# Patient Record
Sex: FEMALE | Race: WHITE | NOT HISPANIC OR LATINO | Employment: OTHER | URBAN - METROPOLITAN AREA
[De-identification: names, ages, dates, MRNs, and addresses within clinical notes are randomized per-mention and may not be internally consistent; named-entity substitution may affect disease eponyms.]

---

## 2017-01-11 ENCOUNTER — OFFICE VISIT (OUTPATIENT)
Dept: SURGERY | Facility: CLINIC | Age: 32
End: 2017-01-11
Payer: COMMERCIAL

## 2017-01-11 ENCOUNTER — HOSPITAL ENCOUNTER (OUTPATIENT)
Dept: LAB | Facility: CLINIC | Age: 32
Discharge: HOME | End: 2017-01-11
Attending: PHYSICIAN ASSISTANT | Admitting: PHYSICIAN ASSISTANT
Payer: COMMERCIAL

## 2017-01-11 VITALS
BODY MASS INDEX: 43.4 KG/M2 | TEMPERATURE: 98.3 F | DIASTOLIC BLOOD PRESSURE: 79 MMHG | WEIGHT: 293 LBS | RESPIRATION RATE: 16 BRPM | HEIGHT: 69 IN | OXYGEN SATURATION: 99 % | SYSTOLIC BLOOD PRESSURE: 125 MMHG | HEART RATE: 79 BPM

## 2017-01-11 DIAGNOSIS — Z13.0 SCREENING FOR ENDOCRINE, NUTRITIONAL, METABOLIC AND IMMUNITY DISORDER: ICD-10-CM

## 2017-01-11 DIAGNOSIS — Z13.29 SCREENING FOR ENDOCRINE, NUTRITIONAL, METABOLIC AND IMMUNITY DISORDER: ICD-10-CM

## 2017-01-11 DIAGNOSIS — Z13.0 SCREENING FOR IRON DEFICIENCY ANEMIA: ICD-10-CM

## 2017-01-11 DIAGNOSIS — Z13.228 SCREENING FOR ENDOCRINE, NUTRITIONAL, METABOLIC AND IMMUNITY DISORDER: ICD-10-CM

## 2017-01-11 DIAGNOSIS — E66.01 OBESITY, CLASS III, BMI 40-49.9 (MORBID OBESITY) (H): Primary | ICD-10-CM

## 2017-01-11 DIAGNOSIS — Z13.21 SCREENING FOR ENDOCRINE, NUTRITIONAL, METABOLIC AND IMMUNITY DISORDER: ICD-10-CM

## 2017-01-11 DIAGNOSIS — L30.4 INTERTRIGO: ICD-10-CM

## 2017-01-11 DIAGNOSIS — M26.609 TMJ DYSFUNCTION: ICD-10-CM

## 2017-01-11 PROBLEM — E66.813 OBESITY, CLASS III, BMI 40-49.9 (MORBID OBESITY) (H): Status: ACTIVE | Noted: 2017-01-11

## 2017-01-11 LAB
ALBUMIN SERPL-MCNC: 3.7 G/DL (ref 3.4–5)
ALP SERPL-CCNC: 55 U/L (ref 40–150)
ALT SERPL W P-5'-P-CCNC: 28 U/L (ref 0–50)
ANION GAP SERPL CALCULATED.3IONS-SCNC: 10 MMOL/L (ref 3–14)
AST SERPL W P-5'-P-CCNC: 27 U/L (ref 0–45)
BILIRUB SERPL-MCNC: 0.3 MG/DL (ref 0.2–1.3)
BUN SERPL-MCNC: 14 MG/DL (ref 7–30)
CALCIUM SERPL-MCNC: 8.8 MG/DL (ref 8.5–10.1)
CHLORIDE SERPL-SCNC: 108 MMOL/L (ref 94–109)
CO2 SERPL-SCNC: 22 MMOL/L (ref 20–32)
CREAT SERPL-MCNC: 0.73 MG/DL (ref 0.52–1.04)
ERYTHROCYTE [DISTWIDTH] IN BLOOD BY AUTOMATED COUNT: 12.3 % (ref 10–15)
ERYTHROCYTE [SEDIMENTATION RATE] IN BLOOD BY WESTERGREN METHOD: 10 MM/H (ref 0–20)
GFR SERPL CREATININE-BSD FRML MDRD: NORMAL ML/MIN/1.7M2
GLUCOSE SERPL-MCNC: 83 MG/DL (ref 70–99)
HBA1C MFR BLD: 6 % (ref 4.3–6)
HCT VFR BLD AUTO: 39.8 % (ref 35–47)
HGB BLD-MCNC: 13.9 G/DL (ref 11.7–15.7)
MCH RBC QN AUTO: 29.2 PG (ref 26.5–33)
MCHC RBC AUTO-ENTMCNC: 34.9 G/DL (ref 31.5–36.5)
MCV RBC AUTO: 84 FL (ref 78–100)
PLATELET # BLD AUTO: 295 10E9/L (ref 150–450)
POTASSIUM SERPL-SCNC: 4 MMOL/L (ref 3.4–5.3)
PROT SERPL-MCNC: 8.1 G/DL (ref 6.8–8.8)
RBC # BLD AUTO: 4.76 10E12/L (ref 3.8–5.2)
SODIUM SERPL-SCNC: 140 MMOL/L (ref 133–144)
T3 SERPL-MCNC: 192 NG/DL (ref 60–181)
T4 FREE SERPL-MCNC: 1.1 NG/DL (ref 0.76–1.46)
TSH SERPL DL<=0.05 MIU/L-ACNC: 1.09 MU/L (ref 0.4–4)
WBC # BLD AUTO: 10.8 10E9/L (ref 4–11)

## 2017-01-11 PROCEDURE — 86141 C-REACTIVE PROTEIN HS: CPT | Performed by: PHYSICIAN ASSISTANT

## 2017-01-11 PROCEDURE — 84439 ASSAY OF FREE THYROXINE: CPT | Performed by: PHYSICIAN ASSISTANT

## 2017-01-11 PROCEDURE — 83036 HEMOGLOBIN GLYCOSYLATED A1C: CPT | Performed by: PHYSICIAN ASSISTANT

## 2017-01-11 PROCEDURE — 84480 ASSAY TRIIODOTHYRONINE (T3): CPT | Performed by: PHYSICIAN ASSISTANT

## 2017-01-11 PROCEDURE — 84443 ASSAY THYROID STIM HORMONE: CPT | Performed by: PHYSICIAN ASSISTANT

## 2017-01-11 PROCEDURE — 85652 RBC SED RATE AUTOMATED: CPT | Performed by: PHYSICIAN ASSISTANT

## 2017-01-11 PROCEDURE — 97802 MEDICAL NUTRITION INDIV IN: CPT | Performed by: DIETITIAN, REGISTERED

## 2017-01-11 PROCEDURE — 99205 OFFICE O/P NEW HI 60 MIN: CPT | Performed by: PHYSICIAN ASSISTANT

## 2017-01-11 PROCEDURE — 82306 VITAMIN D 25 HYDROXY: CPT | Performed by: PHYSICIAN ASSISTANT

## 2017-01-11 PROCEDURE — 85027 COMPLETE CBC AUTOMATED: CPT | Performed by: PHYSICIAN ASSISTANT

## 2017-01-11 PROCEDURE — 80053 COMPREHEN METABOLIC PANEL: CPT | Performed by: PHYSICIAN ASSISTANT

## 2017-01-11 PROCEDURE — 36415 COLL VENOUS BLD VENIPUNCTURE: CPT | Performed by: PHYSICIAN ASSISTANT

## 2017-01-11 NOTE — PROGRESS NOTES
[facility]  [address]  [place] [address]  Phone [phone] Fax [phone]  Date: 2017  RE: CANDI HERNANDEZ  MR#: [id number]  : 1985  Dear Guerrero Benavides MD OB/GYN  I had the pleasure of seeing your patient, CANDI HERNANDEZ, to evaluate her obesity and consider her for possible weight loss surgery. As you know, CANDI is 31 years old. She has been overweight since early childhood. She has been morbidly obese for the last 10 years and has been unable to achieve long-term success with weight loss attempts, such as: Lo Carbohydrate, Diet, or the 6 week body make over.   Comorbidities of obesity from her past medical history include: Asthma - (Not been on meds for years), prediabetes, Low back pain, Herniated disk - (takes occasional ibuprofen for), Skin fold rashes.  The symptoms related to her obesity include heartburn -(occasional tums), Diarrhea, Reflux, Stress Incontinence, Knee pain, Back pain, Ankle or foot pain, Hip pain, Excessively sleep during the day, Loud snoring, Awaken from sleep to catch breath, Morning headache, Shortness of Breath with activity, Leg swelling, intertrigo. The following ADLs are hindered due to her weight: Lower body dressing, Not enough energy to complete routine daily tasks, Shortness of breath with little activity, Toileting.  Non-Obesity Related Past Medical History:  Irritable bowel disorder - controlled by diet  TMJ disfunction - 2 years ago very bad case that she took ibuprofen for to relieve. Still has some bouts but intentionally avoids foods that need to be chewed very well  Anxiety - not treated for but deals with on a weekly basis. Does not interfere with daily living but can feel in her stomach. It was recommended at some point she should go on meds but never has. No history of depression  Acne  Binge Eating - Has 3 small children at home. Does not eat enough for breakfast or lunch and has a very large dinner  Past Surgical  History:  Breast Reduction, [place] teeth removal  Family History:  Father- Diabetes;High blood pressure;High cholesterol;Obese  Mother- Diabetes;High cholesterol;Obese  PGF- Cancer;High cholesterol: Cancer type: Colon Cancer  PGM- Diabetes;High cholesterol;Obese  MGF- Cancer;High cholesterol;Obese: Cancer type: Bladder Cancer  MGM- Diabetes;Obese  Sibling 1- Diabetes;Obese - History of gastric bypass 1.5 years ago  Social History:  She is . 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  Her youngest is 6 months.  Not currently breastfeeding.  Her and her  are pastors at a [employer]  Ethnicity: /white  ETOH: Never  Illicit Drug Use: None  Tobacco Use: No - never  Support system:  and mom will be available to help the patient in the early post op period. , mom, sister, grandmother, aunts is who the patient can count on for support throughout her weight loss journey.  Can you afford three meals per day? Yes  ?Can you afford the $50-60 per month for vitamins? Yes  A 14 point review of system shows:  Pulmonary: Shortness of Breath with activity,  Gastrointestinal: Reflux / heartburn, Diarrhea,  Genitourinary: Stress Incontinence,  HEENT: Headaches - wakes up with HA most days  Musculoskeletal: Knee pain, Back pain, Ankle or foot pain, Hip pain,  Psychological: Anxiety,  Pulmonary: Excessively sleep during the day, Snoring, Awaken from sleep to catch breath, Morning headaches,  Skin: Leg swelling, intertrigo    Vital Signs: Ht: 68.75 in, Wt: 308.8 lbs., BMI: 45.93, BP: 125 / 79 , Pulse: 79, RR: 16, Temp: 98.3     PHYSICAL EXAMINATION:  GENERAL: Alert and oriented x3. NAD  HEENT: Normocephalic, atraumatic, EOMI, Oral dentition adequate for chewing  CARDIOVASCULAR: Regular rate and rhythm, No murmurs, rubs or gallops  RESPIRATORY: lungs CTA bilat.   GASTROINTESTINAL: Soft, Obese, Nontender  LOWER EXTREMITIES: No edema  MUSCULOSKELETAL: Examination gait was normal  NEUROLOGIC: Alert and oriented x 3,  no gross defect  SKIN: dry, warm to touch  In summary, CANDI is morbidly obese with a body mass index of 45.93 kg/m2 and the comorbidities stated above. She attended an informational seminar and is a candidate for Laparoscopic Taz-en-Y Gastric Bypass. She will have to complete the following pre-requisites:  Received weight loss goal of 5 lb prior to surgery.  See dentist about TMJ  Establish with a primary care AND discuss anxiety -   Achieve clearance from dietitian to see surgeon.  Get Sleep study. - already ordered by OB  Have preoperative laboratory tests drawn.  Psychological Evaluation with MMPI and clearance for weight loss surgery.     Today in the office we discussed gastric bypass surgery. Preoperative, perioperative, and postoperative processes, management, and follow up were addressed. Risks and benefits were outlined including the risk of death, PE, DVT, ulcer, N/V, stricture, hernia, wound infection, and vitamin deficiencies. All questions were answered. A goal sheet and support group handout were given to the patient.  Once the patient has completed the requirements set forth in paragraph one, and there are no further recommendations, she will be allowed to see the surgeon of her choice for consultation on the Laparoscopic Taz-en-Y Gastric Bypass surgery. Patient verbalizes understanding of the process to surgery and expectations for the postoperative period including the need for lifelong lifestyle changes, vitamin supplementation, and laboratory monitoring.  Thank you for allowing us to participate in her care.  Sincerely,    Abigail Salinas MS, PARaynaC  At [facility] we are committed to providing you exceptional care. Our surgeons specialize in performing the following minimally invasive weight-loss surgeries:  Taz-en-Y gastric bypass  Laparoscopic adjustable gastric band  Sleeve gastrectomy    If you would like to review aspects of our weight loss surgery program, please visit our website at  [email or web address]. If you have any questions, please do not hesitate to contact us at [phone].

## 2017-01-11 NOTE — MR AVS SNAPSHOT
After Visit Summary   1/11/2017    Shobha Rader    MRN: [id number]           Patient Information     Date Of Birth          1985        Visit Information        Provider Department      1/11/2017 1:00 PM Abigail Salinas PA-C [facility]      Today's Diagnoses     Obesity, Class III, BMI 40-49.9 (morbid obesity) (H)    -  1     Screening for iron deficiency anemia         Screening for endocrine, nutritional, metabolic and immunity disorder         TMJ dysfunction         Intertrigo           Care Instructions    PLAN    1.  Start working on task list items  2.  Schedule with diet next month        Follow-ups after your visit        Additional Services     NUTRITION REFERRAL       Type of nutrition instruction needed: Weight Loss  Your provider has referred you to:     [facility]                  Follow-up notes from your care team     Return in 1 month (on 2/11/2017).      Your next 10 appointments already scheduled     Jan 11, 2017  2:00 PM   Evaluation with Flory Meek Diet 1, RD   [facility] ([facility])    [address] [place] [id number] [phone]              Future tests that were ordered for you today     Open Future Orders        Priority Expected Expires Ordered    CBC with platelets Routine  7/10/2017 1/11/2017    Comprehensive metabolic panel Routine  7/10/2017 1/11/2017    Hemoglobin A1c Routine  7/10/2017 1/11/2017    Vitamin D Deficiency Routine  7/10/2017 1/11/2017            Who to contact     If you have questions or need follow up information about today's clinic visit or your schedule please contact [facility] directly at [phone].  Normal or non-critical lab and imaging results will be communicated to you by MyChart, letter or phone within 4  "business days after the clinic has received the results. If you do not hear from us within 7 days, please contact the clinic through Slyce or phone. If you have a critical or abnormal lab result, we will notify you by phone as soon as possible.  Submit refill requests through Slyce or call your pharmacy and they will forward the refill request to us. Please allow 3 business days for your refill to be completed.          Additional Information About Your Visit        Slyce Information     Slyce lets you send messages to your doctor, view your test results, renew your prescriptions, schedule appointments and more. To sign up, go to [email or web address] . Click on \"Log in\" on the left side of the screen, which will take you to the Welcome page. Then click on \"Sign up Now\" on the right side of the page.     You will be asked to enter the access code listed below, as well as some personal information. Please follow the directions to create your username and password.     Your access code is: [email or web address]  Expires: 2017  1:48 PM     Your access code will  in 90 days. If you need help or a new code, please call your [facility] clinic or [phone].        Care EveryWhere ID     This is your Care EveryWhere ID. This could be used by other organizations to access your [facility] medical records  [id number]        Your Vitals Were     Pulse Temperature Respirations Height BMI (Body Mass Index) Pulse Oximetry    79 98.3  F (36.8  C) 16 5' 8.75\" (1.746 m) 45.95 kg/m2 99%       Blood Pressure from Last 3 Encounters:   17 125/79   16 125/63   14 134/79    Weight from Last 3 Encounters:   17 308 lb 12.8 oz (140.071 kg)   16 320 lb (145.151 kg)   14 312 lb (141.522 kg)              We Performed the Following     NUTRITION REFERRAL     OP ROOMING NOTE TO HUNTER     OP ROOMING NOTE TO HUNTER          Today's Medication Changes          These changes are accurate as of: 17  " 1:49 PM.  If you have any questions, ask your nurse or doctor.               Stop taking these medicines if you haven't already. Please contact your care team if you have questions.     docusate sodium 100 MG tablet   Commonly known as:  COLACE   Stopped by:  Abigail Salinas PA-C           polyethylene glycol Packet   Commonly known as:  MIRALAX/GLYCOLAX   Stopped by:  Abigail Salinas PA-C           prenatal multivitamin  plus iron 27-0.8 MG Tabs per tablet   Stopped by:  Abigail Salinas PA-C                    Primary Care Provider Office Phone # Fax #    Guerrero Jeffrey Benavides [phone]       OB GYN [address] [place] [address] [place] [id number]        Thank you!     Thank you for choosing [facility]  for your care. Our goal is always to provide you with excellent care. Hearing back from our patients is one way we can continue to improve our services. Please take a few minutes to complete the written survey that you may receive in the mail after your visit with us. Thank you!             Your Updated Medication List - Protect others around you: Learn how to safely use, store and throw away your medicines at www.disposemymeds.org.          This list is accurate as of: 1/11/17  1:49 PM.  Always use your most recent med list.                   Brand Name Dispense Instructions for use    ibuprofen 400 MG tablet    ADVIL/MOTRIN    120 tablet    Take 1-2 tablets (400-800 mg) by mouth every 6 hours as needed for other (cramping)

## 2017-01-11 NOTE — PROGRESS NOTES
Name: CANDI HERNANDEZ  : 1985  Gender: Female  MRN: [id number]  Age: 31  INITIAL BARIATRIC NUTRITION ASSESSMENT  REASON FOR VISIT:  CANDI HERNANDEZ is a 31 year old Female presents today for initial nutrition visit for bariatric surgery. She was accompanied by her spouse, David.  DIAGNOSIS:  Morbid Obesity.  ANTHROPOMETRICS:  Height: 68.75 inches  Weight: 308.0 lbs  BMI: 45.8 kg/m2  CURRENT SUPPLEMENTS:  none  WEIGHT LOSS ATTEMPTS:  Lo Carbohydrate, Diet, Exercise and diet  Prescription diet medications:  Other  NUTRITION HISTORY:  Meals per day: 3  Snacking: Yes  Breakfast: Skips 5 X per week or 3 hard boiled eggs, 1 yogurt, milk or leftovers from kids-within 2-2.5 hours of waking  Lunch: Skips 4 X per week or leftovers from kids (mac n cheese or chicken nuggets, green beans)-12:00-12:30  Supper: biggest meal of the day-home cooked meals most nights soups or meat loaf, green beans, potatoes or sloppy reza on bun, macaroni salad, French fries or egg bake-6:00  Snacks: crackers or fruit snacks or yogurt or cheese sticks or gold fish or leftovers from dinner-afternoon and dinner   Drinks: water, milk, occasionally pop coffee tea  Emotional eating: Yes  Binge eating: No  Night eating: No  Can you afford three meals per day? Yes  Can you afford the $50-60 per month for vitamins? 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 Really anything I'm in the mood for.  PHYSICAL ACTIVITY:  None  NUTRITION DIAGNOSIS:  Patient with excessive oral food/beverage intake related to  intake of calorically dense food/beverages at meals and/or snacks as evidenced by BMI of 45.8  INTERVENTION:  Nutrition Prescription: Recommend nutrient/ energy modification   Goals:  Start: 1 multivitamin mineral, 2000 International Units Vitamin D, 1200 mg calcium with vitamin D (2-3 separate doses)  Practice alternatives to emotional eating  Eat 3 meals per day (at the table)   Implementation:  Provided written guidelines for Laparoscopic Taz-en-Y Gastric Bypass surgery.  Provided weight loss suggestions.  Explained diet changes that would occur after surgery.  Emphasized importance of diet and lifestyle modifications.  Discussed necessity for chewable multivitamin/ mineral supplements, calcium with vitamin D, vitamin B-12, vitamin D, Iron and vitamin C supplements.  NUTRITION MONITORING AND EVALUATION:  Verbalizes understanding of surgery diet guidelines.  Anticipated compliance: good    FOLLOW UP:  Recommend 2 to 3 follow up visits to assist with lifestyle changes or per insurance requirements.  RD name and number provided.  TIME SPENT WITH PATIENT: 50 minutes  Eran Rodgers RD, LD  [facility]  [phone]

## 2017-01-12 LAB
CRP SERPL HS-MCNC: 11.4 MG/L
DEPRECATED CALCIDIOL+CALCIFEROL SERPL-MC: 23 UG/L (ref 20–75)

## 2017-02-10 ENCOUNTER — OFFICE VISIT (OUTPATIENT)
Dept: SURGERY | Facility: CLINIC | Age: 32
End: 2017-02-10
Payer: COMMERCIAL

## 2017-02-10 DIAGNOSIS — E66.01 OBESITY, CLASS III, BMI 40-49.9 (MORBID OBESITY) (H): Primary | ICD-10-CM

## 2017-02-10 PROCEDURE — 97803 MED NUTRITION INDIV SUBSEQ: CPT | Performed by: DIETITIAN, REGISTERED

## 2017-02-10 NOTE — PROGRESS NOTES
DATE OF VISIT: 2/10/2017  Name: CANDI HERNANDEZ  : 1985  Gender: Female  MRN: [id number]  Age: 31  ASSESSMENT  REASON FOR VISIT:  CANDI HERNANDEZ is a 31 year old Female presents today for a pre-surgical weight loss follow-up appointment.  DIAGNOSIS:  Class III  Morbid Obesity.    ANTHROPOMETRICS:  Height: 68.75 inches  Initial Weight: 308.0 lbs  Weight last visit: 308.0 lbs  Current Weight: 312.0 lbs  BMI: 46.4 kg/m2    NUTRITION HISTORY:  Breakfast: 2 eggs, yogurt (9:00)  Lunch: Left overs from dinner, or sandwich or salad (12:30)  Supper: Chili, lasagna, baked chicken with sides of green beans and mashed potatoes (6:00)  Snacks: Yogurt or cheese stick (3:00)   Drinks: milk and water  Consuming liquid calories: Milk  Eating 3 meals per day: Yes  Eating slower: No  Chewing foods thoroughly: No  Take 30 minutes to consume each meal: Sometimes  Fluids and meals separate by at least 30 minutes: Sometimes  Comments: 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 (baby is 6 months and pt is not breast feeding); patient shared that her sister had weight loss surgery ~ 1 year ago with [facility]; in general pt wants to role model healthy eating to her children; avoids aspartame as it makes her body ache; spouse is a pator   Desired Surgical Procedure: gastric bypass  PHYSICAL ACTIVITY:  None  DIAGNOSIS:  Previous Nutrition Diagnosis: Obesity related to excess energy intake as evidenced by BMI of 45.8 kg/m2-no change  Previous goals:  Start: 1 multivitamin mineral, 2000 IU Vitamin D, 1200 mg calcium with vitamin D (2-3 separate doses)-improving  Practice alternatives to emotional eating-improving  Eat 3 meals per day (at the table)-met   Current Nutrition Diagnosis: Obesity related to excess energy intake as evidenced by BMI of 46.4 kg/m2  IMPLEMENTATION:  Nutrition Prescription: Recommended energy/nutrient modification  Goals:  Change mindset on need to make behavior changes prior to  surgery  Take 15-20 minutes for meals  Avoid drinking liquids with meals  Implementation:  - Discussed progress towards previous goals  - Reinforced importance of making behavior changes in preparation for bariatric surgery.  NUTRITION MONITORING AND EVALUATION:  Anticipated compliance: Fair to good  Patient verbalized good understanding of bariatric diet guidelines.    Follow up: 1 month with RD  # of visits needed: 2  Cleared by RD: No  TIME SPENT WITH PATIENT: 25 minutes  Steffi Lugo, RD, LD  [facility]  [phone] (office phone)

## 2017-02-22 ENCOUNTER — TRANSFERRED RECORDS (OUTPATIENT)
Dept: HEALTH INFORMATION MANAGEMENT | Facility: CLINIC | Age: 32
End: 2017-02-22

## 2017-03-02 ENCOUNTER — TRANSFERRED RECORDS (OUTPATIENT)
Dept: HEALTH INFORMATION MANAGEMENT | Facility: CLINIC | Age: 32
End: 2017-03-02

## 2017-03-10 ENCOUNTER — OFFICE VISIT (OUTPATIENT)
Dept: SURGERY | Facility: CLINIC | Age: 32
End: 2017-03-10
Payer: MEDICAID

## 2017-03-10 DIAGNOSIS — E66.01 OBESITY, CLASS III, BMI 40-49.9 (MORBID OBESITY) (H): ICD-10-CM

## 2017-03-10 PROCEDURE — 97803 MED NUTRITION INDIV SUBSEQ: CPT | Performed by: DIETITIAN, REGISTERED

## 2017-03-10 NOTE — PROGRESS NOTES
DATE OF VISIT: 3/10/2017  Name: CANDI HERNANEDZ  : 1985  Gender: Female  MRN: [id number]  Age: 31  ASSESSMENT  REASON FOR VISIT:  CANDI HERNANDEZ is a 31 year old Female presents today for a pre-surgical weight loss follow-up appointment.  DIAGNOSIS:  Class III  Morbid Obesity.    ANTHROPOMETRICS:  Height: 68.75 inches  Initial Weight: 308.0 lbs  Weight last visit: 312.0 lbs  Current Weight: 306.7 lbs  BMI: 45.6 kg/m2    NUTRITION HISTORY:  Breakfast: 2 eggs, yogurt (9:00)  Lunch: Left overs from dinner, or sandwich or salad; turkey and cheese rollup; salad with chicken, cheese, dressing (Caesar) (12:30)  Supper: Chili, lasagna, baked chicken with sides of green beans and mashed potatoes (6:00)  Snacks: Yogurt or cheese stick (3:00)   Drinks: milk and water  Consuming liquid calories: Milk  Eating 3 meals per day: Yes  Eating slower: Yes  Chewing foods thoroughly: Yes  Take 30 minutes to consume each meal: Sometimes (15 minutes)  Fluids and meals separate by at least 30 minutes: Yes  Comments: 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  3/10/17 Patient brought kids to appointment and patient was distracted. However, patient making behavior changes. Patient was given one of her favorite package of cookies and chose not to eat it.   Desired Surgical Procedure: gastric bypass  PHYSICAL ACTIVITY:  None (trying to move more but no specific exercise regimen)  DIAGNOSIS:  Previous Nutrition Diagnosis: Obesity related to excess energy intake as evidenced by BMI of 46.4 kg/m2-no change  Previous goals:  Change mindset on need to make behavior changes prior to surgery-met  Take 15-20 minutes for meals-improving  Avoid drinking  liquids with meals-improving  Current Nutrition Diagnosis: Obesity related to excess energy intake as evidenced by BMI of 45.6 kg/m2  IMPLEMENTATION:  Nutrition Prescription: Recommended energy/nutrient modification  Goals:  Exercise 2-3 times per week (walk away the pounds)  Continue practicing avoiding liquids with meals  Chew foods to applesauce texture  Implementation:  - Discussed progress towards previous goals  - Reinforced importance of making behavior changes in preparation for bariatric surgery.  NUTRITION MONITORING AND EVALUATION:  Anticipated compliance: Fair to good  Patient verbalized good understanding of bariatric diet guidelines.  Follow up: 1 month with RD   # of visits needed: 1  Cleared by RD: No  TIME SPENT WITH PATIENT: 20 minutes  Steffi Lugo, RD, LD  [facility] Dietitian  [phone] (office phone)

## 2017-03-10 NOTE — MR AVS SNAPSHOT
"                  MRN:[id number]                      After Visit Summary   3/10/2017    Shobha Rader    MRN: [id number]           Visit Information        Provider Department      3/10/2017 10:30 AM 2, Flory Rubio, YUMIKO [facility]      Your next 10 appointments already scheduled     2017 11:00 AM CDT   Weight Loss Visit with Sh Wl Diet 1, RD   [facility] ([facility])    [address] [place] [id number] [phone]              MyChart Information     Integrated [employer] System[employer] lets you send messages to your doctor, view your test results, renew your prescriptions, schedule appointments and more. To sign up, go to [email or web address] . Click on \"Log in\" on the left side of the screen, which will take you to the Welcome page. Then click on \"Sign up Now\" on the right side of the page.     You will be asked to enter the access code listed below, as well as some personal information. Please follow the directions to create your username and password.     Your access code is: [email or web address]  Expires: 2017  1:48 PM     Your access code will  in 90 days. If you need help or a new code, please call your [facility] clinic or [phone].        Care EveryWhere ID     This is your Care EveryWhere ID. This could be used by other organizations to access your [facility] medical records  [id number]        "

## 2017-03-14 ENCOUNTER — TRANSFERRED RECORDS (OUTPATIENT)
Dept: HEALTH INFORMATION MANAGEMENT | Facility: CLINIC | Age: 32
End: 2017-03-14

## 2017-03-16 ENCOUNTER — TRANSFERRED RECORDS (OUTPATIENT)
Dept: HEALTH INFORMATION MANAGEMENT | Facility: CLINIC | Age: 32
End: 2017-03-16

## 2017-03-27 ENCOUNTER — OFFICE VISIT (OUTPATIENT)
Dept: SLEEP MEDICINE | Facility: CLINIC | Age: 32
End: 2017-03-27
Payer: MEDICAID

## 2017-03-27 VITALS
DIASTOLIC BLOOD PRESSURE: 71 MMHG | OXYGEN SATURATION: 97 % | SYSTOLIC BLOOD PRESSURE: 122 MMHG | TEMPERATURE: 97.4 F | WEIGHT: 293 LBS | HEIGHT: 69 IN | HEART RATE: 82 BPM | BODY MASS INDEX: 43.4 KG/M2

## 2017-03-27 DIAGNOSIS — G47.21 DELAYED SLEEP PHASE SYNDROME: ICD-10-CM

## 2017-03-27 DIAGNOSIS — G47.33 OSA (OBSTRUCTIVE SLEEP APNEA): Primary | ICD-10-CM

## 2017-03-27 PROCEDURE — 99204 OFFICE O/P NEW MOD 45 MIN: CPT | Performed by: PHYSICIAN ASSISTANT

## 2017-03-27 RX ORDER — ALBUTEROL SULFATE 90 UG/1
2 AEROSOL, METERED RESPIRATORY (INHALATION) EVERY 6 HOURS PRN
COMMUNITY
Start: 2014-12-03

## 2017-03-27 RX ORDER — DESOGESTREL AND ETHINYL ESTRADIOL 0.15-0.03
1 KIT ORAL DAILY
Status: ON HOLD | COMMUNITY
End: 2017-07-19

## 2017-03-27 RX ORDER — CALCIUM CARBONATE 500(1250)
500 TABLET ORAL 2 TIMES DAILY
Status: ON HOLD | COMMUNITY
End: 2017-07-19

## 2017-03-27 RX ORDER — MULTIPLE VITAMINS W/ MINERALS TAB 9MG-400MCG
1 TAB ORAL DAILY
Status: ON HOLD | COMMUNITY
End: 2017-07-19

## 2017-03-27 NOTE — MR AVS SNAPSHOT
"              After Visit Summary   3/27/2017    Shobha Rader    MRN: [id number]           Patient Information     Date Of Birth          1985        Visit Information        Provider Department      3/27/2017 2:30 PM Goltz, Bennett Ezra, PA-C [facility]        Today's Diagnoses     SAEID (obstructive sleep apnea)    -  1    Delayed sleep phase syndrome          Care Instructions    MY TREATMENT INFORMATION FOR SLEEP APNEA-  Shobha Rader    DOCTOR : Bennett Ezra Goltz, PA-C  SLEEP CENTER : [place]     MY CONTACT NUMBER: [phone]    If I haven't had a sleep study yet, what can I expect?  A personal story from [employer]  https://www.Quora.com/watch?v=AxPLmlRpnCs    Am I having a home sleep study?  Here is a video in case you get home and want to make sure you have done it correctly  https://www.Quora.com/watch?v=SFE3W2iDqm6&feature=youtu.be    Frequently asked questions:  1. What is Obstructive Sleep Apnea (SAEID)? SAEID is the most common type of sleep apnea. Apnea literally means, \"without breath.\" It is characterized by repetitive pauses in breathing, despite continued effort to breathe, and is usually associated with a reduction in blood oxygen saturation. Apneas can last 10 to over 60 seconds. It is caused by narrowing or collapse of the upper airway as muscles relax during sleep. Severity of sleep apnea is determined by frequency of breathing events and their effect on your sleep and oxygen levels determined during sleep testing.   2. What are the consequences of SAEID? Symptoms include: daytime sleepiness- possibly increasing the risk of falling asleep while driving, unrefreshing/restless sleep, snoring, insomnia, waking frequently to urinate, waking with heartburn or reflux, reduced concentration and memory, and morning headaches. Other health consequences may include development of high blood pressure and other cardiovascular disease in persons who are susceptible. Untreated SAEID  can " contribute to heart disease, stroke and diabetes.   3. What are the treatment options? In most situations, sleep apnea is a lifelong disease that must be managed with daily therapy. Medications are not effective for sleep apnea and surgery is generally not performed until other therapies have been tried. Therapy is usually tailored to the individual patient based on many factors including your wishes as well as severity of sleep apnea and severity of obesity. Continuous Positive Airway (CPAP) is the most reliable treatment. An oral device to hold your jaw forward is usually the next most reliable option. Other options include postioning devices (to keep you off your back), weight loss, and surgery including a tongue pacing device. There is more detail about some of these options below.    1. CPAP-  WHAT DOES IT DO AND HOW CAN I LEARN TO WEAR IT?                               BEFORE I START, CAN I WATCH A MOVIE TO GET A PLAN ON HOW TO USE CPAP?  https://www.Takes.com/watch?a=b6H46kp867A    Continuous positive airway pressure, or CPAP, is the most effective treatment for obstructive sleep apnea. It works by blowing room air, through a mask, to hold your throat open. A decision to use CPAP is a major step forward in the pursuit of a healthier life. The successful use of CPAP will help you breathe easier, sleep better and live healthier. You can choose CPAP equipment from any durable medical equipment provider that meets your needs.  Using CPAP can be a positive experience if you keep these cao points in mind:  1. Commitment  CPAP is not a quick fix for your problem. It involves a long-term commitment to improve your sleep and your health.    2. Communication  Stay in close communication with both your sleep doctor and your CPAP supplier. Ask lots of questions and seek help when you need it.    3. Consistency  Use CPAP all night, every night and for every nap. You will receive the maximum health benefits from CPAP  "when you use it every time that you sleep. This will also make it easier for your body to adjust to the treatment.    4. Correction  The first machine and mask that you try may not be the best ones for you. Work with your sleep doctor and your CPAP supplier to make corrections to your equipment selection. Ask about trying a different type of machine or mask if you have ongoing problems. Make sure that your mask is a good fit and learn to use your equipment properly.    5. Challenge  Tell a family member or close friend to ask you each morning if you used your CPAP the previous night. Have someone to challenge you to give it your best effort.    6. Connection   Your adjustment to CPAP will be easier if you are able to connect with others who use the same treatment. Ask your sleep doctor if there is a support group in your area for people who have sleep apnea, or look for one on the Internet.  7. Comfort   Increase your level of comfort by using a saline spray, decongestant or heated humidifier if CPAP irritates your nose, mouth or throat. Use your unit's \"ramp\" setting to slowly get used to the air pressure level. There may be soft pads you can buy that will fit over your mask straps. Look on www.CPAP.com for accessories that can help make CPAP use more comfortable.  8. Cleaning   Clean your mask, tubing and headgear on a regular basis. Put this time in your schedule so that you don't forget to do it. Check and replace the filters for your CPAP unit and humidifier.    9. Completion   Although you are never finished with CPAP therapy, you should reward yourself by celebrating the completion of your first month of treatment. Expect this first month to be your hardest period of adjustment. It will involve some trial and error as you find the machine, mask and pressure settings that are right for you.    10. Continuation  After your first month of treatment, continue to make a daily commitment to use your CPAP all night, " every night and for every nap.    CPAP-Tips to starting with success:  Begin using your CPAP for short periods of time during the day while you watch TV or read.    Use CPAP every night and for every nap. Using it less often reduces the health benefits and makes it harder for your body to get used to it.    Make small adjustments to your mask, tubing, straps and headgear until you get the right fit. Tightening the mask may actually worsen the leak.  If it leaves significant marks on your face or irritates the bridge of your nose, it may not be the best mask for you.  Speak with the person who supplied the mask and consider trying other masks. Insurances will allow you to try different masks during the first month of starting CPAP.  Insurance also covers a new mask, hose and filter about every 6 months.    Use a saline nasal spray to ease mild nasal congestion. Neti-Pot or saline nasal rinses may also help. Nasal gel sprays can help reduce nasal dryness.  Biotene mouthwash can be helpful to protect your teeth if you experience frequent dry mouth.  Dry mouth may be a sign of air escaping out of your mouth or out of the mask in the case of a full face mask.  Speak with your provider if you expect that is the case.     Take a nasal decongestant to relieve more severe nasal or sinus congestion.  Do not use Afrin (oxymetazoline) nasal spray more than 3 days in a row.  Speak with your sleep doctor if your nasal congestion is chronic.    Use a heated humidifier that fits your CPAP model to enhance your breathing comfort. Adjust the heat setting up if you get a dry nose or throat, down if you get condensation in the hose or mask.  Position the CPAP lower than you so that any condensation in the hose drains back into the machine rather than towards the mask.    Try a system that uses nasal pillows if traditional masks give you problems.    Clean your mask, tubing and headgear once a week. Make sure the equipment dries  fully.    Regularly check and replace the filters for your CPAP unit and humidifier.    Work closely with your sleep provider and your CPAP supplier to make sure that you have the machine, mask and air pressure setting that works best for you. It is better to stop using it and call your provider to solve problems than to lay awake all night frustrated with the device.    BESIDES CPAP, WHAT OTHER THERAPIES ARE THERE?  Positioning Device  Positioning devices are generally used when sleep apnea is mild and only occurs on your back.This example shows a pillow that straps around the waist. It may be appropriate for those whose sleep study shows milder sleep apnea that occurs primarily when lying flat on one's back. Preliminary studies have shown benefit but effectiveness at home may need to be verified by a home sleep test. These devices are generally not covered by medical insurance.                      Oral Appliance  What is oral appliance therapy?  An oral appliance is a small acrylic device that fits over the upper and lower teeth or tongue (similar to an orthodontic retainer or a mouth guard). This device slightly advances the lower jaw or tongue, which moves the base of the tongue forward, opens the airway, improves breathing and can effectively treat snoring and obstructive sleep apnea sleep apnea. The appliance is fabricated and customized by a qualified dentist with experience in treating snoring and sleep apnea. Oral appliances are usually well tolerated and have relatively high compliance by patients1, 2, 3.  When is an oral appliance indicated?  Oral appliance therapy is recommended as a first-line treatment for patients with primary snoring, mild sleep apnea, and for patients with moderate sleep apnea who prefer appliance therapy to use of CPAP4, 5. Severity of sleep apnea is determined by sleep testing and is based on the number of respiratory events per hour of sleep.   How successful is oral appliance  therapy?  The success rate of oral appliance therapy in patients with mild sleep apnea is 75-80% while in patients with moderate sleep apnea it is 50-70%. The chance of success in patients with severe sleep apnea is 40-50%. The research also shows that oral appliances have a beneficial effect on the cardiovascular health of SAEID patients at the same magnitude as CPAP therapy7.  Oral appliances should be a second-line treatment in cases of severe sleep apnea, but if not completely successful then a combination therapy utilizing CPAP plus oral appliance therapy may be effective. Oral appliances tend to be effective in a broad range of patients although studies show that the patients who have the highest success are females, younger patients, those with milder disease, and less severe obesity. 3, 6.   The chances of success are lower in patients who have more severe SAEID, are older, and those who are morbidly obese.     Example of an oral appliance   Finding a dentist that practices dental sleep medicine  Specific training is available through the American Academy of Dental Sleep Medicine for dentists interested in working in the field of sleep. To find a dentist who is educated in the field of sleep and the use of oral appliances, near you, visit the Web site of the American Academy of Dental Sleep Medicine; also see   http://www.accpstorage.org/newOrganization/patients/oralAppliances.pdf  To search for a dentist certified in these practices:  Http://aadsm.org/FindADentist.aspx?1  1. Senait, et al. Objectively measured vs self-reported compliance during oral appliance therapy for sleep-disordered breathing. Chest 2013; 144(5): 5772-9497.  2. Pablo et al. Objective measurement of compliance during oral appliance therapy for sleep-disordered breathing. Thorax 2013; 68(1): 91-96.  3. Seema et al. Mandibular advancement devices in 620 men and women with SAEID and snoring: tolerability and predictors of  treatment success. Chest 2004; 125: 7332-7248.  4. Eben et al. Oral appliances for snoring and SAEID: a review. Sleep 2006; 29: 244-262.  5. Andrea et al. Oral appliance treatment for SAEID: an update. J Clin Sleep Med 2014; 10(2): 215-227.  6. Alaina et al. Predictors of OSAH treatment outcome. J Dent Res 2007; 86: 1006-2035.  Weight Loss:    Weight management is a personal decision.  If you are interested in exploring weight loss strategies, the following discussion covers the impact on weight loss on sleep apnea and the approaches that may be successful.    Weight loss decreases severity of sleep apnea in most people with obesity. For those with mild obesity who have developed snoring with weight gain, even 15-30 pound weight loss can improve and occasionally eliminate sleep apnea.  Structured and life-long dietary and health habits are necessary to lose weight and keep healthier weight levels.     Though there may be significant health benefits from weight loss, long-term weight loss is very difficult to achieve- studies show success with dietary management in less than 10% of people. In addition, substantial weight loss may require years of dietary control and may be difficult if patients have severe obesity. In these cases, surgical management may be considered.  Finally, older individuals who have tolerated obesity without health complications may be less likely to benefit from weight loss strategies.    Your BMI is Body mass index is 45.48 kg/(m^2).  Body mass index (BMI) is one way to tell whether you are at a healthy weight, overweight, or obese. It measures your weight in relation to your height.  A BMI of 18.5 to 24.9 is in the healthy range. A person with a BMI of 25 to 29.9 is considered overweight, and someone with a BMI of 30 or greater is considered obese. More than two-thirds of American adults are considered overweight or obese.  Being overweight or obese increases the risk for further  weight gain. Excess weight may lead to heart disease and diabetes.  Creating and following plans for healthy eating and physical activity may help you improve your health.  Weight control is part of healthy lifestyle and includes exercise, emotional health, and healthy eating habits. Careful eating habits lifelong are the mainstay of weight control. Though there are significant health benefits from weight loss, long-term weight loss with diet alone may be very difficult to achieve- studies show long-term success with dietary management in less than 10% of people. Attaining a healthy weight may be especially difficult to achieve in those with severe obesity. In some cases, medications, devices and surgical management might be considered.  What can you do?  If you are overweight or obese and are interested in methods for weight loss, you should discuss this with your provider.     Consider reducing daily calorie intake by 500 calories.     Keep a food journal.     Avoiding skipping meals, consider cutting portions instead.    Diet combined with exercise helps maintain muscle while optimizing fat loss. Strength training is particularly important for building and maintaining muscle mass. Exercise helps reduce stress, increase energy, and improves fitness. Increasing exercise without diet control, however, may not burn enough calories to loose weight.       Start walking three days a week 10-20 minutes at a time    Work towards walking thirty minutes five days a week     Eventually, increase the speed of your walking for 1-2 minutes at time    In addition, we recommend that you review healthy lifestyles and methods for weight loss available through the National Institutes of Health patient information sites:  http://win.niddk.nih.gov/publications/index.htm    And look into health and wellness programs that may be available through your health insurance provider, employer, local community center, or tara club.    Weight  management plan: continue to follow with bariatric center  Surgery:  Upper Airway Surgery for SAEID  Surgery for SAEID is a second-line treatment option in the management of sleep apnea.  Surgery should be considered for patients who are having a difficult time tolerating CPAP.    Surgery for SAEID is directed at areas that are responsible for narrowing or complete obstruction of the airway during sleep.  There are a wide range of procedures available to enlarge and/or stabilize the airway to prevent blockage of breathing in the three major areas where it can occur: the palate, tongue, and nasal regions.  Successful surgical treatment depends on the accurate identification of the factors responsible for obstructive sleep apnea in each person.  A personalized approach is required because there is no single treatment that works well for everyone.  Because of anatomic variation, consultation with an examination by a sleep surgeon is a critical first step in determining what surgical options are best for each patient.  In some cases, examination during sedation may be recommended in order to guide the selection of procedures.  Patients will be counseled about risks and benefits as well as the typical recovery course after surgery. Surgery is typically not a cure for a person s SAEID.  However, surgery will often significantly improve one s SAEID severity (termed  success rate ).  Even in the absence of a cure, surgery will decrease the cardiovascular risk associated with OSA7; improve overall quality of life8 (sleepiness, functionality, sleep quality, etc).      Palate Procedures:  Patients with SAEID often have narrowing of their airway in the region of their tonsils and uvula.  The goals of palate procedures are to widen the airway in this region as well as to help the tissues resist collapse.  Modern palate procedure techniques focus on tissue conservation and soft tissue rearrangement, rather than tissue removal.  Often the  uvula is preserved in this procedure. Residual sleep apnea is common in patient after pharyngoplasty with an average reduction in sleep apnea events of 33%2.      Tongue Procedures:  While patients are awake, the muscles that surround the throat are active and keep this region open for breathing. These muscles relax during sleep, allowing the tongue and other structures to collapse and block breathing.  There are several different tongue procedures available.  Selection of a tongue base procedure depends on characteristics seen on physical exam.  Generally, procedures are aimed at removing bulky tissues in this area or preventing the back of the tongue from falling back during sleep.  Success rates for tongue surgery range from 50-62%3.    Hypoglossal Nerve Stimulation:  Hypoglossal nerve stimulation has recently received approval from the [place] Food and Drug Administration for the treatment of obstructive sleep apnea.  This is based on research showing that the system was safe and effective in treating sleep apnea6.  Results showed that the median AHI score decreased 68%, from 29.3 to 9.0. This therapy uses an implant system that senses breathing patterns and delivers mild stimulation to airway muscles, which keeps the airway open during sleep.  The system consists of three fully implanted components: a small generator (similar in size to a pacemaker), a breathing sensor, and a stimulation lead.  Using a small handheld remote, a patient turns the therapy on before bed and off upon awakening.    Candidates for this device must be greater than 22 years of age, have moderate to severe SAEID (AHI between 20-65), BMI less than 32, have tried CPAP/oral appliance without success, and have appropriate upper airway anatomy (determined by a sleep endoscopy performed by Dr. Rowland).    Hypoglossal Nerve Stimulation Pathway:    The sleep surgeon s office will work with the patient through the insurance prior-authorization  process (including communications and appeals).    Nasal Procedures:  Nasal obstruction can interfere with nasal breathing during the day and night.  Studies have shown that relief of nasal obstruction can improve the ability of some patients to tolerate positive airway pressure therapy for obstructive sleep apnea1.  Treatment options include medications such as nasal saline, topical corticosteroid and antihistamine sprays, and oral medications such as antihistamines or decongestants. Non-surgical treatments can include external nasal dilators for selected patients. If these are not successful by themselves, surgery can improve the nasal airway either alone or in combination with these other options.  Combination Procedures:  Combination of surgical procedures and other treatments may be recommended, particularly if patients have more than one area of narrowing or persistent positional disease.  The success rate of combination surgery ranges from 66-80%2,3.    1. Key VILLANUEVA. The Role of the Nose in Snoring and Obstructive Sleep Apnoea: An Update.  Eur Arch Otorhinolaryngol. 2011; 268: 1365-73.  2.  Shanna SM; Armen JA; Jose Luis JR; Pallanch JF; Misti MB; Pio SG; Aiyana ABEBE. Surgical modifications of the upper airway for obstructive sleep apnea in adults: a systematic review and meta-analysis. SLEEP 2010;33(10):7351-0680. Timothy ELIZONDO. Hypopharyngeal surgery in obstructive sleep apnea: an evidence-based medicine review.  Arch Otolaryngol Head Neck Surg. 2006 Feb;132(2):206-13.  3. Prabhjot YH1, Darline Y, Romulo TISHA. The efficacy of anatomically based multilevel surgery for obstructive sleep apnea. Otolaryngol Head Neck Surg. 2003 Oct;129(4):327-35.  4. Kezirian E, Goldberg A. Hypopharyngeal Surgery in Obstructive Sleep Apnea: An Evidence-Based Medicine Review. Arch Otolaryngol Head Neck Surg. 2006 Feb;132(2):206-13.  5. Hodan VERMA et al. Upper-Airway Stimulation for Obstructive Sleep Apnea.  N Engl J Med. 2014 Ramiro  9;370(2):139-49.  6. Diana Y et al. Increased Incidence of Cardiovascular Disease in Middle-aged Men with Obstructive Sleep Apnea. Am J Respir Crit Care Med; 2002 166: 159-165  7. Nivia TENORIO et al. Studying Life Effects and Effectiveness of Palatopharyngoplasty (SLEEP) study: Subjective Outcomes of Isolated Uvulopalatopharyngoplasty. Otolaryngol Head Neck Surg. 2011; 144: 623-631.      Instructions for treating Delayed Sleep Phase Syndrome:    Delayed Sleep Phase Syndrome (DSPS) means that your body's internal timing is set late compared to the 24 hour day. Therefore, it is often difficult to get up on time for work in the morning and sometimes difficult to fall asleep on time, in order to get enough sleep. People with DSPS often tend to like to stay up late on weekends and sleep in until between 10 AM and noon, sometimes even later.This is actually a bad habit that will perpetuate the problem. It reinforces your body's tendency to be on that later schedule.    You should go to bed when you are sleepy and ready to sleep. During this entire process, you should not engage in activities that may make it worse, such as watching TV in bed, leaving the TV on all night, drinking any caffeine 6 hours before bed or exercising 1-2 hours before bed.     Start taking Melatonin, 1 mg tablet 3-5 hours before the time that you fall asleep on average (not your desired bedtime or time that you get in bed, but the time you normally fall asleep on your own).     Upon awakening, get exposure to sun-light for about 30-45 minutes. You do not need to look at the sun, in fact, this is dangerous. Reading the paper with the sun shining on you is adequate.  Alternatively, you may use a Seasonal Affective Disorder Lamp (intensity 10,000 Lux) instead of the sun. The lamp should be positioned 1-2 arms lengths away from you. They lamps are sold at [employer] such as [employer] or [employer]. A prescription can be written  to get insurance coverage in some cases. They are also sold on [employer].com.    Using the light and melatonin should help march your internal clock (known as your circadian rhythms) gradually earlier. As your bedtime advances, remember to take your melatonin earlier, keeping it 5 hours before your fall asleep time.    Avoid naps and sleeping in because sleeping during the day will delay your body's clock and you will have to start from scratch.     More information about light therapy:  If the cost of any light box is too much, you can also purchase a compact fluorescent all spectrum light bulb at a local hardware store for about $8.  The most commonly available bulb is 1400 lumen.  You would need two of these positioned within 1 meter of yourself to be equivalent to 2,500 lux.  The bulbs can be placed in a standard light fixture.  Additionally, they can be placed in a mountable fixture that is used in greenhouses.  Mountable fixtures are also available at hardware stores for about $9.  Do not look directly at the light.  If you have any concerns regarding the safety of bright light therapy for you, it is recommended that you consult an ophthalmologist before using a light box.  If you have a condition that makes your eyes very sensitive to light, macular degeneration, a family history of such problems, or diabetic changes to your eyes, consult an ophthalmologist before using a light box. If you have anxiety disorder and have an increase in anxiety discontinue use.                    Follow-ups after your visit        Your next 10 appointments already scheduled     Apr 11, 2017 11:00 AM CDT   Weight Loss Visit with Flory Meek Diet 1, RD   [facility] ([facility])    [address] [place] [id number] [phone]              Who to contact     If you have questions or need follow up information about today's clinic visit or your schedule  "please contact [facility] directly at [phone].  Normal or non-critical lab and imaging results will be communicated to you by MyChart, letter or phone within 4 business days after the clinic has received the results. If you do not hear from us within 7 days, please contact the clinic through MyChart or phone. If you have a critical or abnormal lab result, we will notify you by phone as soon as possible.  Submit refill requests through [employer] or call your pharmacy and they will forward the refill request to us. Please allow 3 business days for your refill to be completed.          Additional Information About Your Visit        "[employer] Information     [employer] lets you send messages to your doctor, view your test results, renew your prescriptions, schedule appointments and more. To sign up, go to [email or web address] . Click on \"Log in\" on the left side of the screen, which will take you to the Welcome page. Then click on \"Sign up Now\" on the right side of the page.     You will be asked to enter the access code listed below, as well as some personal information. Please follow the directions to create your username and password.     Your access code is: [email or web address]  Expires: 2017  2:48 PM     Your access code will  in 90 days. If you need help or a new code, please call your [place] [facility] clinic or [phone].        Care EveryWhere ID     This is your Care EveryWhere ID. This could be used by other organizations to access your [facility] [facility] medical records  [id number]        Your Vitals Were     Pulse Temperature Height Pulse Oximetry BMI (Body Mass Index)       82 97.4  F (36.3  C) (Oral) 1.753 m (5' 9\") 97% 45.48 kg/m2        Blood Pressure from Last 3 Encounters:   17 122/71   17 125/79   16 125/63    Weight from Last 3 Encounters:   17 (!) 139.7 kg (308 lb)   17 (!) 140.1 kg (308 lb 12.8 oz)   16 (!) 145.2 kg (320 lb)              Today, you " had the following     No orders found for display         Today's Medication Changes          These changes are accurate as of: 3/27/17  3:42 PM.  If you have any questions, ask your nurse or doctor.               Stop taking these medicines if you haven't already. Please contact your care team if you have questions.     ibuprofen 400 MG tablet   Commonly known as:  ADVIL/MOTRIN   Stopped by:  Goltz, Bennett Ezra, PA-C                    Primary Care Provider Office Phone # Fax #    Guerrero Benavides [phone]       OB GYN [address]  [facility] [address]        Thank you!     Thank you for choosing [facility]  for your care. Our goal is always to provide you with excellent care. Hearing back from our patients is one way we can continue to improve our services. Please take a few minutes to complete the written survey that you may receive in the mail after your visit with us. Thank you!             Your Updated Medication List - Protect others around you: Learn how to safely use, store and throw away your medicines at www.disposemymeds.org.          This list is accurate as of: 3/27/17  3:42 PM.  Always use your most recent med list.                   Brand Name Dispense Instructions for use    albuterol 108 (90 BASE) MCG/ACT Inhaler    PROAIR HFA/PROVENTIL HFA/VENTOLIN HFA     Inhale 2 puffs into the lungs as needed       calcium carbonate 500 MG tablet    OS-ROSA 500 mg [place]. Ca     Take 500 mg by mouth 2 times daily       desogestrel-ethinyl estradiol 0.15-30 MG-MCG per tablet    APRI     Take 1 tablet by mouth daily       Multi-vitamin Tabs tablet      Take 1 tablet by mouth daily       TYLENOL PO          VITAMIN D (CHOLECALCIFEROL) PO      Take by mouth daily

## 2017-03-27 NOTE — PATIENT INSTRUCTIONS
"MY TREATMENT INFORMATION FOR SLEEP APNEA-  Shobha Rader    DOCTOR : Bennett Ezra Goltz, PA-C  SLEEP C[facility] : [facility]     MY CONTACT NUMBER: [phone]    If I haven't had a sleep study yet, what can I expect?  A personal story from [place]  https://www.Shopalyticube.com/watch?v=AxPLmlRpnCs    Am I having a home sleep study?  Here is a video in case you get home and want to make sure you have done it correctly  https://www.Shopalyticube.com/watch?v=VRY3H5mOhh0&feature=youtu.be    Frequently asked questions:  1. What is Obstructive Sleep Apnea (SAEID)? SAEID is the most common type of sleep apnea. Apnea literally means, \"without breath.\" It is characterized by repetitive pauses in breathing, despite continued effort to breathe, and is usually associated with a reduction in blood oxygen saturation. Apneas can last 10 to over 60 seconds. It is caused by narrowing or collapse of the upper airway as muscles relax during sleep. Severity of sleep apnea is determined by frequency of breathing events and their effect on your sleep and oxygen levels determined during sleep testing.   2. What are the consequences of SAEID? Symptoms include: daytime sleepiness- possibly increasing the risk of falling asleep while driving, unrefreshing/restless sleep, snoring, insomnia, waking frequently to urinate, waking with heartburn or reflux, reduced concentration and memory, and morning headaches. Other health consequences may include development of high blood pressure and other cardiovascular disease in persons who are susceptible. Untreated SAEID  can contribute to heart disease, stroke and diabetes.   3. What are the treatment options? In most situations, sleep apnea is a lifelong disease that must be managed with daily therapy. Medications are not effective for sleep apnea and surgery is generally not performed until other therapies have been tried. Therapy is usually tailored to the individual patient based on many factors including your wishes as well " as severity of sleep apnea and severity of obesity. Continuous Positive Airway (CPAP) is the most reliable treatment. An oral device to hold your jaw forward is usually the next most reliable option. Other options include postioning devices (to keep you off your back), weight loss, and surgery including a tongue pacing device. There is more detail about some of these options below.    1. CPAP-  WHAT DOES IT DO AND HOW CAN I LEARN TO WEAR IT?                               BEFORE I START, CAN I WATCH A MOVIE TO GET A PLAN ON HOW TO USE CPAP?  https://www.Wanshen.com/watch?c=k5I68tb082H    Continuous positive airway pressure, or CPAP, is the most effective treatment for obstructive sleep apnea. It works by blowing room air, through a mask, to hold your throat open. A decision to use CPAP is a major step forward in the pursuit of a healthier life. The successful use of CPAP will help you breathe easier, sleep better and live healthier. You can choose CPAP equipment from any durable medical equipment provider that meets your needs.  Using CPAP can be a positive experience if you keep these cao points in mind:  1. Commitment  CPAP is not a quick fix for your problem. It involves a long-term commitment to improve your sleep and your health.    2. Communication  Stay in close communication with both your sleep doctor and your CPAP supplier. Ask lots of questions and seek help when you need it.    3. Consistency  Use CPAP all night, every night and for every nap. You will receive the maximum health benefits from CPAP when you use it every time that you sleep. This will also make it easier for your body to adjust to the treatment.    4. Correction  The first machine and mask that you try may not be the best ones for you. Work with your sleep doctor and your CPAP supplier to make corrections to your equipment selection. Ask about trying a different type of machine or mask if you have ongoing problems. Make sure that your mask  "is a good fit and learn to use your equipment properly.    5. Challenge  Tell a family member or close friend to ask you each morning if you used your CPAP the previous night. Have someone to challenge you to give it your best effort.    6. Connection   Your adjustment to CPAP will be easier if you are able to connect with others who use the same treatment. Ask your sleep doctor if there is a support group in your area for people who have sleep apnea, or look for one on the Internet.  7. Comfort   Increase your level of comfort by using a saline spray, decongestant or heated humidifier if CPAP irritates your nose, mouth or throat. Use your unit's \"ramp\" setting to slowly get used to the air pressure level. There may be soft pads you can buy that will fit over your mask straps. Look on www.CPAP.com for accessories that can help make CPAP use more comfortable.  8. Cleaning   Clean your mask, tubing and headgear on a regular basis. Put this time in your schedule so that you don't forget to do it. Check and replace the filters for your CPAP unit and humidifier.    9. Completion   Although you are never finished with CPAP therapy, you should reward yourself by celebrating the completion of your first month of treatment. Expect this first month to be your hardest period of adjustment. It will involve some trial and error as you find the machine, mask and pressure settings that are right for you.    10. Continuation  After your first month of treatment, continue to make a daily commitment to use your CPAP all night, every night and for every nap.    CPAP-Tips to starting with success:  Begin using your CPAP for short periods of time during the day while you watch TV or read.    Use CPAP every night and for every nap. Using it less often reduces the health benefits and makes it harder for your body to get used to it.    Make small adjustments to your mask, tubing, straps and headgear until you get the right fit. Tightening " the mask may actually worsen the leak.  If it leaves significant marks on your face or irritates the bridge of your nose, it may not be the best mask for you.  Speak with the person who supplied the mask and consider trying other masks. Insurances will allow you to try different masks during the first month of starting CPAP.  Insurance also covers a new mask, hose and filter about every 6 months.    Use a saline nasal spray to ease mild nasal congestion. Neti-Pot or saline nasal rinses may also help. Nasal gel sprays can help reduce nasal dryness.  Biotene mouthwash can be helpful to protect your teeth if you experience frequent dry mouth.  Dry mouth may be a sign of air escaping out of your mouth or out of the mask in the case of a full face mask.  Speak with your provider if you expect that is the case.     Take a nasal decongestant to relieve more severe nasal or sinus congestion.  Do not use Afrin (oxymetazoline) nasal spray more than 3 days in a row.  Speak with your sleep doctor if your nasal congestion is chronic.    Use a heated humidifier that fits your CPAP model to enhance your breathing comfort. Adjust the heat setting up if you get a dry nose or throat, down if you get condensation in the hose or mask.  Position the CPAP lower than you so that any condensation in the hose drains back into the machine rather than towards the mask.    Try a system that uses nasal pillows if traditional masks give you problems.    Clean your mask, tubing and headgear once a week. Make sure the equipment dries fully.    Regularly check and replace the filters for your CPAP unit and humidifier.    Work closely with your sleep provider and your CPAP supplier to make sure that you have the machine, mask and air pressure setting that works best for you. It is better to stop using it and call your provider to solve problems than to lay awake all night frustrated with the device.    BESIDES CPAP, WHAT OTHER THERAPIES ARE  THERE?  Positioning Device  Positioning devices are generally used when sleep apnea is mild and only occurs on your back.This example shows a pillow that straps around the waist. It may be appropriate for those whose sleep study shows milder sleep apnea that occurs primarily when lying flat on one's back. Preliminary studies have shown benefit but effectiveness at home may need to be verified by a home sleep test. These devices are generally not covered by medical insurance.                      Oral Appliance  What is oral appliance therapy?  An oral appliance is a small acrylic device that fits over the upper and lower teeth or tongue (similar to an orthodontic retainer or a mouth guard). This device slightly advances the lower jaw or tongue, which moves the base of the tongue forward, opens the airway, improves breathing and can effectively treat snoring and obstructive sleep apnea sleep apnea. The appliance is fabricated and customized by a qualified dentist with experience in treating snoring and sleep apnea. Oral appliances are usually well tolerated and have relatively high compliance by patients1, 2, 3.  When is an oral appliance indicated?  Oral appliance therapy is recommended as a first-line treatment for patients with primary snoring, mild sleep apnea, and for patients with moderate sleep apnea who prefer appliance therapy to use of CPAP4, 5. Severity of sleep apnea is determined by sleep testing and is based on the number of respiratory events per hour of sleep.   How successful is oral appliance therapy?  The success rate of oral appliance therapy in patients with mild sleep apnea is 75-80% while in patients with moderate sleep apnea it is 50-70%. The chance of success in patients with severe sleep apnea is 40-50%. The research also shows that oral appliances have a beneficial effect on the cardiovascular health of SAEID patients at the same magnitude as CPAP therapy7.  Oral appliances should be a  second-line treatment in cases of severe sleep apnea, but if not completely successful then a combination therapy utilizing CPAP plus oral appliance therapy may be effective. Oral appliances tend to be effective in a broad range of patients although studies show that the patients who have the highest success are females, younger patients, those with milder disease, and less severe obesity. 3, 6.   The chances of success are lower in patients who have more severe SAEID, are older, and those who are morbidly obese.     Example of an oral appliance   Finding a dentist that practices dental sleep medicine  Specific training is available through the American Academy of Dental Sleep Medicine for dentists interested in working in the field of sleep. To find a dentist who is educated in the field of sleep and the use of oral appliances, near you, visit the Web site of the American Academy of Dental Sleep Medicine; also see   http://www.accpstorage.org/newOrganization/patients/oralAppliances.pdf  To search for a dentist certified in these practices:  Http://aadsm.org/FindADentist.aspx?1  1. Senait et al. Objectively measured vs self-reported compliance during oral appliance therapy for sleep-disordered breathing. Chest 2013; 144(5): 5372-1427.  2. Pablo, et al. Objective measurement of compliance during oral appliance therapy for sleep-disordered breathing. Thorax 2013; 68(1): 91-96.  3. Seema, et al. Mandibular advancement devices in 620 men and women with SAEID and snoring: tolerability and predictors of treatment success. Chest 2004; 125: 8744-1653.  4. Eben, et al. Oral appliances for snoring and SAEID: a review. Sleep 2006; 29: 244-262.  5. Andrea et al. Oral appliance treatment for SAEID: an update. J Clin Sleep Med 2014; 10(2): 215-227.  6. Alaina et al. Predictors of OSAH treatment outcome. J Dent Res 2007; 86: 7795-9554.  Weight Loss:    Weight management is a personal decision.  If you are  interested in exploring weight loss strategies, the following discussion covers the impact on weight loss on sleep apnea and the approaches that may be successful.    Weight loss decreases severity of sleep apnea in most people with obesity. For those with mild obesity who have developed snoring with weight gain, even 15-30 pound weight loss can improve and occasionally eliminate sleep apnea.  Structured and life-long dietary and health habits are necessary to lose weight and keep healthier weight levels.     Though there may be significant health benefits from weight loss, long-term weight loss is very difficult to achieve- studies show success with dietary management in less than 10% of people. In addition, substantial weight loss may require years of dietary control and may be difficult if patients have severe obesity. In these cases, surgical management may be considered.  Finally, older individuals who have tolerated obesity without health complications may be less likely to benefit from weight loss strategies.    Your BMI is Body mass index is 45.48 kg/(m^2).  Body mass index (BMI) is one way to tell whether you are at a healthy weight, overweight, or obese. It measures your weight in relation to your height.  A BMI of 18.5 to 24.9 is in the healthy range. A person with a BMI of 25 to 29.9 is considered overweight, and someone with a BMI of 30 or greater is considered obese. More than two-thirds of American adults are considered overweight or obese.  Being overweight or obese increases the risk for further weight gain. Excess weight may lead to heart disease and diabetes.  Creating and following plans for healthy eating and physical activity may help you improve your health.  Weight control is part of healthy lifestyle and includes exercise, emotional health, and healthy eating habits. Careful eating habits lifelong are the mainstay of weight control. Though there are significant health benefits from weight  loss, long-term weight loss with diet alone may be very difficult to achieve- studies show long-term success with dietary management in less than 10% of people. Attaining a healthy weight may be especially difficult to achieve in those with severe obesity. In some cases, medications, devices and surgical management might be considered.  What can you do?  If you are overweight or obese and are interested in methods for weight loss, you should discuss this with your provider.     Consider reducing daily calorie intake by 500 calories.     Keep a food journal.     Avoiding skipping meals, consider cutting portions instead.    Diet combined with exercise helps maintain muscle while optimizing fat loss. Strength training is particularly important for building and maintaining muscle mass. Exercise helps reduce stress, increase energy, and improves fitness. Increasing exercise without diet control, however, may not burn enough calories to loose weight.       Start walking three days a week 10-20 minutes at a time    Work towards walking thirty minutes five days a week     Eventually, increase the speed of your walking for 1-2 minutes at time    In addition, we recommend that you review healthy lifestyles and methods for weight loss available through the National Institutes of Health patient information sites:  http://win.niddk.nih.gov/publications/index.htm    And look into health and wellness programs that may be available through your health insurance provider, employer, local community center, or tara club.    Weight management plan: continue to follow with bariatric center  Surgery:  Upper Airway Surgery for SAEID  Surgery for SAEID is a second-line treatment option in the management of sleep apnea.  Surgery should be considered for patients who are having a difficult time tolerating CPAP.    Surgery for SAEID is directed at areas that are responsible for narrowing or complete obstruction of the airway during sleep.  There  are a wide range of procedures available to enlarge and/or stabilize the airway to prevent blockage of breathing in the three major areas where it can occur: the palate, tongue, and nasal regions.  Successful surgical treatment depends on the accurate identification of the factors responsible for obstructive sleep apnea in each person.  A personalized approach is required because there is no single treatment that works well for everyone.  Because of anatomic variation, consultation with an examination by a sleep surgeon is a critical first step in determining what surgical options are best for each patient.  In some cases, examination during sedation may be recommended in order to guide the selection of procedures.  Patients will be counseled about risks and benefits as well as the typical recovery course after surgery. Surgery is typically not a cure for a person s SAEID.  However, surgery will often significantly improve one s SAEID severity (termed  success rate ).  Even in the absence of a cure, surgery will decrease the cardiovascular risk associated with OSA7; improve overall quality of life8 (sleepiness, functionality, sleep quality, etc).      Palate Procedures:  Patients with SAEID often have narrowing of their airway in the region of their tonsils and uvula.  The goals of palate procedures are to widen the airway in this region as well as to help the tissues resist collapse.  Modern palate procedure techniques focus on tissue conservation and soft tissue rearrangement, rather than tissue removal.  Often the uvula is preserved in this procedure. Residual sleep apnea is common in patient after pharyngoplasty with an average reduction in sleep apnea events of 33%2.      Tongue Procedures:  While patients are awake, the muscles that surround the throat are active and keep this region open for breathing. These muscles relax during sleep, allowing the tongue and other structures to collapse and block breathing.  There  are several different tongue procedures available.  Selection of a tongue base procedure depends on characteristics seen on physical exam.  Generally, procedures are aimed at removing bulky tissues in this area or preventing the back of the tongue from falling back during sleep.  Success rates for tongue surgery range from 50-62%3.    Hypoglossal Nerve Stimulation:  Hypoglossal nerve stimulation has recently received approval from the [place] Food and Drug Administration for the treatment of obstructive sleep apnea.  This is based on research showing that the system was safe and effective in treating sleep apnea6.  Results showed that the median AHI score decreased 68%, from 29.3 to 9.0. This therapy uses an implant system that senses breathing patterns and delivers mild stimulation to airway muscles, which keeps the airway open during sleep.  The system consists of three fully implanted components: a small generator (similar in size to a pacemaker), a breathing sensor, and a stimulation lead.  Using a small handheld remote, a patient turns the therapy on before bed and off upon awakening.    Candidates for this device must be greater than 22 years of age, have moderate to severe SAEID (AHI between 20-65), BMI less than 32, have tried CPAP/oral appliance without success, and have appropriate upper airway anatomy (determined by a sleep endoscopy performed by Dr. Rowland).    Hypoglossal Nerve Stimulation Pathway:    The sleep surgeon s office will work with the patient through the insurance prior-authorization process (including communications and appeals).    Nasal Procedures:  Nasal obstruction can interfere with nasal breathing during the day and night.  Studies have shown that relief of nasal obstruction can improve the ability of some patients to tolerate positive airway pressure therapy for obstructive sleep apnea1.  Treatment options include medications such as nasal saline, topical corticosteroid and  antihistamine sprays, and oral medications such as antihistamines or decongestants. Non-surgical treatments can include external nasal dilators for selected patients. If these are not successful by themselves, surgery can improve the nasal airway either alone or in combination with these other options.  Combination Procedures:  Combination of surgical procedures and other treatments may be recommended, particularly if patients have more than one area of narrowing or persistent positional disease.  The success rate of combination surgery ranges from 66-80%2,3.    1. eKy VILLANUEVA. The Role of the Nose in Snoring and Obstructive Sleep Apnoea: An Update.  Eur Arch Otorhinolaryngol. 2011; 268: 1365-73.  2.  Shanna SM; Armen JA; Jose Luis JR; Pallanch JF; Misti MB; Pio SG; Aiyana ABEBE. Surgical modifications of the upper airway for obstructive sleep apnea in adults: a systematic review and meta-analysis. SLEEP 2010;33(10):5639-7599. Timothy ELIZONDO. Hypopharyngeal surgery in obstructive sleep apnea: an evidence-based medicine review.  Arch Otolaryngol Head Neck Surg. 2006 Feb;132(2):206-13.  3. Prabhjot YH1, Darline Y, Romulo TISHA. The efficacy of anatomically based multilevel surgery for obstructive sleep apnea. Otolaryngol Head Neck Surg. 2003 Oct;129(4):327-35.  4. Timothy ELIZONDO, Goldberg A. Hypopharyngeal Surgery in Obstructive Sleep Apnea: An Evidence-Based Medicine Review. Arch Otolaryngol Head Neck Surg. 2006 Feb;132(2):206-13.  5. Hodan VERMA et al. Upper-Airway Stimulation for Obstructive Sleep Apnea.  N Engl J Med. 2014 Jan 9;370(2):139-49.  6. Diaan Y et al. Increased Incidence of Cardiovascular Disease in Middle-aged Men with Obstructive Sleep Apnea. Am J Respir Crit Care Med; 2002 166: 159-165  7. Nivia TENORIO et al. Studying Life Effects and Effectiveness of Palatopharyngoplasty (SLEEP) study: Subjective Outcomes of Isolated Uvulopalatopharyngoplasty. Otolaryngol Head Neck Surg. 2011; 144: 623-631.      Instructions for treating  Delayed Sleep Phase Syndrome:    Delayed Sleep Phase Syndrome (DSPS) means that your body's internal timing is set late compared to the 24 hour day. Therefore, it is often difficult to get up on time for work in the morning and sometimes difficult to fall asleep on time, in order to get enough sleep. People with DSPS often tend to like to stay up late on weekends and sleep in until between 10 AM and noon, sometimes even later.This is actually a bad habit that will perpetuate the problem. It reinforces your body's tendency to be on that later schedule.    You should go to bed when you are sleepy and ready to sleep. During this entire process, you should not engage in activities that may make it worse, such as watching TV in bed, leaving the TV on all night, drinking any caffeine 6 hours before bed or exercising 1-2 hours before bed.     Start taking Melatonin, 1 mg tablet 3-5 hours before the time that you fall asleep on average (not your desired bedtime or time that you get in bed, but the time you normally fall asleep on your own).     Upon awakening, get exposure to sun-light for about 30-45 minutes. You do not need to look at the sun, in fact, this is dangerous. Reading the paper with the sun shining on you is adequate.  Alternatively, you may use a Seasonal Affective Disorder Lamp (intensity 10,000 Lux) instead of the sun. The lamp should be positioned 1-2 arms lengths away from you. They lamps are sold at Home Medical Companies such as [employer] or [employer]. A prescription can be written to get insurance coverage in some cases. They are also sold on [employer].com.    Using the light and melatonin should help march your internal clock (known as your circadian rhythms) gradually earlier. As your bedtime advances, remember to take your melatonin earlier, keeping it 5 hours before your fall asleep time.    Avoid naps and sleeping in because sleeping during the day will delay your body's clock and you  will have to start from scratch.     More information about light therapy:  If the cost of any light box is too much, you can also purchase a compact fluorescent all spectrum light bulb at a local hardware store for about $8.  The most commonly available bulb is 1400 lumen.  You would need two of these positioned within 1 meter of yourself to be equivalent to 2,500 lux.  The bulbs can be placed in a standard light fixture.  Additionally, they can be placed in a mountable fixture that is used in greenhouses.  Mountable fixtures are also available at hardware stores for about $9.  Do not look directly at the light.  If you have any concerns regarding the safety of bright light therapy for you, it is recommended that you consult an ophthalmologist before using a light box.  If you have a condition that makes your eyes very sensitive to light, macular degeneration, a family history of such problems, or diabetic changes to your eyes, consult an ophthalmologist before using a light box. If you have anxiety disorder and have an increase in anxiety discontinue use.

## 2017-03-27 NOTE — PROGRESS NOTES
Sleep Consultation:    Date on this visit: 3/27/2017    Shobha Rader is sent by No ref. provider found for a sleep consultation regarding sleep apnea.    Primary Physician: Guerrero Benavides     Shobha Rader presents for a second opinion on previously diagnosed mild SAEID as part of a pre-bariatric surgery work-up. Her medical history is significant for asthma.    She had a PSG at [facility] on 2/22/2017. Her AHI and RDI were 10.2/hr (almost all hypopneas). Her REM AHI was 23/hr. Her O2 syed was 90%. She did not sleep supine. She had trouble falling asleep, which contributed to a sleep efficiency of 69%.    She has been trying CPAP but wakes gasping and feeling she is suffocating. Her pressures are set to 5-20 cm. She has trouble sleeping with it. She feels she sleep worse with it than without it. She uses nasal pillows mask. She denies mask leak. She finds it hard to breathe out against the pressure. She denies snoring with it on. Her  said she snored when she was pregnant but rarely snores otherwise. She puts the mask on her forehead until she is just about to fall asleep, then she will quickly put it in place. She tried putting it on earlier, but would have to remove it several times to catch her breath. She denies dry mouth.    The compliance data shows that she has used the CPAP for 17/30 nights, 20% of nights for >4 hours.  The 95th% pressure is 7.9 cm.  The 95th% leak is 3.4 lpm.  The average nightly usage is 2:28.  The average AHI is 0.1/hr.      Shobha goes to sleep at 11:00 PM during the week. She wakes up at 7:00-8:00 AM to her kids. She falls asleep in 5 minutes.  Shobha denies difficulty falling asleep.  She wakes up 1 time a night for 5 minutes before falling back to sleep if she does not use CPAP.  Shobha wakes up to go to the bathroom. With CPAP, she wakes about 4 times per night on average. She wakes taking deep breaths and adjusting the mask. On weekends, Shobha goes to  sleep at 11:00 PM.  She wakes up at 10:00 AM without an alarm. She feels more groggy when she sleeps in. She falls asleep in 5 minutes.  Patient gets an average of 8+ hours of sleep per night.     Patient does use electronics in bed and watch TV in bed (watches 1 show on her phone or looks through Facebook) and does not worry in bed about anything and read in bed.     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  She lives with her  and 3 kids (5, 3 and 8 months). None of her kids sleep very well.     Shobha has only been observed to snore when pregnant. Patient does have a regular bed partner. He is a sound sleeper. There is no report of gasping, snorting or witnessed apneas. They never sleep separately due to her snoring or other reasons.  Patient sleeps on her sides. She feels nauseous if sleeping supine since having her first child. She has occasional morning headaches (when she uses CPAP, not without), denies snort arousals, morning dry mouth, morning confusion and restless legs. She has leg pains all of the time, which she attributes to being overweight. She notices it more at night because she is not preoccupied. She has occasional restless legs. She denies a history of low iron. Shobha denies any bruxism, sleep walking, sleep talking, dream enactment, sleep paralysis, cataplexy and hypnogogic/hypnopompic hallucinations.    Shobha has claustrophobia (with CPAP), denies difficulty breathing through her nose, reflux at night, heartburn and depression.  She is on Flonase (only for seasonal allergies). In her teens, she was on melatonin for trouble sleeping.     Shobha has gained 50 pounds with her first 2 pregnancies. She lost the weight. With her third pregnancy, she started a little heavier and gained 30 pounds. She has not been able to lose that weight.  Patient describes themself as a night person.  She would prefer to go to sleep at 12:30 AM and wake up at 10:30 AM.  Patient's [place] Sleepiness score 7/24  inconsistent with daytime sleepiness.      Shobha does not have time to take naps. She would probably nap if she had time, but she does not feel better when she wakes from them. She takes rare inadvertant naps, usually only if her kids woke her during the night.  She denies dozing while driving. Patient was counseled on the importance of driving while alert, to pull over if drowsy, or nap before getting into the vehicle if sleepy.  She uses no caffeine.     Allergies:    Allergies   Allergen Reactions     Metformin Other (See Comments)     Possible bleeding     Lanolin Rash     Vicodin [Hydrocodone-Acetaminophen] Itching and Rash       Medications:    Current Outpatient Prescriptions   Medication Sig Dispense Refill     desogestrel-ethinyl estradiol (APRI) 0.15-30 MG-MCG per tablet Take 1 tablet by mouth daily       calcium carbonate (OS-ROSA 500 MG Kaw. CA) 500 MG tablet Take 500 mg by mouth 2 times daily       VITAMIN D, CHOLECALCIFEROL, PO Take by mouth daily       multivitamin, therapeutic with minerals (MULTI-VITAMIN) TABS tablet Take 1 tablet by mouth daily       Acetaminophen (TYLENOL PO)        albuterol (PROAIR HFA/PROVENTIL HFA/VENTOLIN HFA) 108 (90 BASE) MCG/ACT Inhaler Inhale 2 puffs into the lungs as needed         Problem List:  Patient Active Problem List    Diagnosis Date Noted     Obesity, Class III, BMI 40-49.9 (morbid obesity) (H) 2017     Priority: Medium     TMJ dysfunction 2017     Priority: Medium     Intertrigo 2017     Priority: Medium      (normal spontaneous vaginal delivery) 2014     Priority: Medium      contractions 2014     Priority: Medium      labor 2014     Priority: Medium     Indication for care in labor or delivery 2014     Priority: Medium        Past Medical/Surgical History:  Past Medical History:   Diagnosis Date     Asthma     Reactive airway disease      labor     with current (second) pregnancy     Past  Surgical History:   Procedure Laterality Date     MASTECTOMY PARTIAL      under R armpit     wisdom teeth         Social History:  Social History     Social History     Marital status:      Spouse name: N/A     Number of children: N/A     Years of education: N/A     Occupational History     Not on file.     Social History Main Topics     Smoking status: Never Smoker     Smokeless tobacco: Never Used     Alcohol use No     Drug use: No     Sexual activity: Yes     Partners: Male     Birth control/ protection: None     Other Topics Concern     Not on file     Social History Narrative       Family History:  Family History   Problem Relation Age of Onset     Sleep Apnea Mother      DIABETES Mother      Sleep Apnea Maternal Grandmother      DIABETES Maternal Grandmother      Sleep Apnea Maternal Grandfather      Hypertension Maternal Grandfather      Heart Failure Maternal Grandfather      CEREBROVASCULAR DISEASE Maternal Grandfather        Review of Systems:  A complete review of systems reviewed by me is negative with the exeption of what has been mentioned in the history of present illness.  CONSTITUTIONAL: NEGATIVE for weight gain/loss, fever, chills, sweats or night sweats.  CONSTITUTIONAL:  POSITIVE for  drug allergies vicodin  EYES: NEGATIVE for changes in vision, blind spots, double vision.  ENT: NEGATIVE for ear pain, sore throat, sinus pain, post-nasal drip, runny nose, bloody nose  CARDIAC: NEGATIVE for fast heartbeats or fluttering in chest, chest pain or pressure, breathlessness when lying flat, swollen legs or swollen feet.  NEUROLOGIC: NEGATIVE weakness or numbness in the arms or legs.  NEUROLOGIC:  POSITIVE for headaches  DERMATOLOGIC: NEGATIVE for rashes, new moles or change in mole(s)  PULMONARY: NEGATIVE SOB at rest, dry cough, productive cough, coughing up blood, wheezing or whistling when breathing.    PULMONARY:  POSITIVE for  SOB with activity  GASTROINTESTINAL: NEGATIVE for nausea or  "vomitting, loose or watery stools, fat or grease in stools, constipation, abdominal pain, bowel movements black in color or blood noted.  GENITOURINARY: NEGATIVE for pain during urination, blood in urine, urinating more frequently than usual, irregular menstrual periods.  MUSCULOSKELETAL: NEGATIVE for muscle pain, bone or joint pain, swollen joints.  ENDOCRINE: NEGATIVE for increased thirst or urination, diabetes.  LYMPHATIC: NEGATIVE for swollen lymph nodes, lumps or bumps in the breasts or nipple discharge.    Physical Examination:  Vitals: /71 (BP Location: Right arm, Patient Position: Chair, Cuff Size: Adult Regular)  Pulse 82  Temp 97.4  F (36.3  C) (Oral)  Ht 1.753 m (5' 9\")  Wt (!) 139.7 kg (308 lb)  SpO2 97%  BMI 45.48 kg/m2  Neck circumference: 37 cm.     [place] Total Score 3/27/2017   Total score - [place] 7       GENERAL APPEARANCE: healthy, alert, no distress and cooperative  EYES: Eyes grossly normal to inspection, PERRL, conjunctivae and sclerae normal and lids and lashes normal  HENT: nose and mouth without ulcers or lesions, oral mucous membranes moist and oropharynx clear  NECK: no adenopathy, no asymmetry, masses, or scars, thyroid normal to palpation and trachea midline and normal to palpation  RESP: lungs clear to auscultation - no rales, rhonchi or wheezes  CV: regular rates and rhythm, normal S1 S2, no S3 or S4, no murmur, click or rub and no irregular beats  LYMPHATICS: normal ant/post cervical and supraclavicular nodes  MS: extremities normal- no gross deformities noted  NEURO: Normal strength and tone, mentation intact, speech normal and cranial nerves 2-12 intact  Mallampati Class: II.  Tonsillar Stage: 2  visible at pillars.    Impression/Plan:    (G47.33) SAEID (obstructive sleep apnea)  (primary encounter diagnosis)  Comment: Mild SAEID, AHI 10/hr. She has struggled to tolerate the pressures of CPAP. It makes her sleep worse. She feels she is suffocating with it. I think she may " actually be hyperventilating with it. She sleeps on her sides, feels nauseous when on her back (with or without CPAP). She is not observed to snore except for when she is pregnant.   Plan: Changed her pressures to 4-8 cm. Try using it in the daytime when watching TV or napping. Consider full face mask if still claustrophobic. She is not at significant health risk from leaving this degree of apnea untreated. Weight loss will almost certainly resolve the apnea. The primary concern is in the postoperative period, especially if supine. I recommended that she have her CPAP with her at the surgery. Close monitoring of her oxygenation should be employed as she is coming out of anesthesia, and CPAP used if she has desaturations. She likely still needs to meet compliance goals to be able to get coverage for the CPAP, so we reviewed those and discussed strategies to try to increase hours of use.       (G47.21) Delayed sleep phase syndrome  Comment: She feels her natural sleep schedule would be 1-2 AM to about 10 AM. She says many of her family members get about 10 hours of sleep per night.  Plan: We discussed getting 30 minutes of bright light exposure in the morning, either with the sun or a SAD Lamp of 10,000 lux intensity. Avoid bright light in the evening. Take 1 mg melatonin 3-5 hours prior to natural sleep onset. Keep a consistent sleep schedule, avoiding naps and sleeping in.       Literature provided regarding sleep apnea and circadian rhythms.      She will follow up with me in approximately 1 month.       Polysomnography reviewed.  Obstructive sleep apnea reviewed.  Complications of untreated sleep apnea were reviewed.  45 minutes was spent during this visit, over 50% in counseling and coordination of care.    Bennett Goltz, PA-C    CC: No ref. provider found

## 2017-03-27 NOTE — NURSING NOTE
"Chief Complaint   Patient presents with     Sleep Problem     follow up       Initial /71 (BP Location: Right arm, Patient Position: Chair, Cuff Size: Adult Regular)  Pulse 82  Temp 97.4  F (36.3  C) (Oral)  Ht 1.753 m (5' 9\")  Wt (!) 139.7 kg (308 lb)  SpO2 97%  BMI 45.48 kg/m2 Estimated body mass index is 45.48 kg/(m^2) as calculated from the following:    Height as of this encounter: 1.753 m (5' 9\").    Weight as of this encounter: 139.7 kg (308 lb).  Medication Reconciliation: complete     Neck circumference: 37 CM  ESS:7    Luzma Umanzor MA  [facility]      "

## 2017-04-14 ENCOUNTER — OFFICE VISIT (OUTPATIENT)
Dept: SURGERY | Facility: CLINIC | Age: 32
End: 2017-04-14
Payer: COMMERCIAL

## 2017-04-14 DIAGNOSIS — E66.01 OBESITY, CLASS III, BMI 40-49.9 (MORBID OBESITY) (H): ICD-10-CM

## 2017-04-14 PROCEDURE — 97803 MED NUTRITION INDIV SUBSEQ: CPT | Performed by: DIETITIAN, REGISTERED

## 2017-04-14 NOTE — MR AVS SNAPSHOT
"                  MRN:[id number]                      After Visit Summary   2017    Shobha Rader    MRN: [id number]           Visit Information        Provider Department      2017 11:30 AM Flory Esqueda RD [facility] - [facility]      Your next 10 appointments already scheduled     2017 10:30 AM CDT   Return Sleep Patient with Bennett Ezra Goltz, PA-C   [facility] ([facility])    [address] [place] [id number]   [phone]              MyChart Information     thrdPlacet lets you send messages to your doctor, view your test results, renew your prescriptions, schedule appointments and more. To sign up, go to [email or web address] . Click on \"Log in\" on the left side of the screen, which will take you to the Welcome page. Then click on \"Sign up Now\" on the right side of the page.     You will be asked to enter the access code listed below, as well as some personal information. Please follow the directions to create your username and password.     Your access code is: [email or web address]  Expires: 2017  1:30 PM     Your access code will  in 90 days. If you need help or a new code, please call your [place] clinic or [phone].        Care EveryWhere ID     This is your Care EveryWhere ID. This could be used by other organizations to access your [place] medical records  [id number]        "

## 2017-04-14 NOTE — PROGRESS NOTES
PRE-SURGICAL WEIGHT LOSS NUTRITION APPOINTMENT  DATE OF VISIT: 2017  Name: CANDI HERNANDEZ  : 1985  Gender: Female  MRN: [id number]  Age: 31  ASSESSMENT  REASON FOR VISIT:  CANDI HERNANDEZ is a 31 year old Female presents today for a pre-surgical weight loss follow-up appointment.  DIAGNOSIS:  Class III Morbid Obesity.    ANTHROPOMETRICS:  Height: 68.75 inches  Initial Weight: 308.0 lbs  Weight last visit: 306.7 lbs  Current Weight: 302.6 lbs  BMI: 45.0 kg/m2    NUTRITION HISTORY:  Breakfast: 2 eggs, yogurt (9:00)  Lunch: turkey sandwich or salad with chicken light dressing (Caesar) (12:30)  Supper: less dining out Chili or lasagna or baked chicken with sides of green beans and mashed potatoes (6:00)  Snacks: Yogurt or cheese stick (3:00)   Drinks: skim milk and water  Consuming liquid calories: Milk  Eating 3 meals per day: Yes  Eating slower: Yes  Chewing foods thoroughly: Yes  Take 30 minutes to consume each meal: Yes  Fluids and meals separate by at least 30 minutes: Yes  Comments: 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 4 years old and under (baby is 5 months and pt is not breast feeding)  17: patient is pleased with her weight loss and seems very focused on bariatric diet guidelines; staff message sent to PAKHANH regarding sleep study feedback from patient  Desired Surgical Procedure: gastric bypass  PHYSICAL ACTIVITY:  Type: Walking;walking my dog;yard work  Frequency: 1-2x/week  Duration (min): 20  DIAGNOSIS:  Previous Nutrition Diagnosis: Obesity related to excess energy intake as evidenced by BMI of 45.6  no change, modified below  Previous goals:  Exercise 2-3 times per week (walk away the pounds)-not met  Continue practicing avoiding liquids with meals-met  Chew foods to applesauce texture-met  Current Nutrition Diagnosis: Obesity related to excess energy intake as evidenced by BMI of 45.0  IMPLEMENTATION:  Nutrition Prescription: Recommended energy/nutrient modification  Goals:  Continue to  practice all prebariatric surgery diet guidelines  Exercise 2-3 X per week for 30 minutes  Switch to decaf or herbal tea  Implementation:  - Discussed progress towards previous goals  - Reinforced importance of making behavior changes in preparation for bariatric surgery.  NUTRITION MONITORING AND EVALUATION:  Anticipated compliance: fair-good  Patient verbalized good understanding of bariatric diet guidelines.  Patient has met prebariatric surgery diet requirements.  Follow up:  # of visits needed: 0  Cleared by RD: Yes  TIME SPENT WITH PATIENT: 25 minutes  Eran Rodgers RD, LD  [facility]  [phone]

## 2017-04-19 ENCOUNTER — TELEPHONE (OUTPATIENT)
Dept: SURGERY | Facility: CLINIC | Age: 32
End: 2017-04-19

## 2017-04-19 NOTE — TELEPHONE ENCOUNTER
Called patient after dietitian stated she had questions regarding sleep study.  Discussed with patient that clinic is just waiting to hear back from sleep center that her compliance has gone up.  She had concerns about how she doesn't like wearing the CPAP but is doing her best to wear it.  Advised her that the notes say her SAEID is mild and hopefully she will be off of it soon after she has had the surgery.  She verbalized understanding.

## 2017-04-20 ENCOUNTER — OFFICE VISIT (OUTPATIENT)
Dept: SLEEP MEDICINE | Facility: CLINIC | Age: 32
End: 2017-04-20
Payer: COMMERCIAL

## 2017-04-20 VITALS
OXYGEN SATURATION: 96 % | BODY MASS INDEX: 43.4 KG/M2 | HEART RATE: 96 BPM | WEIGHT: 293 LBS | SYSTOLIC BLOOD PRESSURE: 123 MMHG | DIASTOLIC BLOOD PRESSURE: 79 MMHG | HEIGHT: 69 IN

## 2017-04-20 DIAGNOSIS — G47.21 DELAYED SLEEP PHASE SYNDROME: ICD-10-CM

## 2017-04-20 DIAGNOSIS — G47.33 OSA (OBSTRUCTIVE SLEEP APNEA): Primary | ICD-10-CM

## 2017-04-20 PROCEDURE — 99214 OFFICE O/P EST MOD 30 MIN: CPT | Performed by: PHYSICIAN ASSISTANT

## 2017-04-20 NOTE — PATIENT INSTRUCTIONS
Aim for a sleep schedule of about 11 or 11:30 AM to 7:30 AM.  If you are sleeping fairly well through the night and are tired, you may increase your sleep opportunity (go to bed earlier by 15 minutes each week). If having trouble falling asleep or sleeping through the night, go to bed 15 minutes later each week until sleeping more consistently.    Instructions for treating Delayed Sleep Phase Syndrome:    Delayed Sleep Phase Syndrome (DSPS) means that your body's internal timing is set late compared to the 24 hour day. Therefore, it is often difficult to get up on time for work in the morning and sometimes difficult to fall asleep on time, in order to get enough sleep. People with DSPS often tend to like to stay up late on weekends and sleep in until between 10 AM and noon, sometimes even later.This is actually a bad habit that will perpetuate the problem. It reinforces your body's tendency to be on that later schedule.    You should go to bed when you are sleepy and ready to sleep. During this entire process, you should not engage in activities that may make it worse, such as watching TV in bed, leaving the TV on all night, drinking any caffeine 6 hours before bed or exercising 1-2 hours before bed.     Start taking Melatonin, 1 mg tablet 3-5 hours before the time that you fall asleep on average (not your desired bedtime or time that you get in bed, but the time you normally fall asleep on your own).     Upon awakening, get exposure to sun-light for about 30-45 minutes. You do not need to look at the sun, in fact, this is dangerous. Reading the paper with the sun shining on you is adequate.  Alternatively, you may use a Seasonal Affective Disorder Lamp (intensity 10,000 Lux) instead of the sun. The lamp should be positioned 1-2 arms lengths away from you. They lamps are sold at Home Medical Companies such as [employer] or [employer]. A prescription can be written to get insurance coverage in some  cases. They are also sold on [employer].    Using the light and melatonin should help march your internal clock (known as your circadian rhythms) gradually earlier. As your bedtime advances, remember to take your melatonin earlier, keeping it 5 hours before your fall asleep time.    Avoid naps and sleeping in because sleeping during the day will delay your body's clock and you will have to start from scratch.     More information about light therapy:  If the cost of any light box is too much, you can also purchase a compact fluorescent all spectrum light bulb at a local hardware store for about $8.  The most commonly available bulb is 1400 lumen.  You would need two of these positioned within 1 meter of yourself to be equivalent to 2,500 lux.  The bulbs can be placed in a standard light fixture.  Additionally, they can be placed in a mountable fixture that is used in greenhouses.  Mountable fixtures are also available at hardware stores for about $9.  Do not look directly at the light.  If you have any concerns regarding the safety of bright light therapy for you, it is recommended that you consult an ophthalmologist before using a light box.  If you have a condition that makes your eyes very sensitive to light, macular degeneration, a family history of such problems, or diabetic changes to your eyes, consult an ophthalmologist before using a light box. If you have anxiety disorder and have an increase in anxiety discontinue use.    Your BMI is Body mass index is 44.42 kg/(m^2).  Weight management is a personal decision.  If you are interested in exploring weight loss strategies, the following discussion covers the approaches that may be successful. Body mass index (BMI) is one way to tell whether you are at a healthy weight, overweight, or obese. It measures your weight in relation to your height.  A BMI of 18.5 to 24.9 is in the healthy range. A person with a BMI of 25 to 29.9 is considered overweight, and someone  with a BMI of 30 or greater is considered obese. More than two-thirds of American adults are considered overweight or obese.  Being overweight or obese increases the risk for further weight gain. Excess weight may lead to heart disease and diabetes.  Creating and following plans for healthy eating and physical activity may help you improve your health.  Weight control is part of healthy lifestyle and includes exercise, emotional health, and healthy eating habits. Careful eating habits lifelong are the mainstay of weight control. Though there are significant health benefits from weight loss, long-term weight loss with diet alone may be very difficult to achieve- studies show long-term success with dietary management in less than 10% of people. Attaining a healthy weight may be especially difficult to achieve in those with severe obesity. In some cases, medications, devices and surgical management might be considered.  What can you do?  If you are overweight or obese and are interested in methods for weight loss, you should discuss this with your provider.     Consider reducing daily calorie intake by 500 calories.     Keep a food journal.     Avoiding skipping meals, consider cutting portions instead.    Diet combined with exercise helps maintain muscle while optimizing fat loss. Strength training is particularly important for building and maintaining muscle mass. Exercise helps reduce stress, increase energy, and improves fitness. Increasing exercise without diet control, however, may not burn enough calories to loose weight.       Start walking three days a week 10-20 minutes at a time    Work towards walking thirty minutes five days a week     Eventually, increase the speed of your walking for 1-2 minutes at time    In addition, we recommend that you review healthy lifestyles and methods for weight loss available through the National Institutes of Health patient information  sites:  http://win.niddk.nih.gov/publications/index.htm    And look into health and wellness programs that may be available through your health insurance provider, employer, local community center, or tara club.    Weight management plan: Patient was referred to their PCP to discuss a diet and exercise plan.

## 2017-04-20 NOTE — PROGRESS NOTES
Sleep Study Follow-Up Visit:    Date on this visit: 4/20/2017    Shobha Rader comes in today for follow-up of her CPAP use for mild SAEID. She was initially seen at the [facility] for a second opinion on previously diagnosed mild SAEID as part of a pre-bariatric surgery work-up. Her medical history is significant for asthma.     She had a PSG at [facility] on 2/22/2017. Her AHI and RDI were 10.2/hr (almost all hypopneas). Her REM AHI was 23/hr. Her O2 syed was 90%. She did not sleep supine. She had trouble falling asleep, which contributed to a sleep efficiency of 69%.  Her CPAP pressures were changed to 4-8 cm. She feels the CPAP is more comfortable. She gets 4 hours of use but still rips it off in the night without knowing it. She notices some itchy or irritated nose. She denies snoring with CPAP. She continues with nasal pillows mask. Her humidifier is set on auto climate control. She is no longer having headaches. CPAP compliance is the only remaining barrier to getting her bariatric surgery.   The compliance data shows that she has used the CPAP for 28/30 nights, 70% of nights for >4 hours.  The 95th% pressure is 7.7 cm.  The 95th% leak is 0.7 cm.  The average nightly usage is 4:01, median is 4:18.  The average AHI is 0.2/hr.    She has not been working on the tips to advance her circadian rhythms. She has advanced her bedtime from 11 PM to 9:30-10 PM. She has been able to get to sleep. Her kids have been waking around 3 AM with illnesses. She has struggled to get back to sleep then. She is not napping. She has not tried melatonin. She is up around 7:30-8 AM. That is when her kids wake. On weekends, she no longer sleeps in. She has to take her dogs to training. She may sleep in until 8:30 AM.     Past medical/surgical history, family history, social history, medications and allergies were reviewed.      Problem List:  Patient Active Problem List    Diagnosis Date Noted      Obesity, Class III, BMI 40-49.9 (morbid obesity) (H) 2017     Priority: Medium     TMJ dysfunction 2017     Priority: Medium     Intertrigo 2017     Priority: Medium      (normal spontaneous vaginal delivery) 2014     Priority: Medium      contractions 2014     Priority: Medium      labor 2014     Priority: Medium     Indication for care in labor or delivery 2014     Priority: Medium        Impression/Plan:    (G47.33) SAEID (obstructive sleep apnea)  (primary encounter diagnosis)  Comment: Compliance goals are being met barely. She has some irritation from the mask. She might try a nasal mask from her mother.  Plan: Continue auto CPAP 4-8 cm. She was advised to continue using the CPAP as much as possible. Bring CPAP to the surgery and have it available post-operatively. She was given contact information for [facility] and we reviewed when to get new supplies.    (G47.21) Delayed sleep phase syndrome  Comment: She has advanced her sleep schedule, but is waking in the night (which may be due in part to her children waking from illness). I think she may have tried too much of an advancement and she is likely still in bed longer than needed, which can contribute to prolonged awakenings.  Plan: Aim for a sleep schedule of about 11 or 11:30 AM to 7:30 AM. I discussed how to adjust her sleep schedule to help optimize her sleep continuity. We also reviewed bright light and melatonin use recommendations.       She will follow up with me in about 6 month(s), once she has lost weight, so we can see if her SAEID is resolved.     Twenty-five minutes spent with patient, all of which were spent face-to-face counseling, consulting, coordinating plan of care.      Bennett Goltz, PA-C    CC: No ref. provider found

## 2017-04-20 NOTE — MR AVS SNAPSHOT
After Visit Summary   4/20/2017    Shobha Rader    MRN: [id number]           Patient Information     Date Of Birth          1985        Visit Information        Provider Department      4/20/2017 10:30 AM Goltz, Bennett Ezra, PA-C [facility]        Today's Diagnoses     SAEID (obstructive sleep apnea)    -  1    Delayed sleep phase syndrome          Care Instructions    Aim for a sleep schedule of about 11 or 11:30 AM to 7:30 AM.  If you are sleeping fairly well through the night and are tired, you may increase your sleep opportunity (go to bed earlier by 15 minutes each week). If having trouble falling asleep or sleeping through the night, go to bed 15 minutes later each week until sleeping more consistently.    Instructions for treating Delayed Sleep Phase Syndrome:    Delayed Sleep Phase Syndrome (DSPS) means that your body's internal timing is set late compared to the 24 hour day. Therefore, it is often difficult to get up on time for work in the morning and sometimes difficult to fall asleep on time, in order to get enough sleep. People with DSPS often tend to like to stay up late on weekends and sleep in until between 10 AM and noon, sometimes even later.This is actually a bad habit that will perpetuate the problem. It reinforces your body's tendency to be on that later schedule.    You should go to bed when you are sleepy and ready to sleep. During this entire process, you should not engage in activities that may make it worse, such as watching TV in bed, leaving the TV on all night, drinking any caffeine 6 hours before bed or exercising 1-2 hours before bed.     Start taking Melatonin, 1 mg tablet 3-5 hours before the time that you fall asleep on average (not your desired bedtime or time that you get in bed, but the time you normally fall asleep on your own).     Upon awakening, get exposure to sun-light for about 30-45 minutes. You do not need to look at the sun, in  fact, this is dangerous. Reading the paper with the sun shining on you is adequate.  Alternatively, you may use a Seasonal Affective Disorder Lamp (intensity 10,000 Lux) instead of the sun. The lamp should be positioned 1-2 arms lengths away from you. They lamps are sold at Home Medical Companies such as [employer] or [employer]. A prescription can be written to get insurance coverage in some cases. They are also sold on [employer].com.    Using the light and melatonin should help march your internal clock (known as your circadian rhythms) gradually earlier. As your bedtime advances, remember to take your melatonin earlier, keeping it 5 hours before your fall asleep time.    Avoid naps and sleeping in because sleeping during the day will delay your body's clock and you will have to start from scratch.     More information about light therapy:  If the cost of any light box is too much, you can also purchase a compact fluorescent all spectrum light bulb at a local hardware store for about $8.  The most commonly available bulb is 1400 lumen.  You would need two of these positioned within 1 meter of yourself to be equivalent to 2,500 lux.  The bulbs can be placed in a standard light fixture.  Additionally, they can be placed in a mountable fixture that is used in greenhouses.  Mountable fixtures are also available at hardware stores for about $9.  Do not look directly at the light.  If you have any concerns regarding the safety of bright light therapy for you, it is recommended that you consult an ophthalmologist before using a light box.  If you have a condition that makes your eyes very sensitive to light, macular degeneration, a family history of such problems, or diabetic changes to your eyes, consult an ophthalmologist before using a light box. If you have anxiety disorder and have an increase in anxiety discontinue use.    Your BMI is Body mass index is 44.42 kg/(m^2).  Weight management is a personal  decision.  If you are interested in exploring weight loss strategies, the following discussion covers the approaches that may be successful. Body mass index (BMI) is one way to tell whether you are at a healthy weight, overweight, or obese. It measures your weight in relation to your height.  A BMI of 18.5 to 24.9 is in the healthy range. A person with a BMI of 25 to 29.9 is considered overweight, and someone with a BMI of 30 or greater is considered obese. More than two-thirds of American adults are considered overweight or obese.  Being overweight or obese increases the risk for further weight gain. Excess weight may lead to heart disease and diabetes.  Creating and following plans for healthy eating and physical activity may help you improve your health.  Weight control is part of healthy lifestyle and includes exercise, emotional health, and healthy eating habits. Careful eating habits lifelong are the mainstay of weight control. Though there are significant health benefits from weight loss, long-term weight loss with diet alone may be very difficult to achieve- studies show long-term success with dietary management in less than 10% of people. Attaining a healthy weight may be especially difficult to achieve in those with severe obesity. In some cases, medications, devices and surgical management might be considered.  What can you do?  If you are overweight or obese and are interested in methods for weight loss, you should discuss this with your provider.     Consider reducing daily calorie intake by 500 calories.     Keep a food journal.     Avoiding skipping meals, consider cutting portions instead.    Diet combined with exercise helps maintain muscle while optimizing fat loss. Strength training is particularly important for building and maintaining muscle mass. Exercise helps reduce stress, increase energy, and improves fitness. Increasing exercise without diet control, however, may not burn enough calories  "to loose weight.       Start walking three days a week 10-20 minutes at a time    Work towards walking thirty minutes five days a week     Eventually, increase the speed of your walking for 1-2 minutes at time    In addition, we recommend that you review healthy lifestyles and methods for weight loss available through the National Institutes of Health patient information sites:  http://win.niddk.nih.gov/publications/index.htm    And look into health and wellness programs that may be available through your health insurance provider, employer, local community center, or tara club.    Weight management plan: Patient was referred to their PCP to discuss a diet and exercise plan.            Follow-ups after your visit        Who to contact     If you have questions or need follow up information about today's clinic visit or your schedule please contact [facility] directly at [phone].  Normal or non-critical lab and imaging results will be communicated to you by CivilGEOhart, letter or phone within 4 business days after the clinic has received the results. If you do not hear from us within 7 days, please contact the clinic through [employer] or phone. If you have a critical or abnormal lab result, we will notify you by phone as soon as possible.  Submit refill requests through VidSys or call your pharmacy and they will forward the refill request to us. Please allow 3 business days for your refill to be completed.          Additional Information About Your Visit        VidSys Information     VidSys lets you send messages to your doctor, view your test results, renew your prescriptions, schedule appointments and more. To sign up, go to [email or web address] . Click on \"Log in\" on the left side of the screen, which will take you to the Welcome page. Then click on \"Sign up Now\" on the right side of the page.     You will be asked to enter the access code listed below, as well as some personal " "information. Please follow the directions to create your username and password.     Your access code is: [email or web address]  Expires: 2017  1:30 PM     Your access code will  in 90 days. If you need help or a new code, please call your [place] clinic or [phone].        Care EveryWhere ID     This is your Care EveryWhere ID. This could be used by other organizations to access your [place] medical records  [id number]        Your Vitals Were     Pulse Height Pulse Oximetry BMI (Body Mass Index)          96 1.753 m (5' 9\") 96% 44.42 kg/m2         Blood Pressure from Last 3 Encounters:   17 123/79   17 122/71   17 125/79    Weight from Last 3 Encounters:   17 (!) 136.4 kg (300 lb 12.8 oz)   17 (!) 139.7 kg (308 lb)   17 (!) 140.1 kg (308 lb 12.8 oz)              Today, you had the following     No orders found for display       Primary Care Provider Office Phone # Fax #    Guerrero Benavides [phone]       OB GYN [address] DR HARDIN [address] [place] [id number]        Thank you!     Thank you for choosing [facility]  for your care. Our goal is always to provide you with excellent care. Hearing back from our patients is one way we can continue to improve our services. Please take a few minutes to complete the written survey that you may receive in the mail after your visit with us. Thank you!             Your Updated Medication List - Protect others around you: Learn how to safely use, store and throw away your medicines at www.disposemymeds.org.          This list is accurate as of: 17 11:15 AM.  Always use your most recent med list.                   Brand Name Dispense Instructions for use    albuterol 108 (90 BASE) MCG/ACT Inhaler    PROAIR HFA/PROVENTIL HFA/VENTOLIN HFA     Inhale 2 puffs into the lungs as needed       calcium carbonate 500 MG tablet    OS-ROSA 500 mg [place]. Ca     Take 500 mg by mouth 2 times daily "       desogestrel-ethinyl estradiol 0.15-30 MG-MCG per tablet    APRI     Take 1 tablet by mouth daily       Multi-vitamin Tabs tablet      Take 1 tablet by mouth daily       TYLENOL PO          VITAMIN D (CHOLECALCIFEROL) PO      Take by mouth daily

## 2017-05-04 ENCOUNTER — OFFICE VISIT (OUTPATIENT)
Dept: SURGERY | Facility: CLINIC | Age: 32
End: 2017-05-04
Payer: COMMERCIAL

## 2017-05-04 VITALS
WEIGHT: 293 LBS | BODY MASS INDEX: 44.48 KG/M2 | SYSTOLIC BLOOD PRESSURE: 133 MMHG | HEART RATE: 97 BPM | DIASTOLIC BLOOD PRESSURE: 84 MMHG

## 2017-05-04 DIAGNOSIS — E66.01 MORBID OBESITY WITH BMI OF 40.0-44.9, ADULT (H): Primary | ICD-10-CM

## 2017-05-04 PROCEDURE — 99214 OFFICE O/P EST MOD 30 MIN: CPT | Performed by: SURGERY

## 2017-05-04 NOTE — LETTER
[facility]          [address]  [address]  [place] [address]                                         Tel:  [phone]  Fax: [phone]          May 4, 2017    RE:  Shobha Rader-:  [birth year]    Dear ,     I had the pleasure of meeting with your patient Shobha Rader recently in my weight loss surgery offices. This patient is a 31 year old female who has been undergoing our preoperative screening process in anticipation of potential bariatric surgery. She states that She has been overweight for several years and feels that She has been Morbidly obese for at least the last 8 years. She has been unsuccessful with other methods of permanent weight loss including low-carb diet, diet and exercise routines. At the time of her initial presentation her weight was 308 pounds and her BMI was 45.9. She Suffers from multiple weight related medical conditions including asthma, low back pain, prediabetes, and skin fold rashes. Due to lack of success in achieving weight loss through other methods She is interested in undergoing bariatric surgery.     To date She Has undergone an appropriate medical evaluation, dietitian evaluation, as well as psychologic screening. She Appears to be an appropriate candidate for bariatric surgery.     In the office today with early discussed surgical options for weight loss. The patient has expressed interest in undergoing laparoscopic Taz-en-Y gastric bypass. We therefore discussed this procedure in great detail. I reviewed with the patient the risks, benefits and anticipated outcomes. I believe that this patient has an excellent chance to achieve permanent weight loss to this procedure. This should also allow for improvement if not resolution in the weight related medical conditions. At this point we are going to submit for prior authorization. In anticipation of prior authorization I am hoping for a surgery date in the near future.  All of the patient's questions were appropriately answered to satisfaction. We will keep you updated on any progress. If you have questions regarding care please feel free to contact me.      Sincerely,     Austin Melara M.D.

## 2017-05-04 NOTE — PROGRESS NOTES
Dear ,    I had the pleasure of meeting with your patient Shobha Rader recently in my weight loss surgery offices.  This patient is a 31 year old female who has been undergoing our preoperative screening process in anticipation of potential bariatric surgery. She states that She has been overweight for several years and feels that She has been Morbidly obese for at least the last 8 years. She has been unsuccessful with other methods of permanent weight loss including low-carb diet, diet and exercise routines.  At the time of her initial presentation her weight was 308 pounds and her BMI was 45.9. She Suffers from multiple weight related medical conditions including asthma, low back pain, prediabetes, and skin fold rashes.  Due to lack of success in achieving weight loss through other methods She is interested in undergoing bariatric surgery.    To date She Has undergone an appropriate medical evaluation, dietitian evaluation, as well as psychologic screening. She Appears to be an appropriate candidate for bariatric surgery.    In the office today with early discussed surgical options for weight loss.  The patient has expressed interest in undergoing laparoscopic Taz-en-Y gastric bypass.  We therefore discussed this procedure in great detail.  I reviewed with the patient the risks, benefits and anticipated outcomes.  I believe that this patient has an excellent chance to achieve permanent weight loss to this procedure.  This should also allow for improvement if not resolution in the weight related medical conditions.  At this point we are going to submit for prior authorization.  In anticipation of prior authorization I am hoping for a surgery date in the near future.   All of the patient's questions were appropriately answered to satisfaction.  We will keep you updated on any progress.  If you have questions regarding care please feel free to contact me.  Total time spent in the clinic was 45 minutes  with greater than 50% in face-to-face consultation.    Sincerely,    Austin Melara M.D.    Please route or send letter to:  Primary Care Provider (PCP) and Referring Provider

## 2017-05-04 NOTE — MR AVS SNAPSHOT
After Visit Summary   5/4/2017    Shobha Rader    MRN: [id number]           Patient Information     Date Of Birth          1985        Visit Information        Provider Department      5/4/2017 1:00 PM Austin Melara MD [facility]      Today's Diagnoses     Morbid obesity with BMI of 40.0-44.9, adult (H)    -  1       Follow-ups after your visit        Who to contact     If you have questions or need follow up information about today's clinic visit or your schedule please contact [facility] directly at [phone].  Normal or non-critical lab and imaging results will be communicated to you by MyChart, letter or phone within 4 business days after the clinic has received the results. If you do not hear from us within 7 days, please contact the clinic through Glidehart or phone. If you have a critical or abnormal lab result, we will notify you by phone as soon as possible.  Submit refill requests through [email or web address] or call your pharmacy and they will forward the refill request to us. Please allow 3 business days for your refill to be completed.          Additional Information About Your Visit        MyChart Information     [email or web address] gives you secure access to your electronic health record. If you see a primary care provider, you can also send messages to your care team and make appointments. If you have questions, please call your primary care clinic.  If you do not have a primary care provider, please call [phone] and they will assist you.        Care EveryWhere ID     This is your Care EveryWhere ID. This could be used by other organizations to access your [facility] medical records  [id number]        Your Vitals Were     Pulse BMI (Body Mass Index)                97 44.48 kg/m2           Blood Pressure from Last 3 Encounters:   05/04/17 133/84   04/20/17 123/79   03/27/17 122/71     Weight from Last 3 Encounters:   05/04/17 (!) 301 lb 3.2 oz (136.6 kg)   04/20/17 (!) 300 lb 12.8 oz (136.4 kg)   03/27/17 (!) 308 lb (139.7 kg)              We Performed the Following     OP ROOMING NOTE TO MSDONTA        Primary Care Provider Office Phone # Fax #    Guerrero Benavides [phone]       OB GYN [address] HARDIN [address] [place] [address]        Thank you!     Thank you for choosing [facility]  for your care. Our goal is always to provide you with excellent care. Hearing back from our patients is one way we can continue to improve our services. Please take a few minutes to complete the written survey that you may receive in the mail after your visit with us. Thank you!             Your Updated Medication List - Protect others around you: Learn how to safely use, store and throw away your medicines at www.disposemymeds.org.          This list is accurate as of: 5/4/17  2:55 PM.  Always use your most recent med list.                   Brand Name Dispense Instructions for use    albuterol 108 (90 BASE) MCG/ACT Inhaler    PROAIR HFA/PROVENTIL HFA/VENTOLIN HFA     Inhale 2 puffs into the lungs as needed       calcium carbonate 500 MG tablet    OS-ROSA 500 mg [place]. Ca     Take 500 mg by mouth 2 times daily       desogestrel-ethinyl estradiol 0.15-30 MG-MCG per tablet    APRI     Take 1 tablet by mouth daily       Multi-vitamin Tabs tablet      Take 1 tablet by mouth daily       TYLENOL PO          VITAMIN D (CHOLECALCIFEROL) PO      Take by mouth daily

## 2017-05-19 ENCOUNTER — OFFICE VISIT (OUTPATIENT)
Dept: SURGERY | Facility: CLINIC | Age: 32
End: 2017-05-19
Payer: COMMERCIAL

## 2017-05-19 DIAGNOSIS — E66.01 OBESITY, CLASS III, BMI 40-49.9 (MORBID OBESITY) (H): ICD-10-CM

## 2017-05-19 PROCEDURE — 97803 MED NUTRITION INDIV SUBSEQ: CPT | Performed by: DIETITIAN, REGISTERED

## 2017-05-19 NOTE — MR AVS SNAPSHOT
MRN:[id number]                      After Visit Summary   5/19/2017    Shobha Rader    MRN: [id number]           Visit Information        Provider Department      5/19/2017 3:00 PM 3, Sh Wl Diet, RD [facility]      Your next 10 appointments already scheduled     Jun 20, 2017 11:00 AM CDT   Weight Loss Visit with Sh Wl Diet 3, RD   [facility] [place] ([facility])    [address] [place] [phone]              MyChart Information     [employer] gives you secure access to your electronic health record. If you see a primary care provider, you can also send messages to your care team and make appointments. If you have questions, please call your primary care clinic.  If you do not have a primary care provider, please call [phone] and they will assist you.        Care EveryWhere ID     This is your Care EveryWhere ID. This could be used by other organizations to access your [place] medical records  [id number]

## 2017-05-19 NOTE — PROGRESS NOTES
PRE-SURGICAL WEIGHT LOSS NUTRITION APPOINTMENT  DATE OF VISIT: 2017  Name: CANDI HERNANDEZ  : 1985  Gender: Female  MRN: [id number]  Age: 31  ASSESSMENT  REASON FOR VISIT:  CANDI HERNANDEZ is a 31 year old Female presents today for a pre-surgical weight loss follow-up appointment.  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 Pt shares this was emotionally discouraging which has caused her to re-evaluate if surgery is the route she wants to go. Pt wondering about medical weight loss. Discussed getting serious about weight loss over the next month; if pt continues to be successful, may opt for referral to [facility] medical weight loss at next visit rather than pursuing surgery. Has tentative surgery date of .   Desired Surgical Procedure: Laparoscopic Gastric Bypass  PHYSICAL ACTIVITY:  Type: Exercise videos  Frequency:  1-2x/week  Duration (min): 20  DIAGNOSIS:  Previous Nutrition Diagnosis: Obesity related to excess energy intake as evidenced by BMI of 45.0kg/m2.   Unchanged, modified below.   Previous goals:  Continue to practice all prebariatric surgery diet guidelines - met  Exercise 2-3 X per week for 30 minutes - improving  Switch to decaf or herbal tea - met  Current Nutrition Diagnosis: Obesity related to excess energy intake as evidenced by BMI of 45.3kg/m2.   IMPLEMENTATION:  Nutrition Prescription: Recommended energy/nutrient modification  Goals:  Continue all pre-op dietary guidelines regarding chewing, timing of meals and liquids.  Increase exercise gradually each week until goal of 5 days/week.  Reduce portions of starch by 50%.   Implementation:  - Discussed progress towards previous goals  - Reinforced importance of making behavior changes in preparation for bariatric surgery.  NUTRITION MONITORING AND EVALUATION:  Anticipated compliance: Good  Patient verbalized good understanding of bariatric diet guidelines.  Patient has met prebariatric surgery diet requirements   Follow up: 1 month  # of visits needed: 1  Cleared by RD: Yes (previously cleared; see comments)  TIME SPENT WITH PATIENT: 20 minutes  Ila Benson RD, LD  Clinical Dietitian

## 2017-06-20 ENCOUNTER — OFFICE VISIT (OUTPATIENT)
Dept: SURGERY | Facility: CLINIC | Age: 32
End: 2017-06-20
Payer: COMMERCIAL

## 2017-06-20 DIAGNOSIS — E66.01 OBESITY, CLASS III, BMI 40-49.9 (MORBID OBESITY) (H): ICD-10-CM

## 2017-06-20 PROCEDURE — 97803 MED NUTRITION INDIV SUBSEQ: CPT | Performed by: DIETITIAN, REGISTERED

## 2017-06-20 NOTE — PROGRESS NOTES
PRE-SURGICAL WEIGHT LOSS NUTRITION APPOINTMENT  DATE OF VISIT: 2017  Name: CANDI HERNANDEZ  : 1985  Gender: Female  MRN: [id number]  Age: 31  ASSESSMENT  REASON FOR VISIT:  CANDI HERNANDEZ is a 31 year old Female presents today for a pre-surgical weight loss follow-up appointment.  DIAGNOSIS:  Class III Obesity    ANTHROPOMETRICS:  Height: 68.75 inches  Initial Weight: 308.0 lbs  Weight last visit: 304.7 lbs  Current Weight: 305.3 lbs  BMI: 45.4 kg/m2    NUTRITION HISTORY:  Breakfast: yogurt, milk  Lunch: leftovers from dinner or sandwich or salad   Supper: chili, meatloaf, pinwheels, fast food occasionally, BLT, brats  Snacks: none  Drinks: skim milk and water, SF lemonade  Consuming liquid calories: Milk  Eating 3 meals per day: Yes  Eating slower: Yes  Chewing foods thoroughly: Yes  Take 30 minutes to consume each meal: Yes  Fluids and meals separate by at least 30 minutes: Yes  Comments: 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 Pt shares this was emotionally discouraging which has caused her to re-evaluate if surgery is the route she wants to go. Pt wondering about medical weight loss. Discussed getting serious about weight loss over the next month; if pt continues to be successful, may opt for referral to [facility] medical weight loss at next visit rather than pursuing surgery. Has tentative surgery date of .     : Pt thinks weight is up a pound d/t water retention from period, has no concerns  about getting back down to 303 by pre-op class.   Desired Surgical Procedure: Laparoscopic Gastric Bypass  PHYSICAL ACTIVITY:  Walking for 30 minutes, 2-4x/week  DIAGNOSIS:  Previous Nutrition Diagnosis: Obesity related to excess energy intake as evidenced by BMI of 45.3kg/m2.   Unchanged, modified below.   Previous goals:  Continue to practice all prebariatric surgery diet guidelines  Continue all pre-op dietary guidelines regarding chewing, timing of meals and liquids. Increase exercise gradually each week until goal of 5 days/week. Reduce portions of starch by 50%.   Current Nutrition Diagnosis: Obesity related to excess energy intake as evidenced by BMI of 45.4 kg/m2.   IMPLEMENTATION:  Nutrition Prescription: Recommended energy/nutrient modification  Goals:  Continue to follow all pre-op dietary guidelines.   Implementation:  - Discussed progress towards previous goals  - Reinforced importance of making behavior changes in preparation for bariatric surgery.  NUTRITION MONITORING AND EVALUATION:  Anticipated compliance: Good  Patient verbalized good understanding of bariatric diet guidelines.  Patient has met prebariatric surgery diet requirements.  Follow up: Standard post-operative follow up at clinic  # of visits needed: 0  Cleared by RD: Yes  TIME SPENT WITH PATIENT: 20 minutes  Ila Benson RD, LD  Clinical Dietitian

## 2017-06-20 NOTE — MR AVS SNAPSHOT
MRN:[id number]                      After Visit Summary   6/20/2017    Shobha Rader    MRN: [id number]           Visit Information        Provider Department      6/20/2017 11:00 AM 3, Sh Fantasma Rubio RD [facility] - [facility]      MyChart Information     [facility] gives you secure access to your electronic health record. If you see a primary care provider, you can also send messages to your care team and make appointments. If you have questions, please call your primary care clinic.  If you do not have a primary care provider, please call [phone] and they will assist you.        Care EveryWhere ID     This is your Care EveryWhere ID. This could be used by other organizations to access your [facility] medical records  [id number]

## 2017-07-14 ENCOUNTER — TELEPHONE (OUTPATIENT)
Dept: SURGERY | Facility: CLINIC | Age: 32
End: 2017-07-14

## 2017-07-14 NOTE — TELEPHONE ENCOUNTER
Called patient and verified she would be bringing CPAP to the hospital.  She has been using the past 5 days or so but took a break because of her allergies.  Today she has a scratchy throat.  Advised her she can take otc antihistamine for the next couple of days to see if that helps her throat.  Denies any fever or other symptoms.  She verbalized understanding.

## 2017-07-19 ENCOUNTER — SURGERY (OUTPATIENT)
Age: 32
End: 2017-07-19

## 2017-07-19 ENCOUNTER — HOSPITAL ENCOUNTER (INPATIENT)
Facility: CLINIC | Age: 32
LOS: 2 days | Discharge: HOME | DRG: 621 | End: 2017-07-21
Attending: SURGERY | Admitting: SURGERY
Payer: COMMERCIAL

## 2017-07-19 ENCOUNTER — ANESTHESIA EVENT (OUTPATIENT)
Dept: SURGERY | Facility: CLINIC | Age: 32
DRG: 621 | End: 2017-07-19
Payer: COMMERCIAL

## 2017-07-19 ENCOUNTER — ANESTHESIA (OUTPATIENT)
Dept: SURGERY | Facility: CLINIC | Age: 32
DRG: 621 | End: 2017-07-19
Payer: COMMERCIAL

## 2017-07-19 ENCOUNTER — APPOINTMENT (OUTPATIENT)
Dept: SURGERY | Facility: PHYSICIAN GROUP | Age: 32
End: 2017-07-19
Payer: COMMERCIAL

## 2017-07-19 DIAGNOSIS — Z98.84 BARIATRIC SURGERY STATUS: ICD-10-CM

## 2017-07-19 DIAGNOSIS — Z98.890 PONV (POSTOPERATIVE NAUSEA AND VOMITING): ICD-10-CM

## 2017-07-19 DIAGNOSIS — G89.18 ACUTE POST-OPERATIVE PAIN: ICD-10-CM

## 2017-07-19 DIAGNOSIS — R11.2 PONV (POSTOPERATIVE NAUSEA AND VOMITING): ICD-10-CM

## 2017-07-19 DIAGNOSIS — E66.01 MORBID OBESITY WITH BMI OF 45.0-49.9, ADULT (H): Primary | ICD-10-CM

## 2017-07-19 LAB
CREAT SERPL-MCNC: 0.62 MG/DL (ref 0.52–1.04)
GFR SERPL CREATININE-BSD FRML MDRD: NORMAL ML/MIN/1.7M2
HCG SERPL QL: NEGATIVE
PLATELET # BLD AUTO: 249 10E9/L (ref 150–450)

## 2017-07-19 PROCEDURE — 25000566 ZZH SEVOFLURANE, EA 15 MIN: Performed by: SURGERY

## 2017-07-19 PROCEDURE — 25000128 H RX IP 250 OP 636: Performed by: ANESTHESIOLOGY

## 2017-07-19 PROCEDURE — 84703 CHORIONIC GONADOTROPIN ASSAY: CPT | Performed by: ANESTHESIOLOGY

## 2017-07-19 PROCEDURE — 27210794 ZZH OR GENERAL SUPPLY STERILE: Performed by: SURGERY

## 2017-07-19 PROCEDURE — 36415 COLL VENOUS BLD VENIPUNCTURE: CPT | Performed by: ANESTHESIOLOGY

## 2017-07-19 PROCEDURE — 25000132 ZZH RX MED GY IP 250 OP 250 PS 637

## 2017-07-19 PROCEDURE — 71000013 ZZH RECOVERY PHASE 1 LEVEL 1 EA ADDTL HR: Performed by: SURGERY

## 2017-07-19 PROCEDURE — 36000063 ZZH SURGERY LEVEL 4 EA 15 ADDTL MIN: Performed by: SURGERY

## 2017-07-19 PROCEDURE — 25000128 H RX IP 250 OP 636: Performed by: PHYSICIAN ASSISTANT

## 2017-07-19 PROCEDURE — 12000007 ZZH R&B INTERMEDIATE

## 2017-07-19 PROCEDURE — 25000128 H RX IP 250 OP 636: Performed by: SURGERY

## 2017-07-19 PROCEDURE — 43644 LAP GASTRIC BYPASS/ROUX-EN-Y: CPT | Mod: AS | Performed by: PHYSICIAN ASSISTANT

## 2017-07-19 PROCEDURE — 37000009 ZZH ANESTHESIA TECHNICAL FEE, EACH ADDTL 15 MIN: Performed by: SURGERY

## 2017-07-19 PROCEDURE — 25000125 ZZHC RX 250

## 2017-07-19 PROCEDURE — 43644 LAP GASTRIC BYPASS/ROUX-EN-Y: CPT | Performed by: SURGERY

## 2017-07-19 PROCEDURE — 40000170 ZZH STATISTIC PRE-PROCEDURE ASSESSMENT II: Performed by: SURGERY

## 2017-07-19 PROCEDURE — 25800025 ZZH RX 258: Performed by: SURGERY

## 2017-07-19 PROCEDURE — 71000012 ZZH RECOVERY PHASE 1 LEVEL 1 FIRST HR: Performed by: SURGERY

## 2017-07-19 PROCEDURE — 37000008 ZZH ANESTHESIA TECHNICAL FEE, 1ST 30 MIN: Performed by: SURGERY

## 2017-07-19 PROCEDURE — 25000125 ZZHC RX 250: Performed by: ANESTHESIOLOGY

## 2017-07-19 PROCEDURE — 0D164ZA BYPASS STOMACH TO JEJUNUM, PERCUTANEOUS ENDOSCOPIC APPROACH: ICD-10-PCS | Performed by: SURGERY

## 2017-07-19 PROCEDURE — 85049 AUTOMATED PLATELET COUNT: CPT | Performed by: PHYSICIAN ASSISTANT

## 2017-07-19 PROCEDURE — 36415 COLL VENOUS BLD VENIPUNCTURE: CPT | Performed by: PHYSICIAN ASSISTANT

## 2017-07-19 PROCEDURE — 27210995 ZZH RX 272: Performed by: SURGERY

## 2017-07-19 PROCEDURE — 36000093 ZZH SURGERY LEVEL 4 1ST 30 MIN: Performed by: SURGERY

## 2017-07-19 PROCEDURE — 25000128 H RX IP 250 OP 636

## 2017-07-19 PROCEDURE — 82565 ASSAY OF CREATININE: CPT | Performed by: PHYSICIAN ASSISTANT

## 2017-07-19 PROCEDURE — 25000125 ZZHC RX 250: Performed by: SURGERY

## 2017-07-19 RX ORDER — DIPHENHYDRAMINE HYDROCHLORIDE 50 MG/ML
25 INJECTION INTRAMUSCULAR; INTRAVENOUS EVERY 6 HOURS PRN
Status: DISCONTINUED | OUTPATIENT
Start: 2017-07-19 | End: 2017-07-21 | Stop reason: HOSPADM

## 2017-07-19 RX ORDER — MEPERIDINE HYDROCHLORIDE 25 MG/ML
12.5 INJECTION INTRAMUSCULAR; INTRAVENOUS; SUBCUTANEOUS EVERY 5 MIN PRN
Status: DISCONTINUED | OUTPATIENT
Start: 2017-07-19 | End: 2017-07-19 | Stop reason: HOSPADM

## 2017-07-19 RX ORDER — DESOGESTREL AND ETHINYL ESTRADIOL 0.15-0.03
1 KIT ORAL DAILY
COMMUNITY
End: 2019-08-01

## 2017-07-19 RX ORDER — KETOROLAC TROMETHAMINE 30 MG/ML
30 INJECTION, SOLUTION INTRAMUSCULAR; INTRAVENOUS EVERY 6 HOURS PRN
Status: COMPLETED | OUTPATIENT
Start: 2017-07-19 | End: 2017-07-20

## 2017-07-19 RX ORDER — ALBUTEROL SULFATE 90 UG/1
2 AEROSOL, METERED RESPIRATORY (INHALATION) EVERY 6 HOURS PRN
Status: DISCONTINUED | OUTPATIENT
Start: 2017-07-19 | End: 2017-07-21 | Stop reason: HOSPADM

## 2017-07-19 RX ORDER — GLYCOPYRROLATE 0.2 MG/ML
INJECTION, SOLUTION INTRAMUSCULAR; INTRAVENOUS PRN
Status: DISCONTINUED | OUTPATIENT
Start: 2017-07-19 | End: 2017-07-19

## 2017-07-19 RX ORDER — PROPOFOL 10 MG/ML
INJECTION, EMULSION INTRAVENOUS PRN
Status: DISCONTINUED | OUTPATIENT
Start: 2017-07-19 | End: 2017-07-19

## 2017-07-19 RX ORDER — SODIUM CHLORIDE, SODIUM LACTATE, POTASSIUM CHLORIDE, CALCIUM CHLORIDE 600; 310; 30; 20 MG/100ML; MG/100ML; MG/100ML; MG/100ML
INJECTION, SOLUTION INTRAVENOUS CONTINUOUS
Status: DISCONTINUED | OUTPATIENT
Start: 2017-07-19 | End: 2017-07-21 | Stop reason: HOSPADM

## 2017-07-19 RX ORDER — HYDROMORPHONE HYDROCHLORIDE 1 MG/ML
.3-.5 INJECTION, SOLUTION INTRAMUSCULAR; INTRAVENOUS; SUBCUTANEOUS EVERY 5 MIN PRN
Status: DISCONTINUED | OUTPATIENT
Start: 2017-07-19 | End: 2017-07-19 | Stop reason: HOSPADM

## 2017-07-19 RX ORDER — ONDANSETRON 2 MG/ML
INJECTION INTRAMUSCULAR; INTRAVENOUS PRN
Status: DISCONTINUED | OUTPATIENT
Start: 2017-07-19 | End: 2017-07-19

## 2017-07-19 RX ORDER — MAGNESIUM HYDROXIDE 1200 MG/15ML
LIQUID ORAL PRN
Status: DISCONTINUED | OUTPATIENT
Start: 2017-07-19 | End: 2017-07-19 | Stop reason: HOSPADM

## 2017-07-19 RX ORDER — LIDOCAINE HYDROCHLORIDE 20 MG/ML
INJECTION, SOLUTION INFILTRATION; PERINEURAL PRN
Status: DISCONTINUED | OUTPATIENT
Start: 2017-07-19 | End: 2017-07-19

## 2017-07-19 RX ORDER — ONDANSETRON 4 MG/1
4 TABLET, ORALLY DISINTEGRATING ORAL EVERY 6 HOURS PRN
Status: DISCONTINUED | OUTPATIENT
Start: 2017-07-19 | End: 2017-07-21 | Stop reason: HOSPADM

## 2017-07-19 RX ORDER — ALBUTEROL SULFATE 90 UG/1
AEROSOL, METERED RESPIRATORY (INHALATION) PRN
Status: DISCONTINUED | OUTPATIENT
Start: 2017-07-19 | End: 2017-07-19

## 2017-07-19 RX ORDER — ONDANSETRON 2 MG/ML
4 INJECTION INTRAMUSCULAR; INTRAVENOUS EVERY 30 MIN PRN
Status: DISCONTINUED | OUTPATIENT
Start: 2017-07-19 | End: 2017-07-19 | Stop reason: HOSPADM

## 2017-07-19 RX ORDER — SCOLOPAMINE TRANSDERMAL SYSTEM 1 MG/1
1 PATCH, EXTENDED RELEASE TRANSDERMAL
Status: DISCONTINUED | OUTPATIENT
Start: 2017-07-19 | End: 2017-07-19 | Stop reason: HOSPADM

## 2017-07-19 RX ORDER — BUPIVACAINE HYDROCHLORIDE AND EPINEPHRINE 2.5; 5 MG/ML; UG/ML
INJECTION, SOLUTION INFILTRATION; PERINEURAL PRN
Status: DISCONTINUED | OUTPATIENT
Start: 2017-07-19 | End: 2017-07-19 | Stop reason: HOSPADM

## 2017-07-19 RX ORDER — CEFAZOLIN SODIUM 1 G/50ML
3 SOLUTION INTRAVENOUS
Status: COMPLETED | OUTPATIENT
Start: 2017-07-19 | End: 2017-07-19

## 2017-07-19 RX ORDER — FENTANYL CITRATE 50 UG/ML
25-50 INJECTION, SOLUTION INTRAMUSCULAR; INTRAVENOUS
Status: DISCONTINUED | OUTPATIENT
Start: 2017-07-19 | End: 2017-07-19 | Stop reason: HOSPADM

## 2017-07-19 RX ORDER — FLUTICASONE PROPIONATE 50 MCG
1 SPRAY, SUSPENSION (ML) NASAL DAILY PRN
Status: DISCONTINUED | OUTPATIENT
Start: 2017-07-19 | End: 2017-07-21 | Stop reason: HOSPADM

## 2017-07-19 RX ORDER — HEPARIN SODIUM 5000 [USP'U]/.5ML
5000 INJECTION, SOLUTION INTRAVENOUS; SUBCUTANEOUS
Status: COMPLETED | OUTPATIENT
Start: 2017-07-19 | End: 2017-07-19

## 2017-07-19 RX ORDER — NALOXONE HYDROCHLORIDE 0.4 MG/ML
.1-.4 INJECTION, SOLUTION INTRAMUSCULAR; INTRAVENOUS; SUBCUTANEOUS
Status: DISCONTINUED | OUTPATIENT
Start: 2017-07-19 | End: 2017-07-19

## 2017-07-19 RX ORDER — SODIUM CHLORIDE, SODIUM LACTATE, POTASSIUM CHLORIDE, CALCIUM CHLORIDE 600; 310; 30; 20 MG/100ML; MG/100ML; MG/100ML; MG/100ML
INJECTION, SOLUTION INTRAVENOUS CONTINUOUS
Status: DISCONTINUED | OUTPATIENT
Start: 2017-07-19 | End: 2017-07-19 | Stop reason: HOSPADM

## 2017-07-19 RX ORDER — PROCHLORPERAZINE MALEATE 5 MG
5-10 TABLET ORAL EVERY 6 HOURS PRN
Status: DISCONTINUED | OUTPATIENT
Start: 2017-07-19 | End: 2017-07-21 | Stop reason: HOSPADM

## 2017-07-19 RX ORDER — VECURONIUM BROMIDE 1 MG/ML
INJECTION, POWDER, LYOPHILIZED, FOR SOLUTION INTRAVENOUS PRN
Status: DISCONTINUED | OUTPATIENT
Start: 2017-07-19 | End: 2017-07-19

## 2017-07-19 RX ORDER — CEFAZOLIN SODIUM 2 G/100ML
2 INJECTION, SOLUTION INTRAVENOUS EVERY 8 HOURS
Status: COMPLETED | OUTPATIENT
Start: 2017-07-19 | End: 2017-07-19

## 2017-07-19 RX ORDER — ONDANSETRON 2 MG/ML
4 INJECTION INTRAMUSCULAR; INTRAVENOUS EVERY 6 HOURS PRN
Status: DISCONTINUED | OUTPATIENT
Start: 2017-07-19 | End: 2017-07-21 | Stop reason: HOSPADM

## 2017-07-19 RX ORDER — FLUTICASONE PROPIONATE 50 MCG
1 SPRAY, SUSPENSION (ML) NASAL DAILY PRN
COMMUNITY
End: 2020-12-08

## 2017-07-19 RX ORDER — FENTANYL CITRATE 50 UG/ML
INJECTION, SOLUTION INTRAMUSCULAR; INTRAVENOUS PRN
Status: DISCONTINUED | OUTPATIENT
Start: 2017-07-19 | End: 2017-07-19

## 2017-07-19 RX ORDER — DIPHENHYDRAMINE HCL 25 MG
25 CAPSULE ORAL EVERY 6 HOURS PRN
Status: DISCONTINUED | OUTPATIENT
Start: 2017-07-19 | End: 2017-07-21 | Stop reason: HOSPADM

## 2017-07-19 RX ORDER — ACETAMINOPHEN 325 MG/1
975 TABLET ORAL EVERY 8 HOURS
Status: DISCONTINUED | OUTPATIENT
Start: 2017-07-19 | End: 2017-07-20

## 2017-07-19 RX ORDER — OXYCODONE HCL 5 MG/5 ML
5-10 SOLUTION, ORAL ORAL
Status: DISCONTINUED | OUTPATIENT
Start: 2017-07-19 | End: 2017-07-21 | Stop reason: HOSPADM

## 2017-07-19 RX ORDER — CEFAZOLIN SODIUM 1 G/3ML
1 INJECTION, POWDER, FOR SOLUTION INTRAMUSCULAR; INTRAVENOUS SEE ADMIN INSTRUCTIONS
Status: DISCONTINUED | OUTPATIENT
Start: 2017-07-19 | End: 2017-07-19 | Stop reason: HOSPADM

## 2017-07-19 RX ORDER — NALOXONE HYDROCHLORIDE 0.4 MG/ML
.1-.4 INJECTION, SOLUTION INTRAMUSCULAR; INTRAVENOUS; SUBCUTANEOUS
Status: DISCONTINUED | OUTPATIENT
Start: 2017-07-19 | End: 2017-07-21 | Stop reason: HOSPADM

## 2017-07-19 RX ORDER — NEOSTIGMINE METHYLSULFATE 1 MG/ML
VIAL (ML) INJECTION PRN
Status: DISCONTINUED | OUTPATIENT
Start: 2017-07-19 | End: 2017-07-19

## 2017-07-19 RX ORDER — DEXAMETHASONE SODIUM PHOSPHATE 4 MG/ML
INJECTION, SOLUTION INTRA-ARTICULAR; INTRALESIONAL; INTRAMUSCULAR; INTRAVENOUS; SOFT TISSUE PRN
Status: DISCONTINUED | OUTPATIENT
Start: 2017-07-19 | End: 2017-07-19

## 2017-07-19 RX ORDER — ONDANSETRON 4 MG/1
4 TABLET, ORALLY DISINTEGRATING ORAL EVERY 30 MIN PRN
Status: DISCONTINUED | OUTPATIENT
Start: 2017-07-19 | End: 2017-07-19 | Stop reason: HOSPADM

## 2017-07-19 RX ORDER — LIDOCAINE 40 MG/G
CREAM TOPICAL
Status: DISCONTINUED | OUTPATIENT
Start: 2017-07-19 | End: 2017-07-21 | Stop reason: HOSPADM

## 2017-07-19 RX ORDER — EPHEDRINE SULFATE 50 MG/ML
INJECTION, SOLUTION INTRAMUSCULAR; INTRAVENOUS; SUBCUTANEOUS PRN
Status: DISCONTINUED | OUTPATIENT
Start: 2017-07-19 | End: 2017-07-19

## 2017-07-19 RX ORDER — DESOGESTREL AND ETHINYL ESTRADIOL 0.15-0.03
1 KIT ORAL DAILY
Status: DISCONTINUED | OUTPATIENT
Start: 2017-07-20 | End: 2017-07-21 | Stop reason: HOSPADM

## 2017-07-19 RX ORDER — ACETAMINOPHEN 325 MG/1
650 TABLET ORAL EVERY 4 HOURS PRN
Status: DISCONTINUED | OUTPATIENT
Start: 2017-07-22 | End: 2017-07-21 | Stop reason: HOSPADM

## 2017-07-19 RX ADMIN — Medication 10 MG: at 08:30

## 2017-07-19 RX ADMIN — HYDROMORPHONE HYDROCHLORIDE: 10 INJECTION, SOLUTION INTRAMUSCULAR; INTRAVENOUS; SUBCUTANEOUS at 10:23

## 2017-07-19 RX ADMIN — KETOROLAC TROMETHAMINE 30 MG: 30 INJECTION, SOLUTION INTRAMUSCULAR at 18:18

## 2017-07-19 RX ADMIN — ROCURONIUM BROMIDE 50 MG: 10 INJECTION INTRAVENOUS at 07:31

## 2017-07-19 RX ADMIN — SODIUM CHLORIDE, POTASSIUM CHLORIDE, SODIUM LACTATE AND CALCIUM CHLORIDE: 600; 310; 30; 20 INJECTION, SOLUTION INTRAVENOUS at 11:37

## 2017-07-19 RX ADMIN — PROPOFOL 200 MG: 10 INJECTION, EMULSION INTRAVENOUS at 07:29

## 2017-07-19 RX ADMIN — DEXAMETHASONE SODIUM PHOSPHATE 4 MG: 4 INJECTION, SOLUTION INTRA-ARTICULAR; INTRALESIONAL; INTRAMUSCULAR; INTRAVENOUS; SOFT TISSUE at 07:45

## 2017-07-19 RX ADMIN — PHENYLEPHRINE HYDROCHLORIDE 100 MCG: 10 INJECTION, SOLUTION INTRAMUSCULAR; INTRAVENOUS; SUBCUTANEOUS at 07:46

## 2017-07-19 RX ADMIN — SCOPALAMINE 1 PATCH: 1 PATCH, EXTENDED RELEASE TRANSDERMAL at 06:45

## 2017-07-19 RX ADMIN — BUPIVACAINE HYDROCHLORIDE AND EPINEPHRINE BITARTRATE 30 ML: 2.5; .005 INJECTION, SOLUTION INFILTRATION; PERINEURAL at 09:10

## 2017-07-19 RX ADMIN — ALBUTEROL SULFATE 4 PUFF: 90 AEROSOL, METERED RESPIRATORY (INHALATION) at 09:20

## 2017-07-19 RX ADMIN — PHENYLEPHRINE HYDROCHLORIDE 150 MCG: 10 INJECTION, SOLUTION INTRAMUSCULAR; INTRAVENOUS; SUBCUTANEOUS at 08:50

## 2017-07-19 RX ADMIN — CEFAZOLIN SODIUM 2 G: 2 INJECTION, SOLUTION INTRAVENOUS at 21:58

## 2017-07-19 RX ADMIN — PHENYLEPHRINE HYDROCHLORIDE 100 MCG: 10 INJECTION, SOLUTION INTRAMUSCULAR; INTRAVENOUS; SUBCUTANEOUS at 08:30

## 2017-07-19 RX ADMIN — HEPARIN SODIUM 5000 UNITS: 10000 INJECTION, SOLUTION INTRAVENOUS; SUBCUTANEOUS at 07:35

## 2017-07-19 RX ADMIN — HYDROMORPHONE HYDROCHLORIDE 0.5 MG: 1 INJECTION, SOLUTION INTRAMUSCULAR; INTRAVENOUS; SUBCUTANEOUS at 09:50

## 2017-07-19 RX ADMIN — CEFAZOLIN SODIUM 2 G: 2 INJECTION, SOLUTION INTRAVENOUS at 14:17

## 2017-07-19 RX ADMIN — HYDROMORPHONE HYDROCHLORIDE 0.5 MG: 1 INJECTION, SOLUTION INTRAMUSCULAR; INTRAVENOUS; SUBCUTANEOUS at 08:17

## 2017-07-19 RX ADMIN — FENTANYL CITRATE 50 MCG: 50 INJECTION, SOLUTION INTRAMUSCULAR; INTRAVENOUS at 08:02

## 2017-07-19 RX ADMIN — LIDOCAINE HYDROCHLORIDE 1 ML: 10 INJECTION, SOLUTION EPIDURAL; INFILTRATION; INTRACAUDAL; PERINEURAL at 06:40

## 2017-07-19 RX ADMIN — KETOROLAC TROMETHAMINE 30 MG: 30 INJECTION, SOLUTION INTRAMUSCULAR at 12:09

## 2017-07-19 RX ADMIN — MIDAZOLAM HYDROCHLORIDE 2 MG: 1 INJECTION, SOLUTION INTRAMUSCULAR; INTRAVENOUS at 07:24

## 2017-07-19 RX ADMIN — PHENYLEPHRINE HYDROCHLORIDE 150 MCG: 10 INJECTION, SOLUTION INTRAMUSCULAR; INTRAVENOUS; SUBCUTANEOUS at 08:44

## 2017-07-19 RX ADMIN — SODIUM CHLORIDE, POTASSIUM CHLORIDE, SODIUM LACTATE AND CALCIUM CHLORIDE: 600; 310; 30; 20 INJECTION, SOLUTION INTRAVENOUS at 18:20

## 2017-07-19 RX ADMIN — PHENYLEPHRINE HYDROCHLORIDE 200 MCG: 10 INJECTION, SOLUTION INTRAMUSCULAR; INTRAVENOUS; SUBCUTANEOUS at 07:58

## 2017-07-19 RX ADMIN — PHENYLEPHRINE HYDROCHLORIDE 200 MCG: 10 INJECTION, SOLUTION INTRAMUSCULAR; INTRAVENOUS; SUBCUTANEOUS at 08:28

## 2017-07-19 RX ADMIN — VECURONIUM BROMIDE 2 MG: 1 INJECTION, POWDER, LYOPHILIZED, FOR SOLUTION INTRAVENOUS at 07:59

## 2017-07-19 RX ADMIN — ONDANSETRON 4 MG: 2 INJECTION INTRAMUSCULAR; INTRAVENOUS at 09:12

## 2017-07-19 RX ADMIN — FENTANYL CITRATE 100 MCG: 50 INJECTION, SOLUTION INTRAMUSCULAR; INTRAVENOUS at 07:31

## 2017-07-19 RX ADMIN — FENTANYL CITRATE 100 MCG: 50 INJECTION, SOLUTION INTRAMUSCULAR; INTRAVENOUS at 07:28

## 2017-07-19 RX ADMIN — VECURONIUM BROMIDE 1 MG: 1 INJECTION, POWDER, LYOPHILIZED, FOR SOLUTION INTRAVENOUS at 08:50

## 2017-07-19 RX ADMIN — FENTANYL CITRATE 100 MCG: 50 INJECTION, SOLUTION INTRAMUSCULAR; INTRAVENOUS at 09:36

## 2017-07-19 RX ADMIN — PHENYLEPHRINE HYDROCHLORIDE 100 MCG: 10 INJECTION, SOLUTION INTRAMUSCULAR; INTRAVENOUS; SUBCUTANEOUS at 08:25

## 2017-07-19 RX ADMIN — PHENYLEPHRINE HYDROCHLORIDE 100 MCG: 10 INJECTION, SOLUTION INTRAMUSCULAR; INTRAVENOUS; SUBCUTANEOUS at 08:32

## 2017-07-19 RX ADMIN — HYDROMORPHONE HYDROCHLORIDE 0.5 MG: 1 INJECTION, SOLUTION INTRAMUSCULAR; INTRAVENOUS; SUBCUTANEOUS at 10:02

## 2017-07-19 RX ADMIN — PHENYLEPHRINE HYDROCHLORIDE 100 MCG: 10 INJECTION, SOLUTION INTRAMUSCULAR; INTRAVENOUS; SUBCUTANEOUS at 07:55

## 2017-07-19 RX ADMIN — PHENYLEPHRINE HYDROCHLORIDE 200 MCG: 10 INJECTION, SOLUTION INTRAMUSCULAR; INTRAVENOUS; SUBCUTANEOUS at 08:41

## 2017-07-19 RX ADMIN — VECURONIUM BROMIDE 2 MG: 1 INJECTION, POWDER, LYOPHILIZED, FOR SOLUTION INTRAVENOUS at 08:28

## 2017-07-19 RX ADMIN — Medication 3 G: at 07:34

## 2017-07-19 RX ADMIN — PHENYLEPHRINE HYDROCHLORIDE 100 MCG: 10 INJECTION, SOLUTION INTRAMUSCULAR; INTRAVENOUS; SUBCUTANEOUS at 07:50

## 2017-07-19 RX ADMIN — HYDROMORPHONE HYDROCHLORIDE 0.5 MG: 1 INJECTION, SOLUTION INTRAMUSCULAR; INTRAVENOUS; SUBCUTANEOUS at 08:41

## 2017-07-19 RX ADMIN — NEOSTIGMINE METHYLSULFATE 5 MG: 1 INJECTION INTRAMUSCULAR; INTRAVENOUS; SUBCUTANEOUS at 09:20

## 2017-07-19 RX ADMIN — PHENYLEPHRINE HYDROCHLORIDE 0.5 MCG/KG/MIN: 10 INJECTION, SOLUTION INTRAMUSCULAR; INTRAVENOUS; SUBCUTANEOUS at 08:55

## 2017-07-19 RX ADMIN — SODIUM CHLORIDE, POTASSIUM CHLORIDE, SODIUM LACTATE AND CALCIUM CHLORIDE: 600; 310; 30; 20 INJECTION, SOLUTION INTRAVENOUS at 08:10

## 2017-07-19 RX ADMIN — PHENYLEPHRINE HYDROCHLORIDE 100 MCG: 10 INJECTION, SOLUTION INTRAMUSCULAR; INTRAVENOUS; SUBCUTANEOUS at 07:39

## 2017-07-19 RX ADMIN — SODIUM CHLORIDE, POTASSIUM CHLORIDE, SODIUM LACTATE AND CALCIUM CHLORIDE: 600; 310; 30; 20 INJECTION, SOLUTION INTRAVENOUS at 09:12

## 2017-07-19 RX ADMIN — SODIUM CHLORIDE 1000 ML: 0.9 IRRIGANT IRRIGATION at 07:59

## 2017-07-19 RX ADMIN — LIDOCAINE HYDROCHLORIDE 100 MG: 20 INJECTION, SOLUTION INFILTRATION; PERINEURAL at 07:29

## 2017-07-19 RX ADMIN — SODIUM CHLORIDE, POTASSIUM CHLORIDE, SODIUM LACTATE AND CALCIUM CHLORIDE: 600; 310; 30; 20 INJECTION, SOLUTION INTRAVENOUS at 06:40

## 2017-07-19 RX ADMIN — HYDROMORPHONE HYDROCHLORIDE 0.5 MG: 1 INJECTION, SOLUTION INTRAMUSCULAR; INTRAVENOUS; SUBCUTANEOUS at 10:45

## 2017-07-19 RX ADMIN — SODIUM CHLORIDE 1000 ML: 900 IRRIGANT IRRIGATION at 07:59

## 2017-07-19 RX ADMIN — PROPOFOL 50 MG: 10 INJECTION, EMULSION INTRAVENOUS at 07:31

## 2017-07-19 RX ADMIN — DEXMEDETOMIDINE HYDROCHLORIDE 0.5 MCG/KG/HR: 100 INJECTION, SOLUTION INTRAVENOUS at 07:26

## 2017-07-19 RX ADMIN — GLYCOPYRROLATE 0.8 MG: 0.2 INJECTION, SOLUTION INTRAMUSCULAR; INTRAVENOUS at 09:20

## 2017-07-19 ASSESSMENT — ENCOUNTER SYMPTOMS
SEIZURES: 0
ORTHOPNEA: 0

## 2017-07-19 NOTE — ANESTHESIA CARE TRANSFER NOTE
Patient: Shobha Rader    Procedure(s):  LAPAROSCOPIC DAYSI-EN-Y GASTRIC BYPASS - Wound Class: II-Clean Contaminated    Diagnosis: MORBID OBESITY  Diagnosis Additional Information: No value filed.    Anesthesia Type:   General     Note:  Airway :Face Mask  Patient transferred to:PACU  Comments: Pt to PACU, spont resp, alert/awake, following commands, vss. Report to RN      Vitals: (Last set prior to Anesthesia Care Transfer)    CRNA VITALS  7/19/2017 0901 - 7/19/2017 0941      7/19/2017             EKG: Sinus rhythm                Electronically Signed By: GODWIN Chavira CRNA  July 19, 2017  9:41 AM

## 2017-07-19 NOTE — PROGRESS NOTES
Admission medication history interview status for the 7/19/2017  admission is complete. See EPIC admission navigator for prior to admission medications     Medication history source reliability:Good    Medication history interview source(s):Patient    Medication history resources (including written lists, pill bottles, clinic record):None    Primary pharmacy.[facility]    Additional medication history information not noted on PTA med list :None    Time spent in this activity: 45 minutes    Prior to Admission medications    Medication Sig Last Dose Taking? Auth Provider   CALCIUM-VITAMIN D PO Take 1,500 mg by mouth daily 7/15/2017 Yes Reported, Patient   desogestrel-ethinyl estradiol (ENSKYCE) 0.15-30 MG-MCG per tablet Take 1 tablet by mouth daily 7/18/2017 at pm Yes Reported, Patient   Pediatric Multivit-Minerals-C (FLINTSTONES COMPLETE PO) Take 1-1.5 tablets by mouth daily 7/15/2017 Yes Reported, Patient   fluticasone (FLONASE) 50 MCG/ACT spray Spray 1 spray into both nostrils daily as needed for rhinitis or allergies more than a week at prn Yes Reported, Patient   VITAMIN D, CHOLECALCIFEROL, PO Take 2,000 Units by mouth daily  7/15/2017 Yes Reported, Patient   Acetaminophen (TYLENOL PO) Take 325-650 mg by mouth every 6 hours as needed  Past Week at prn Yes Reported, Patient   albuterol (PROAIR HFA/PROVENTIL HFA/VENTOLIN HFA) 108 (90 BASE) MCG/ACT Inhaler Inhale 2 puffs into the lungs every 6 hours as needed  more than a week at prn Yes Reported, Patient

## 2017-07-19 NOTE — OP NOTE
PREOPERATIVE DIAGNOSIS: Morbid obesity with  medical comorbidities.   POSTOPERATIVE DIAGNOSIS: Morbid obesity with  medical comorbidities.   PROCEDURE: Laparoscopic Taz-en-Y gastric bypass.   SURGEON: Austin Melara MD   ASSISTANT: Tonny Nunez PA-C, Physician's assistant first assistant was necessary during the performance of this procedure for expertise in patient positioning, prepping, draping, trocar placement, camera management, retraction and exposure, and suctioning.  ANESTHESIA: General endotracheal.   ESTIMATED BLOOD LOSS: 10 cc.   DRAINS: None.   COMPLICATIONS: None.   SPECIMENS: None.   OPERATIVE INDICATIONS: Shobha Rader has undergone the preoperative screening process through the [facility] and has been deemed an appropriate candidate for bariatric surgery. She  was furnished with a copy of our specialized consent form for said procedure. This document was reviewed with her  in great detail. After thoroughly discussing the procedures, potential risks and anticipated outcome she  wished to proceed.   DESCRIPTION OF PROCEDURE: After informed consent was obtained, the patient was taken to the operating room and placed supine on the operating table. Following the induction of adequate general endotracheal anesthesia, a Berger catheter was placed and the patient's abdomen was shaved, prepped and draped in the usual fashion. A surgical timeout was initiated and completed. Appropriate IV antibiotics as well as subq heparin were administered. Skin incision was then made to the left of the umbilicus in the mid abdomen and a 12 mm optical trocar was used to gain access to the peritoneal cavity. Carbon dioxide pneumoperitoneum was then established. Under direct vision, the following ports were then placed: a 12 mm port was placed in the left anterior axillary line, 5 mm port was placed in the left subcostal position, a 12 mm port was placed in the right subcostal position and  finally, a 5 mm port was placed to the left of midline in the upper abdomen. The greater omentum was then divided from its distal tip to the level of the transverse colon. The small bowel was run proximally to the ligament of Treitz. 50 cm distal to ligament of Treitz, the small bowel was divided using an endoscopic linear stapler, 60 mm blue load prepped with Fiona-Strips Dry. 125 cm bypass Taz limb was measured out and a side-to-side jejunojejunostomy was created using the 60 mm blue load stapler prepped with Fiona-Strips Dry. Enterotomies were oversewn using running 2-0 silk suture. The mesenteric defect was closed using running 2-0 silk suture. The patient was placed in reverse Trendelenburg and under direct vision, a Chelle liver retractor was introduced through a subxiphoid stab incision used to elevate the left lobe of the liver. The angle of His was taken down and dissection was carried out along the lesser curvature of the stomach, gaining access to her access to the retrogastric space. A gastrotomy was then created and the anvil portion of the 25 EEA stapler was introduced into the abdomen and placed in the stomach. This was deployed through the anterior gastric wall in appropriate location and the gastrotomy was closed using interrupted 2-0 silk sutures. A completely divided gastric pouch was then created around the anvil using several firings of the 60 mm blue load stapler prepped with Fiona-Strips Dry. A completely divided pouch was assured. The bypass Taz limb was brought up in an antecolic antegastric fashion. The stapled portion was opened and the firing portion of the 25 EEA stapler was advanced into the bowel. This was deployed through the antemesenteric border of the bowel. The anvil was attached to the stapler and the stapler was closed and fired without complication. Stapler was then removed and the Taz limb was reclosed using a 60 mm blue load stapler prepped with Fiona-Strips Dry. The GJ  anastomosis was oversewn anteriorly using interrupted 2-0 Vicryl suture. A bubble test was performed and this was negative. The gallbladder was examined and appeared normal. The fascia at the 12 mm port in the left anterior axillary line was closed using a fascial closure instrument and 0 Vicryl tie. Trocars as well as Chelle retractor were all then removed. Carbon dioxide was massaged from the abdomen. Local anesthetic was injected at the incision sites and the incisions were all closed with subcuticular 4-0 Vicryl stitches. Benzoin and Steri-Strips were applied. Needle and sponge counts were correct. The patient tolerated this without difficulty, was awakened in the operating room and taken to the recovery room in stable condition.   SAMM CARL MD

## 2017-07-19 NOTE — IP AVS SNAPSHOT
[facility] [facility]    [address] [place] [address]    Phone:  [phone]                                       After Visit Summary   7/19/2017    Shobha Rader    MRN: [id number]           After Visit Summary Signature Page     I have received my discharge instructions, and my questions have been answered. I have discussed any challenges I see with this plan with the nurse or doctor.    ..........................................................................................................................................  Patient/Patient Representative Signature      ..........................................................................................................................................  Patient Representative Print Name and Relationship to Patient    ..................................................               ................................................  Date                                            Time    ..........................................................................................................................................  Reviewed by Signature/Title    ...................................................              ..............................................  Date                                                            Time

## 2017-07-19 NOTE — ANESTHESIA POSTPROCEDURE EVALUATION
Patient: Shobha Rader    Procedure(s):  LAPAROSCOPIC DAYSI-EN-Y GASTRIC BYPASS - Wound Class: II-Clean Contaminated    Diagnosis:MORBID OBESITY  Diagnosis Additional Information: No value filed.    Anesthesia Type:  General    Note:  Anesthesia Post Evaluation    Patient location during evaluation: PACU  Patient participation: Able to fully participate in evaluation  Level of consciousness: awake  Pain management: adequate  Airway patency: patent  Cardiovascular status: acceptable  Respiratory status: acceptable  Hydration status: acceptable  PONV: none     Anesthetic complications: None          Last vitals:  Vitals:    07/19/17 1330 07/19/17 1430 07/19/17 1530   BP: (!) 145/92 (!) 154/94 156/85   Resp: 16 16 16   Temp:      SpO2: 96% 97% 96%         Electronically Signed By: Donte Schreiber MD  July 19, 2017  3:54 PM

## 2017-07-19 NOTE — BRIEF OP NOTE
[facility] Brief Operative Note    Pre-operative diagnosis: MORBID OBESITY   Post-operative diagnosis Morbid Obesity     Procedure: Procedure(s):  LAPAROSCOPIC DAYSI-EN-Y GASTRIC BYPASS - Wound Class: II-Clean Contaminated   Surgeon(s): Surgeon(s) and Role:     * Austin Melara MD - Primary     * Tonny Nunez PA-C - Assisting   Estimated blood loss: 20 mL    Specimens: * No specimens in log *   Findings: 25 EEA stapler used  Gallbladder WNL  See Operative Report for full details

## 2017-07-19 NOTE — PHARMACY-CONSULT NOTE
Bariatric Consult    Medications evaluated as requested per Bariatric Consult.     No medication changes were needed based on the Bariatric Medication Management Policy.    The pharmacist will continue to follow as new medications are ordered.    Mikaela Amanda, JenniferD, BCPS

## 2017-07-19 NOTE — PLAN OF CARE
Problem: Goal Outcome Summary  Goal: Goal Outcome Summary  Outcome: No Change  Up from PACU at 1130, vs stable, denies nausea, pain controlled with pca and toradol, hooks with good urine output, lap sites intact, IS instructed and encouraged.

## 2017-07-19 NOTE — ANESTHESIA PREPROCEDURE EVALUATION
"Procedure: Procedure(s):  COMBINED LAPAROSCOPIC BYPASS GASTRIC, CHOLECYSTECTOMY  Preop diagnosis: MORBID OBESITY  Allergies   Allergen Reactions     Metformin Other (See Comments)     Possible bleeding     Lanolin Rash     Vicodin [Hydrocodone-Acetaminophen] Itching and Rash     Patient Active Problem List   Diagnosis     Indication for care in labor or delivery      contractions      labor      (normal spontaneous vaginal delivery)     Obesity, Class III, BMI 40-49.9 (morbid obesity) (H)     TMJ dysfunction     Intertrigo     SAEID (obstructive sleep apnea)     Past Medical History:   Diagnosis Date     Anxiety      Asthma     reactive airway disease, exercised induced     SAEID on CPAP      Post partum depression       labor     with current (second) pregnancy     Past Surgical History:   Procedure Laterality Date     MASTECTOMY PARTIAL      under R armpit     wisdom teeth         Current Facility-Administered Medications on File Prior to Encounter:  ROPivacaine (NAROPIN) epidural     Current Outpatient Prescriptions on File Prior to Encounter:  VITAMIN D, CHOLECALCIFEROL, PO Take 2,000 Units by mouth daily    Acetaminophen (TYLENOL PO) Take 325-650 mg by mouth every 6 hours as needed    albuterol (PROAIR HFA/PROVENTIL HFA/VENTOLIN HFA) 108 (90 BASE) MCG/ACT Inhaler Inhale 2 puffs into the lungs every 6 hours as needed      /86  Temp 36.3  C (97.4  F) (Temporal)  Resp 18  Ht 1.74 m (5' 8.5\")  Wt (!) 137 kg (302 lb)  LMP 2017 (Exact Date)  SpO2 97%  BMI 45.25 kg/m2    Lab Results   Component Value Date    WBC 10.8 2017     Lab Results   Component Value Date    RBC 4.76 2017     Lab Results   Component Value Date    HGB 13.9 2017     Lab Results   Component Value Date    HCT 39.8 2017     Lab Results   Component Value Date    MCV 84 2017     Lab Results   Component Value Date    MCH 29.2 2017     Lab Results   Component Value Date    " MCHC 34.9 01/11/2017     Lab Results   Component Value Date    RDW 12.3 01/11/2017     Lab Results   Component Value Date     01/11/2017     No results found for: INR    Last Basic Metabolic Panel:  Lab Results   Component Value Date     01/11/2017      Lab Results   Component Value Date    POTASSIUM 4.0 01/11/2017     Lab Results   Component Value Date    CHLORIDE 108 01/11/2017     Lab Results   Component Value Date    ROSA 8.8 01/11/2017     Lab Results   Component Value Date    CO2 22 01/11/2017     Lab Results   Component Value Date    BUN 14 01/11/2017     Lab Results   Component Value Date    CR 0.73 01/11/2017     Lab Results   Component Value Date    GLC 83 01/11/2017     K 4.0  7/13/2017  HGB 13.9  7/13/2017  Anesthesia Evaluation     . Pt has had prior anesthetic.     History of anesthetic complications   - motion sickness        ROS/MED HX    ENT/Pulmonary:     (+)sleep apnea, asthma (exercise induced) uses CPAP , recent URI unresolved nasal congestion/ throat tickle: . .    Neurologic:      (-) seizures, CVA and migraines   Cardiovascular:  - neg cardiovascular ROS      (-) CHF and orthopnea/PND   METS/Exercise Tolerance:     Hematologic:  - neg hematologic  ROS       Musculoskeletal: Comment: TMJ dysfunction        GI/Hepatic:  - neg GI/hepatic ROS      (-) GERD   Renal/Genitourinary:         Endo:     (+) Obesity, .   (-) Type II DM and thyroid disease   Psychiatric:     (+) psychiatric history (post partum depression) anxiety and depression      Infectious Disease:  - neg infectious disease ROS       Malignancy:      - no malignancy   Other:                     Physical Exam  Normal systems: cardiovascular, pulmonary and dental    Airway   Mallampati: II  TM distance: >3 FB  Neck ROM: full    Dental     Cardiovascular   Rhythm and rate: regular and normal      Pulmonary    breath sounds clear to auscultation                    Anesthesia Plan      History & Physical Review  History and  physical reviewed and following examination; no interval change.    ASA Status:  2 .    NPO Status:  > 8 hours    Plan for General with Propofol induction.   PONV prophylaxis:  Ondansetron (or other 5HT-3), Dexamethasone or Solumedrol and Scopolamine patch  Additional equipment: Videolaryngoscope Patient to use inhaler before going back to operating room  precedex infusion      Postoperative Care  Postoperative pain management:  IV analgesics.      Consents  Anesthetic plan, risks, benefits and alternatives discussed with:  Patient..                          .

## 2017-07-19 NOTE — IP AVS SNAPSHOT
MRN:[id number]                      After Visit Summary   7/19/2017    Shobha Rader    MRN: [id number]           Thank you!     Thank you for choosing [facility] for your care. Our goal is always to provide you with excellent care. Hearing back from our patients is one way we can continue to improve our services. Please take a few minutes to complete the written survey that you may receive in the mail after you visit with us. Thank you!        Patient Information     Date Of Birth          1985        Designated Caregiver       Most Recent Value    Caregiver    Will someone help with your care after discharge? yes    Name of designated caregiver David - Spouse    Phone number of caregiver [phone] - cell    Caregiver address same as pt      About your hospital stay     You were admitted on:  July 19, 2017 You last received care in the:  [facility] [address] [facility]    You were discharged on:  July 21, 2017        Reason for your hospital stay       Bariatric Surgery                  Who to Call     For medical emergencies, please call 911.  For non-urgent questions about your medical care, please call your primary care provider or clinic, [phone]  For questions related to your surgery, please call your surgery clinic        Attending Provider     Provider Specialty    Austin Melara MD General Surgery       Primary Care Provider Office Phone # Fax #    Guerrero Benavides [phone]       When to contact your care team       Call [facility] if you have any of the following: fever, dehydration, or increased pain that lasts longer than 2 hours and not improved with rest.                  After Care Instructions     Activity       Your activity upon discharge: activity as tolerated, ambulate in house, no driving while on analgesics and no heavy lifting for 2-3 weeks            Diet       Follow this diet upon discharge: Full liquid.  If  full liquids are not tolerated, go back to Clear liquids for 24-48 hours and then try Full liquids again.            Discharge Instructions       Use your incentive spirometer every 2-3 hours at home for there first few days.  Think of this as PT for your lungs.  Until your abdominal pain is gone, your lungs will try to resist being used at their full capacity.  The spirometer helps with this and helps you avoid a pneumonia.            Wound care and dressings       Instructions to care for your wound at home: may get incision wet in shower but do not soak or scrub.                  Follow-up Appointments     Follow-up and recommended labs and tests       Follow up with [facility] within 1 week  to evaluate after surgery. No follow up labs or test are needed.                  Your next 10 appointments already scheduled     Jul 25, 2017  2:00 PM CDT   Post Op with Flory Meek Rn, RN   [facility] ([facility])    [address] [place] [address]   [phone]            Jul 25, 2017  2:30 PM CDT   Post Op with Abigail Salinas PA-C   [facility] ([facility])    [address] [place] [address]   [phone]            Aug 02, 2017  9:00 AM CDT   Post Op with Flory Meek Rn, RN   [facility] ([facility])    [address] [place] [address]   [phone]            Aug 02, 2017  9:30 AM CDT   Post Op with Flory Meek Diet 3, RD   [facility] ([facility])    [address] [place] [address] [place] [address]   [phone]            Aug 21, 2017  8:30 AM CDT   Post Op with Flory Meek Diet 1, RD   [facility] ([facility])    [address] [place] [address]  [address]  [place] [address] [phone]              Further instructions from your care team            For informational purposes only. Not to replace the advice of your health care provider. Copyright   2006 [employer] [facility]. All rights reserved. [employer] [id number] - REV 09/14  After Bariatric Surgery  [facility]  Note: Before you go home, ask your nurse to order your pain medicine from the pharmacy. Be sure you have your medicine with you when you leave.  How much fluid should I drink?    Drink at least 1 ounce of fluid every 15 minutes (1/2 cup per hour) during the day.     Carry a water bottle with you. Drink from it often.    Keep track of how much fluid you drink in a day.    Adjustable stomach band: Do not overeat or drink too much. This can cause vomiting (throwing up), stretch your stomach, or make your stomach slip up over your band.    Remember:  - Do not use straws, chew gum or suck on hard candies. They may cause painful gas.   - No cold drinks.  - No coffee, soda pop or drinks with caffeine. These may cause stomach pain.  - No alcohol. It is bad for your liver and will cause stomach pain. It also adds a lot of calories.  What can I do for pain control?      You had major abdominal surgery that involves all layers of your abdominal muscles. Pain is expected, even as far out as 6-8 weeks postop.  Moving, sneezing, coughing, and  breathing will cause pain because these activities use your abdominal muscles.      You can take the liquid pain medicine as prescribed. You can also try to wean off from it  as soon as you feel comfortable.  Do not drive while you are taking pain medicine.   This is dangerous.     You may take liquid Tylenol (acetaminophen) for pain in place of the prescribed pain  medicine. Do not take more than 3000 mg in a 24 hour period.     You may also apply ice or heat to the affected area(s).  Just remember to wrap the ice  in something and limit icing  sessions to 20 minutes. Excessive icing can irritate the skin  or cause tissue damage.   You can apply heat with a hot, wet towel or heating pad. Just like cold therapy, limit  heat application to 20 minutes. Never sleep with a heating pad on. It could cause severe  burns to your skin.    Do not take NSAID s (ibuprofen, Motrin, Advil, Aleve, Naproxen). They will increase you risk for bleeding or getting an ulcer.    If you have any of the following pain issues, we would like to talk to you:  - pain that does not improve with rest  - pain that gets worse and worse  - pain that is not controlled by your pain medicine  - a sudden severe increase in pain  -   If your doctor prescribes Zantac, take it as ordered. Take it for 3 months to prevent       Ulcers.  -   If you took an antacid before you had surgery, keep taking it for 3 months after           surgery. This will help prevent ulcers.   - Take any other medicines that your doctor tells you to take.   - Wear your binder to support your belly muscles.  Please call the clinic at [phone] to discuss prior to going to ED.     How much rest do I need?  Get plenty of rest the first few days after surgery. Balance rest and activity.  Do not nap more than one hour during the day. Set a timer to wake yourself up, if needed. Too much sleep will keep you from drinking enough fluid during the day.  What should I know about my incisions (cuts)?  - Change your bandages as needed or if they get wet.   -Call your doctor if you have any of these signs of infection:   - Redness around the site.   - Drainage that smells bad.   - Fever of 101  F (38.3  C) or higher when taken under the tongue.- Chills.  If you     have a drain:  - Do not pull on the drain. Do not pull the drain out. We will take out your drain at your first clinic visit.  - The color and amount of fluid varies. Right after surgery the fluid is bright red. Over time, it changes to light pink and may become clear or  the color of straw.   Will my urine or bowel movements change?   You might not have a bowel movement for several days after surgery. Your first bowel movements will likely be liquid.   Gastric bypass or sleeve gastrectomy: You may also notice old blood or a darker color in your stools (bowel movements).   Your urine should be clear to light yellow. This shows that you are drinking enough fluid. You should urinate (pass water) at least 2 to 3 times during the day. If not, call us.  What kind of activity is safe?  For 4 weeks after surgery:     Walk for a short time every day.    Do not jog or run.    No weight lifting or belly exercises.  No swimming, baths or hot tubs until your cuts are healed (scabs are gone). You may shower.  No outside activity in hot, humid weather until you can drink 48 to 64 ounces of fluid in 24 hours. If you sweat a lot, your body may lose too much water.   The first month after surgery, do not take any trips where you must sit for a long time. You could get a blood clot in your legs.    Call the clinic at [phone] if:    Your pain medicine is not working.    You do not urinate (pass water) 2 to 3 times per day.    You have any signs of infection.    You have belly pain that gets worse and worse.    You have swollen legs with pain behind the knee or calf.    You have chest pain or feel very short of breath.    You have any questions or concerns.    You have a sudden severe increase in heart rate.    You have vomiting that gets worse and worse.  When should I go back to the clinic?  Time Gastric bypass or sleeve gastrectomy Adjustable gastric band   Week 1 See the physician or physician assistant (PA). See the nurse and physical therapist.   Week 2 See the nurse and dietitian. See the nurse and dietitian.   Week 4 Have a nutrition consult with the dietitian. See the PA and dietitian.   Week 6  Go for the first adjustment (fill) of your stomach band.   For the first year (or until your  "BMI is less than 30) Go to the clinic each month to see if you need a band adjustment (fill).         Pending Results     No orders found from 7/17/2017 to 7/20/2017.            Statement of Approval     Ordered          07/21/17 1004  I have reviewed and agree with all the recommendations and orders detailed in this document.  EFFECTIVE NOW     Approved and electronically signed by:  Tonny Nunez PA-C             Admission Information     Date & Time Provider Department Dept. Phone    7/19/2017 Austin Melara MD [place] [address] [facility] [phone]      Your Vitals Were     Blood Pressure Pulse Temperature Respirations Height Weight    114/60 (BP Location: Left arm) 86 98.1  F (36.7  C) (Oral) 18 1.74 m (5' 8.5\") 137 kg (302 lb)    Last Period Pulse Oximetry BMI (Body Mass Index)             06/03/2017 (Exact Date) 96% 45.25 kg/m2         MyChart Information     [facility] gives you secure access to your electronic health record. If you see a primary care provider, you can also send messages to your care team and make appointments. If you have questions, please call your primary care clinic.  If you do not have a primary care provider, please call [phone] and they will assist you.        Care EveryWhere ID     This is your Care EveryWhere ID. This could be used by other organizations to access your [facility] medical records  [id number]        Equal Access to Services     [place] AH: Hadii holger garrisono Soayah, waaxda luqadaha, qaybta kaalmada adeerlindada, daniel maradiaga . So [place] [phone].    ATENCIÓN: Si habla español, tiene a lassiter disposición servicios gratuitos de asistencia lingüística. Llame al [phone].    We comply with applicable federal civil rights laws and [place] laws. We do not discriminate on the basis of race, color, national origin, age, disability sex, sexual orientation or gender identity.               Review of your " medicines      START taking        Dose / Directions    ondansetron 4 MG ODT tab   Commonly known as:  ZOFRAN-ODT   Used for:  Bariatric surgery status, PONV (postoperative nausea and vomiting)        Dose:  4 mg   Take 1 tablet (4 mg) by mouth every 6 hours as needed for nausea or vomiting   Quantity:  20 tablet   Refills:  0       oxyCODONE 5 MG/5ML solution   Commonly known as:  ROXICODONE   Used for:  Bariatric surgery status, Acute post-operative pain        Dose:  5-10 mg   Take 5-10 mLs (5-10 mg) by mouth every 3 hours as needed for moderate to severe pain   Quantity:  250 mL   Refills:  0       ranitidine 75 MG tablet   Commonly known as:  ZANTAC   Used for:  Bariatric surgery status        Dose:  75 mg   Take 1 tablet (75 mg) by mouth 2 times daily   Quantity:  60 tablet   Refills:  2         CONTINUE these medicines which have NOT CHANGED        Dose / Directions    albuterol 108 (90 BASE) MCG/ACT Inhaler   Commonly known as:  PROAIR HFA/PROVENTIL HFA/VENTOLIN HFA        Dose:  2 puff   Inhale 2 puffs into the lungs every 6 hours as needed   Refills:  0       CALCIUM-VITAMIN D PO        Dose:  1500 mg   Take 1,500 mg by mouth daily   Refills:  0       ENSKYCE 0.15-30 MG-MCG per tablet   Generic drug:  desogestrel-ethinyl estradiol        Dose:  1 tablet   Take 1 tablet by mouth daily   Refills:  0       FLINTSTONES COMPLETE PO        Dose:  1-1.5 tablet   Take 1-1.5 tablets by mouth daily   Refills:  0       fluticasone 50 MCG/ACT spray   Commonly known as:  FLONASE        Dose:  1 spray   Spray 1 spray into both nostrils daily as needed for rhinitis or allergies   Refills:  0       TYLENOL PO        Dose:  325-650 mg   Take 325-650 mg by mouth every 6 hours as needed   Refills:  0       VITAMIN D (CHOLECALCIFEROL) PO        Dose:  2000 Units   Take 2,000 Units by mouth daily   Refills:  0            Where to get your medicines      These medications were sent to [facility] - [address]  [address] [place] [address]     Phone:  [phone]     ondansetron 4 MG ODT tab    ranitidine 75 MG tablet         Some of these will need a paper prescription and others can be bought over the counter. Ask your nurse if you have questions.     Bring a paper prescription for each of these medications     oxyCODONE 5 MG/5ML solution                Protect others around you: Learn how to safely use, store and throw away your medicines at www.disposemymeds.org.             Medication List: This is a list of all your medications and when to take them. Check marks below indicate your daily home schedule. Keep this list as a reference.      Medications           Morning Afternoon Evening Bedtime As Needed    albuterol 108 (90 BASE) MCG/ACT Inhaler   Commonly known as:  PROAIR HFA/PROVENTIL HFA/VENTOLIN HFA   Inhale 2 puffs into the lungs every 6 hours as needed   Last time this was given:  2 puffs on 7/20/2017  3:32 PM                                   CALCIUM-VITAMIN D PO   Take 1,500 mg by mouth daily                                   ENSKYCE 0.15-30 MG-MCG per tablet   Take 1 tablet by mouth daily   Generic drug:  desogestrel-ethinyl estradiol                                   FLINTSTONES COMPLETE PO   Take 1-1.5 tablets by mouth daily                                   fluticasone 50 MCG/ACT spray   Commonly known as:  FLONASE   Spray 1 spray into both nostrils daily as needed for rhinitis or allergies                                   ondansetron 4 MG ODT tab   Commonly known as:  ZOFRAN-ODT   Take 1 tablet (4 mg) by mouth every 6 hours as needed for nausea or vomiting                                   oxyCODONE 5 MG/5ML solution   Commonly known as:  ROXICODONE   Take 5-10 mLs (5-10 mg) by mouth every 3 hours as needed for moderate to severe pain   Last time this was given:  10 mg on 7/21/2017  9:53 AM   Next Dose Due:  Anytime after 1:00 PM                                    ranitidine 75 MG tablet   Commonly known as:  ZANTAC   Take 1 tablet (75 mg) by mouth 2 times daily   Last time this was given:  75 mg on 7/21/2017  8:26 AM                                      TYLENOL PO   Take 325-650 mg by mouth every 6 hours as needed                                   VITAMIN D (CHOLECALCIFEROL) PO   Take 2,000 Units by mouth daily

## 2017-07-20 ENCOUNTER — APPOINTMENT (OUTPATIENT)
Dept: GENERAL RADIOLOGY | Facility: CLINIC | Age: 32
DRG: 621 | End: 2017-07-20
Attending: PHYSICIAN ASSISTANT
Payer: COMMERCIAL

## 2017-07-20 LAB
ANION GAP SERPL CALCULATED.3IONS-SCNC: 7 MMOL/L (ref 3–14)
CHLORIDE SERPL-SCNC: 107 MMOL/L (ref 94–109)
CO2 SERPL-SCNC: 25 MMOL/L (ref 20–32)
GLUCOSE SERPL-MCNC: 120 MG/DL (ref 70–99)
HGB BLD-MCNC: 11 G/DL (ref 11.7–15.7)
POTASSIUM SERPL-SCNC: 3.7 MMOL/L (ref 3.4–5.3)
SODIUM SERPL-SCNC: 139 MMOL/L (ref 133–144)

## 2017-07-20 PROCEDURE — 80051 ELECTROLYTE PANEL: CPT | Performed by: PHYSICIAN ASSISTANT

## 2017-07-20 PROCEDURE — 25000128 H RX IP 250 OP 636: Performed by: SURGERY

## 2017-07-20 PROCEDURE — 25000128 H RX IP 250 OP 636: Performed by: PHYSICIAN ASSISTANT

## 2017-07-20 PROCEDURE — 12000007 ZZH R&B INTERMEDIATE

## 2017-07-20 PROCEDURE — 82947 ASSAY GLUCOSE BLOOD QUANT: CPT | Performed by: PHYSICIAN ASSISTANT

## 2017-07-20 PROCEDURE — 40000986 XR UPPER GI WATER SOLUBLE

## 2017-07-20 PROCEDURE — 25000132 ZZH RX MED GY IP 250 OP 250 PS 637: Performed by: PHYSICIAN ASSISTANT

## 2017-07-20 PROCEDURE — 36415 COLL VENOUS BLD VENIPUNCTURE: CPT | Performed by: PHYSICIAN ASSISTANT

## 2017-07-20 PROCEDURE — 85018 HEMOGLOBIN: CPT | Performed by: PHYSICIAN ASSISTANT

## 2017-07-20 PROCEDURE — 25000132 ZZH RX MED GY IP 250 OP 250 PS 637: Performed by: SURGERY

## 2017-07-20 RX ADMIN — KETOROLAC TROMETHAMINE 30 MG: 30 INJECTION, SOLUTION INTRAMUSCULAR at 06:10

## 2017-07-20 RX ADMIN — OXYCODONE HYDROCHLORIDE 10 MG: 5 SOLUTION ORAL at 16:12

## 2017-07-20 RX ADMIN — OXYCODONE HYDROCHLORIDE 10 MG: 5 SOLUTION ORAL at 13:09

## 2017-07-20 RX ADMIN — DIATRIZOATE MEGLUMINE AND DIATRIZOATE SODIUM 30 ML: 660; 100 SOLUTION ORAL; RECTAL at 08:36

## 2017-07-20 RX ADMIN — SODIUM CHLORIDE, POTASSIUM CHLORIDE, SODIUM LACTATE AND CALCIUM CHLORIDE: 600; 310; 30; 20 INJECTION, SOLUTION INTRAVENOUS at 06:51

## 2017-07-20 RX ADMIN — ENOXAPARIN SODIUM 40 MG: 40 INJECTION SUBCUTANEOUS at 09:15

## 2017-07-20 RX ADMIN — SODIUM CHLORIDE, POTASSIUM CHLORIDE, SODIUM LACTATE AND CALCIUM CHLORIDE: 600; 310; 30; 20 INJECTION, SOLUTION INTRAVENOUS at 13:29

## 2017-07-20 RX ADMIN — ENOXAPARIN SODIUM 40 MG: 40 INJECTION SUBCUTANEOUS at 20:48

## 2017-07-20 RX ADMIN — KETOROLAC TROMETHAMINE 30 MG: 30 INJECTION, SOLUTION INTRAMUSCULAR at 13:29

## 2017-07-20 RX ADMIN — KETOROLAC TROMETHAMINE 30 MG: 30 INJECTION, SOLUTION INTRAMUSCULAR at 22:17

## 2017-07-20 RX ADMIN — SODIUM CHLORIDE, POTASSIUM CHLORIDE, SODIUM LACTATE AND CALCIUM CHLORIDE: 600; 310; 30; 20 INJECTION, SOLUTION INTRAVENOUS at 00:25

## 2017-07-20 RX ADMIN — OXYCODONE HYDROCHLORIDE 10 MG: 5 SOLUTION ORAL at 22:16

## 2017-07-20 RX ADMIN — OXYCODONE HYDROCHLORIDE 10 MG: 5 SOLUTION ORAL at 19:17

## 2017-07-20 RX ADMIN — ACETAMINOPHEN 975 MG: 325 SOLUTION ORAL at 18:06

## 2017-07-20 RX ADMIN — ALBUTEROL SULFATE 2 PUFF: 90 AEROSOL, METERED RESPIRATORY (INHALATION) at 15:32

## 2017-07-20 RX ADMIN — KETOROLAC TROMETHAMINE 30 MG: 30 INJECTION, SOLUTION INTRAMUSCULAR at 00:10

## 2017-07-20 RX ADMIN — SODIUM CHLORIDE, POTASSIUM CHLORIDE, SODIUM LACTATE AND CALCIUM CHLORIDE: 600; 310; 30; 20 INJECTION, SOLUTION INTRAVENOUS at 19:17

## 2017-07-20 NOTE — CONSULTS
"NUTRITION EDUCATION    REASON FOR ASSESSMENT:  Bariatric Surgery Consult    CURRENT DIET:  Bariatric Clear Liquids    NUTRITION HISTORY:  Patient worked with clinical dietitian in [facility] prior to surgery to assist with lifestyle modification.    ANTHROPOMETRICS:   Ht: 5'8.5\"  Wt: 137 kg  BMI: 45.25 kg/m2  IBW: 64.8 kg  %IBW: 211%    ASSESSED NUTRITION NEEDS:  Energy Needs: 9668-5240 kcals/day (11 - 14 kcal/kg ABW)                      Protein Needs: 65-97 g/day (1 - 1.5 gm/kg IBW)  Fluid Needs: 9897-6035 mL/day (1mL/kcal/ABW)    Know patient will not meet assessed needs due to the restrictive nature of surgery.    NUTRITION DIAGNOSIS:   Food- and nutrition related knowledge deficit related to bariatric surgery as evidence by Laparoscopic Gastric Bypass surgery on 7/19/2017.    INTERVENTIONS:    Nutrition Prescription:    Recommended patient follow bariatric diet advancement for Laparoscopic Gastric Bypass    Implementation:    Nutrition Education (Content):  a) Reviewed Laparoscopic Gastric Bypass diet guidelines  b) Provided handouts:  Nutrition After Laparoscopic Gastric Bypass and Vitamin and Mineral Supplements After Weight Loss Surgery    Nutrition Education (Application):  c) Discussed current eating habits and recommended alternative food choices  d) Patient verbalizes understanding of diet by listing appropriate foods for home    Anticipate good compliance    Diet Education - refer to Education Flowsheet    Goals:    Patient will follow bariatric diet advancement schedule    Patient will follow post-operative vitamin mineral schedule      Follow Up:    Patient to follow up in 2 weeks with RD in [facility]    Provided RD contact information for future questions      Ila Benson RD, LD  Clinical Dietitian      "

## 2017-07-20 NOTE — PLAN OF CARE
Problem: Goal Outcome Summary  Goal: Goal Outcome Summary  Outcome: No Change  Tolerating clear liquids. Berger removed this am. Void small amt. Up ambulating independently. Not yet passing flatus. Oxycodone and toradol effective pain management.

## 2017-07-20 NOTE — PROGRESS NOTES
"Bariatric Surgery Progress Note         Assessment:      Shobha Rader is a 32 year old female S/P Lap Gastric Bypass, POD #1   Morbid Obesity, BMI >40            Plan:   UGI this am was WNL.  Start protocol:    D/C hooks this am.  Start water now.  Clears at lunch.  Fulls tomorrow am.  D/C PCA when tolerating PO and start liquid pain medication.  Antiemetics PRN N/V.  Would trial off scopolamine patch.  Lovenox and PCDs.  Ambulate at least QID  Discussed OR findings    Dispo:Likely home tomorrow.        Interval History:   She is doing really well.  Pain well controlled but admits to pushing PCA every chance available.  RN instructed to relax on this some.  Feels some bloating and incisional pain is worst at Left lateral incision as anticipated.  Denies N/V.  Ambulating well.         Physical Exam:   /70  Pulse 116  Temp 98.3  F (36.8  C) (Oral)  Resp 16  Ht 1.74 m (5' 8.5\")  Wt (!) 137 kg (302 lb)  LMP 06/03/2017 (Exact Date)  SpO2 94%  BMI 45.25 kg/m2  I/O last 3 completed shifts:  In: 5724 [I.V.:5724]  Out: 2240 [Urine:2240]  General: NAD, pleasant, alert and oriented x3  Abdomen: soft, with binder on  Incision: no complaints  UGI: WNL    Labs (most recent at bottom):     Results for orders placed or performed during the hospital encounter of 07/19/17 (from the past 24 hour(s))   Platelet count   Result Value Ref Range    Platelet Count 249 150 - 450 10e9/L   Creatinine   Result Value Ref Range    Creatinine 0.62 0.52 - 1.04 mg/dL    GFR Estimate >90  Non  GFR Calc   >60 mL/min/1.7m2    GFR Estimate If Black >90   GFR Calc   >60 mL/min/1.7m2   XR Upper GI Water Soluble: Taz-en-Y    Narrative    UPPER GI WATER SOLUBLE July 20, 2017 8:40 AM     HISTORY: Taz-en-Y gastric bypass postoperative day 1.    COMPARISON: None.    FLUOROSCOPY TIME: 0.6 minutes.    SPOT FILMS: Two.    FINDINGS: A water soluble upper GI showed no leak. The  gastrojejunostomy is patent and " intact. No dilated loops of bowel are  noted.      Impression    IMPRESSION: Intact gastrojejunostomy.    MD Tonny POWER  Pager: [phone]  [facility]: [phone]

## 2017-07-20 NOTE — PLAN OF CARE
Problem: Goal Outcome Summary  Goal: Goal Outcome Summary  Outcome: No Change  A&Ox4. VSS on 2LPM O2 overnight. Lap sites CDI. Abdominal binder on. BS hypoactive/flatus-. Denies nausea. Pain managed with PCA pump, PRN toradol rating 3-6/10. Ice packs utilized. NPO/ice chips until UGI. Berger patent, adequate output. Dangled at side of bed.

## 2017-07-21 ENCOUNTER — NURSE TRIAGE (OUTPATIENT)
Dept: NURSING | Facility: CLINIC | Age: 32
End: 2017-07-21

## 2017-07-21 VITALS
HEART RATE: 86 BPM | DIASTOLIC BLOOD PRESSURE: 60 MMHG | TEMPERATURE: 98.1 F | HEIGHT: 69 IN | WEIGHT: 293 LBS | RESPIRATION RATE: 16 BRPM | SYSTOLIC BLOOD PRESSURE: 114 MMHG | BODY MASS INDEX: 43.4 KG/M2 | OXYGEN SATURATION: 96 %

## 2017-07-21 LAB — GLUCOSE BLDC GLUCOMTR-MCNC: 98 MG/DL (ref 70–99)

## 2017-07-21 PROCEDURE — 25000128 H RX IP 250 OP 636: Performed by: PHYSICIAN ASSISTANT

## 2017-07-21 PROCEDURE — 25000132 ZZH RX MED GY IP 250 OP 250 PS 637: Performed by: SURGERY

## 2017-07-21 PROCEDURE — 00000146 ZZHCL STATISTIC GLUCOSE BY METER IP

## 2017-07-21 PROCEDURE — 25000132 ZZH RX MED GY IP 250 OP 250 PS 637: Performed by: PHYSICIAN ASSISTANT

## 2017-07-21 RX ORDER — OXYCODONE HCL 5 MG/5 ML
5-10 SOLUTION, ORAL ORAL
Qty: 250 ML | Refills: 0 | Status: SHIPPED | OUTPATIENT
Start: 2017-07-21 | End: 2017-07-24

## 2017-07-21 RX ORDER — ONDANSETRON 4 MG/1
4 TABLET, ORALLY DISINTEGRATING ORAL EVERY 6 HOURS PRN
Qty: 20 TABLET | Refills: 0 | Status: SHIPPED | OUTPATIENT
Start: 2017-07-21 | End: 2017-08-02

## 2017-07-21 RX ADMIN — SODIUM CHLORIDE, POTASSIUM CHLORIDE, SODIUM LACTATE AND CALCIUM CHLORIDE: 600; 310; 30; 20 INJECTION, SOLUTION INTRAVENOUS at 01:41

## 2017-07-21 RX ADMIN — ACETAMINOPHEN 975 MG: 325 SOLUTION ORAL at 09:53

## 2017-07-21 RX ADMIN — RANITIDINE 75 MG: 75 TABLET, FILM COATED ORAL at 08:26

## 2017-07-21 RX ADMIN — ACETAMINOPHEN 975 MG: 325 SOLUTION ORAL at 01:38

## 2017-07-21 RX ADMIN — ENOXAPARIN SODIUM 40 MG: 40 INJECTION SUBCUTANEOUS at 08:26

## 2017-07-21 RX ADMIN — OXYCODONE HYDROCHLORIDE 10 MG: 5 SOLUTION ORAL at 01:38

## 2017-07-21 RX ADMIN — OXYCODONE HYDROCHLORIDE 10 MG: 5 SOLUTION ORAL at 09:53

## 2017-07-21 RX ADMIN — OXYCODONE HYDROCHLORIDE 10 MG: 5 SOLUTION ORAL at 06:39

## 2017-07-21 NOTE — DISCHARGE INSTRUCTIONS
For informational purposes only. Not to replace the advice of your health care provider. Copyright   2006 [facility]. All rights reserved. Mempile [id number] - REV 09/14  After Bariatric Surgery  [facility]  Note: Before you go home, ask your nurse to order your pain medicine from the pharmacy. Be sure you have your medicine with you when you leave.  How much fluid should I drink?    Drink at least 1 ounce of fluid every 15 minutes (1/2 cup per hour) during the day.     Carry a water bottle with you. Drink from it often.    Keep track of how much fluid you drink in a day.    Adjustable stomach band: Do not overeat or drink too much. This can cause vomiting (throwing up), stretch your stomach, or make your stomach slip up over your band.    Remember:  - Do not use straws, chew gum or suck on hard candies. They may cause painful gas.   - No cold drinks.  - No coffee, soda pop or drinks with caffeine. These may cause stomach pain.  - No alcohol. It is bad for your liver and will cause stomach pain. It also adds a lot of calories.  What can I do for pain control?      You had major abdominal surgery that involves all layers of your abdominal muscles. Pain is expected, even as far out as 6-8 weeks postop.  Moving, sneezing, coughing, and  breathing will cause pain because these activities use your abdominal muscles.      You can take the liquid pain medicine as prescribed. You can also try to wean off from it  as soon as you feel comfortable.  Do not drive while you are taking pain medicine.   This is dangerous.     You may take liquid Tylenol (acetaminophen) for pain in place of the prescribed pain  medicine. Do not take more than 3000 mg in a 24 hour period.     You may also apply ice or heat to the affected area(s).  Just remember to wrap the ice  in something and limit icing sessions to 20 minutes. Excessive icing can irritate the skin  or cause tissue damage.   You can apply  heat with a hot, wet towel or heating pad. Just like cold therapy, limit  heat application to 20 minutes. Never sleep with a heating pad on. It could cause severe  burns to your skin.    Do not take NSAID s (ibuprofen, Motrin, Advil, Aleve, Naproxen). They will increase you risk for bleeding or getting an ulcer.    If you have any of the following pain issues, we would like to talk to you:  - pain that does not improve with rest  - pain that gets worse and worse  - pain that is not controlled by your pain medicine  - a sudden severe increase in pain  -   If your doctor prescribes Zantac, take it as ordered. Take it for 3 months to prevent       Ulcers.  -   If you took an antacid before you had surgery, keep taking it for 3 months after           surgery. This will help prevent ulcers.   - Take any other medicines that your doctor tells you to take.   - Wear your binder to support your belly muscles.  Please call the clinic at [phone] to discuss prior to going to ED.     How much rest do I need?  Get plenty of rest the first few days after surgery. Balance rest and activity.  Do not nap more than one hour during the day. Set a timer to wake yourself up, if needed. Too much sleep will keep you from drinking enough fluid during the day.  What should I know about my incisions (cuts)?  - Change your bandages as needed or if they get wet.   -Call your doctor if you have any of these signs of infection:   - Redness around the site.   - Drainage that smells bad.   - Fever of 101  F (38.3  C) or higher when taken under the tongue.- Chills.  If you     have a drain:  - Do not pull on the drain. Do not pull the drain out. We will take out your drain at your first clinic visit.  - The color and amount of fluid varies. Right after surgery the fluid is bright red. Over time, it changes to light pink and may become clear or the color of straw.   Will my urine or bowel movements change?   You might not have a bowel movement  for several days after surgery. Your first bowel movements will likely be liquid.   Gastric bypass or sleeve gastrectomy: You may also notice old blood or a darker color in your stools (bowel movements).   Your urine should be clear to light yellow. This shows that you are drinking enough fluid. You should urinate (pass water) at least 2 to 3 times during the day. If not, call us.  What kind of activity is safe?  For 4 weeks after surgery:     Walk for a short time every day.    Do not jog or run.    No weight lifting or belly exercises.  No swimming, baths or hot tubs until your cuts are healed (scabs are gone). You may shower.  No outside activity in hot, humid weather until you can drink 48 to 64 ounces of fluid in 24 hours. If you sweat a lot, your body may lose too much water.   The first month after surgery, do not take any trips where you must sit for a long time. You could get a blood clot in your legs.    Call the clinic at [phone] if:    Your pain medicine is not working.    You do not urinate (pass water) 2 to 3 times per day.    You have any signs of infection.    You have belly pain that gets worse and worse.    You have swollen legs with pain behind the knee or calf.    You have chest pain or feel very short of breath.    You have any questions or concerns.    You have a sudden severe increase in heart rate.    You have vomiting that gets worse and worse.  When should I go back to the clinic?  Time Gastric bypass or sleeve gastrectomy Adjustable gastric band   Week 1 See the physician or physician assistant (PA). See the nurse and physical therapist.   Week 2 See the nurse and dietitian. See the nurse and dietitian.   Week 4 Have a nutrition consult with the dietitian. See the PA and dietitian.   Week 6  Go for the first adjustment (fill) of your stomach band.   For the first year (or until your BMI is less than 30) Go to the clinic each month to see if you need a band adjustment (fill).

## 2017-07-21 NOTE — PLAN OF CARE
Problem: Goal Outcome Summary  Goal: Goal Outcome Summary  Outcome: Improving  VSS on RA. Lap sites CDI, minimal bruising noted. Pain minimal on PRN oxycodone, tylenol. Tolerating intake. BS active, passing gas. LR @ 150. Up independently. Progressing per plan of care, plan for d/c today.

## 2017-07-21 NOTE — PLAN OF CARE
Problem: Goal Outcome Summary  Goal: Goal Outcome Summary  Outcome: No Change  Vs stable, tolerating PO, denies nausea, pain controlled with meds, voiding, ambulating, lap sites CDI, IS encouraged, doing well with PO intake, plan to d/c tomorrow.

## 2017-07-21 NOTE — PLAN OF CARE
Problem: Goal Outcome Summary  Goal: Goal Outcome Summary  Outcome: Completed Date Met:  07/21/17  A/Ox4, AVSS, tolerating full liquid, IND. Incisions covered with steri strips, CDI. Minimal ecchymosis at incisions. LS clear. BS active, +flatus, +BM. Pain controlled with liquid tylenol and oxycodone. Patient and  present for all discharge education. Patient and  verbalized understanding of all discharge education. Questions answered. Pt discharging to home with family support.

## 2017-07-21 NOTE — DISCHARGE SUMMARY
[facility] Discharge Summary    Shobha Rader MRN# [id number]   Age: 32 year old YOB: 1985     Date of Admission:  7/19/2017  Date of Discharge:  7/21/2017  Admitting Provider:  Austin Melara MD  Discharge Provider:  Tonny Nunez PA-C with Dr. Melara  Discharging Service: Bariatric Surgery     Primary Provider: Guerrero Benavides  Primary Care Physician Phone Number: [phone]          Admission Diagnoses:   Principle Diagnosis: MORBID OBESITY  Morbid obesity with BMI of 45.0-49.9, adult (H)  Secondary Diagnoses:  SAEID, Intertrigo          Discharge Diagnosis:   MORBID OBESITY          Procedures:   Procedure(s): Laparoscopic Taz-en-Y gastric bypass            Discharge Medications:     Current Discharge Medication List      START taking these medications    Details   ondansetron (ZOFRAN-ODT) 4 MG ODT tab Take 1 tablet (4 mg) by mouth every 6 hours as needed for nausea or vomiting  Qty: 20 tablet, Refills: 0    Associated Diagnoses: Morbid obesity with BMI of 45.0-49.9, adult (H); Bariatric surgery status; PONV (postoperative nausea and vomiting)      ranitidine (ZANTAC) 75 MG tablet Take 1 tablet (75 mg) by mouth 2 times daily  Qty: 60 tablet, Refills: 2    Associated Diagnoses: Bariatric surgery status; Morbid obesity with BMI of 45.0-49.9, adult (H)      oxyCODONE (ROXICODONE) 5 MG/5ML solution Take 5-10 mLs (5-10 mg) by mouth every 3 hours as needed for moderate to severe pain  Qty: 250 mL, Refills: 0    Associated Diagnoses: Bariatric surgery status; Morbid obesity with BMI of 45.0-49.9, adult (H); Acute post-operative pain         CONTINUE these medications which have NOT CHANGED    Details   CALCIUM-VITAMIN D PO Take 1,500 mg by mouth daily      desogestrel-ethinyl estradiol (ENSKYCE) 0.15-30 MG-MCG per tablet Take 1 tablet by mouth daily      Pediatric Multivit-Minerals-C (FLINTSTONES COMPLETE PO) Take 1-1.5 tablets by mouth daily      fluticasone (FLONASE) 50 MCG/ACT  spray Spray 1 spray into both nostrils daily as needed for rhinitis or allergies      VITAMIN D, CHOLECALCIFEROL, PO Take 2,000 Units by mouth daily       Acetaminophen (TYLENOL PO) Take 325-650 mg by mouth every 6 hours as needed       albuterol (PROAIR HFA/PROVENTIL HFA/VENTOLIN HFA) 108 (90 BASE) MCG/ACT Inhaler Inhale 2 puffs into the lungs every 6 hours as needed                  Allergies:         Allergies   Allergen Reactions     Metformin Other (See Comments)     Possible bleeding     Lanolin Rash     Vicodin [Hydrocodone-Acetaminophen] Itching and Rash             Brief History of Illness:   Shobha Rader is a 32 year old-year-old female who underwent preoperative screening in anticipation for potential bariatric surgery. She was deemed an appropriate candidate for bariatric surgery by the consensus of our [place] [facility] team. In the office, surgical options were thoroughly discussed. She was furnished with a copy of our specialized consent form for bariatric surgery. The potential risks as outlined in that document were all thoroughly reviewed. All questions were answered and she wished to proceed.    On the day of surgery, after reviewing the risks, benefits, and possible complications, informed consent was obtained and the patient underwent the above procedure.  There were no complications.  Please see the Operative Report for full details.           Hospital Course:   Shobha Rader's hospital course was unremarkable.  She recovered as anticipated and experienced no post-operative complications.  She did remarkably well post op with just some left sided soreness from lateral incision.    On the date of discharge, the patient was discharged to home in stable condition and afebrile.  She verbalized understanding of all discharge instructions and felt comfortable with the discharge plan.  She was asked to call with any further questions or concerns.         Image Results From This Hospital Stay:   "      Results for orders placed or performed during the hospital encounter of 07/19/17   XR Upper GI Water Soluble: Taz-en-Y    Narrative    UPPER GI WATER SOLUBLE July 20, 2017 8:40 AM     HISTORY: Taz-en-Y gastric bypass postoperative day 1.    COMPARISON: None.    FLUOROSCOPY TIME: 0.6 minutes.    SPOT FILMS: Two.    FINDINGS: A water soluble upper GI showed no leak. The  gastrojejunostomy is patent and intact. No dilated loops of bowel are  noted.      Impression    IMPRESSION: Intact gastrojejunostomy.    SHANTANU FLORES MD            Condition on Discharge:      Discharge condition: Stable   Discharge vitals: Blood pressure 114/60, pulse 86, temperature 98.1  F (36.7  C), temperature source Oral, resp. rate 18, height 1.74 m (5' 8.5\"), weight (!) 137 kg (302 lb), last menstrual period 06/03/2017, SpO2 96 %, unknown if currently breastfeeding.           Discharge Disposition:   Discharged to home          Discharge Instructions and Follow-Up:      Shobha RICHARD Rader was asked to follow up with the bariatric surgical team within 1 week.  She will see our clinic PA-C at that visit, with plans to reconvene with a Registered Dietician at the 2nd week office visit.    Tonny Nunez PA-C  dictating on behalf of Dr. Melara  [phone]     "

## 2017-07-22 NOTE — TELEPHONE ENCOUNTER
"  Reason for Disposition    Abdominal pains regularly occur about 1 hour after meals    Additional Information    Negative: Severe difficulty breathing (e.g., struggling for each breath, speaks in single words)    Negative: Shock suspected (e.g., cold/pale/clammy skin, too weak to stand, low BP, rapid pulse)    Negative: Difficult to awaken or acting confused  (e.g., disoriented, slurred speech)    Negative: Passed out (i.e., lost consciousness, collapsed and was not responding)    Negative: Visible sweat on face or sweat dripping down face    Negative: Sounds like a life-threatening emergency to the triager    Negative: Followed an abdomen (stomach) injury    Negative: Chest pain    Negative: [1] SEVERE pain (e.g., excruciating) AND [2] present > 1 hour    Negative: [1] Pain lasts > 10 minutes AND [2] age > 50    Negative: [1] Pain lasts > 10 minutes AND [2] age > 40 AND [3] associated chest, arm, neck, upper back or jaw pain    Negative: [1] Pain lasts > 10 minutes AND [2] age > 35 AND [3] at least one cardiac risk factor (i.e., hypertension, diabetes, obesity, smoker or strong family history of heart disease)    Negative: [1] Pain lasts > 10 minutes AND [2] history of heart disease (i.e., heart attack, bypass surgery, angina, angioplasty, CHF; not just a heart murmur)    Negative: [1] Pain lasts > 10 minutes AND [2] difficulty breathing    Negative: [1] Vomiting AND [2] contains red blood  (Exception: few streaks and only occurred once)    Negative: [1] Vomiting AND [2] contains black (\"coffee ground\") material    Negative: Blood in bowel movements  (Exception: Blood on surface of BM with constipation)    Negative: Black or tarry bowel movements  (Exception: chronic-unchanged  black-grey bowel movements AND is taking iron pills or Pepto-bismol)    Negative: [1] Pregnant > 24 weeks AND [2] hand or face swelling    Negative: Patient sounds very sick or weak to the triager    Negative: [1] MILD-MODERATE pain AND [2] " "constant AND [3] present > 2 hours    Negative: [1] MILD-MODERATE pain AND [2] not relieved by antacids    Negative: [1] Vomiting AND [2] contains bile (green color)    Negative: [1] Vomiting AND [2] abdomen looks much more swollen than usual    Negative: White of the eyes have turned yellow (i.e., jaundice)    Negative: Fever > 103 F (39.4 C)    Negative: [1] Fever > 101 F (38.3 C) AND [2] age > 60    Negative: [1] Fever > 101 F (38.3 C) AND [2] bedridden (e.g., nursing home patient, CVA, chronic illness, recovering from surgery)    Negative: [1] Fever > 100.5 F (38.1 C) AND [2] diabetes mellitus or weak immune system (e.g., HIV positive, cancer chemo, splenectomy, organ transplant, chronic steroids)    Negative: Abdominal pain is a chronic symptom (recurrent or ongoing AND present > 4 weeks)    Negative: Alcohol abuse known or suspected    Negative: [1] Abdominal pain is intermittent AND [2] shoots into chest, with sour taste in mouth    Negative: [1] MODERATE pain (e.g., interferes with normal activities) AND [2] comes and goes (cramps) AND [3] present > 24 hours  (Exception: pain with Vomiting or Diarrhea - see that Guideline)    Negative: [1] MILD pain (e.g., does not interfere with normal activities) AND [2] comes and goes (cramps) AND [3] present > 72 hours    Answer Assessment - Initial Assessment Questions  1. LOCATION: \"Where does it hurt?\"       Epigastric area, upper abdomen  2. RADIATION: \"Does the pain shoot anywhere else?\" (e.g., chest, back)      no  3. ONSET: \"When did the pain begin?\" (e.g., minutes, hours or days ago)       Since she began eating after gastric bypass  4. SUDDEN: \"Gradual or sudden onset?\"      gradual  5. PATTERN \"Does the pain come and go, or is it constant?\"     - If constant: \"Is it getting better, staying the same, or worsening?\"       (Note: Constant means the pain never goes away completely; most serious pain is constant and it progresses)      - If intermittent: \"How long " "does it last?\" \"Do you have pain now?\"      (Note: Intermittent means the pain goes away completely between bouts)      Increases and decreases  6. SEVERITY: \"How bad is the pain?\"  (e.g., Scale 1-10; mild, moderate, or severe)     - MILD (1-3): doesn't interfere with normal activities, abdomen soft and not tender to touch      - MODERATE (4-7): interferes with normal activities or awakens from sleep, tender to touch      - SEVERE (8-10): excruciating pain, doubled over, unable to do any normal activities        Mild-moderate  7. RECURRENT SYMPTOM: \"Have you ever had this type of abdominal pain before?\" If so, ask: \"When was the last time?\" and \"What happened that time?\"       no  8. AGGRAVATING FACTORS: \"Does anything seem to cause this pain?\" (e.g., foods, stress, alcohol)      eating  9. CARDIAC SYMPTOMS: \"Do you have any of the following symptoms: chest pain, difficulty breathing, sweating, nausea?\"      no  10. OTHER SYMPTOMS: \"Do you have any other symptoms?\" (e.g., fever, vomiting, diarrhea)        no  11. PREGNANCY: \"Is there any chance you are pregnant?\" \"When was your last menstrual period?\"        no    Protocols used: ABDOMINAL PAIN - UPPER-ADULT-AH    "

## 2017-07-24 ENCOUNTER — TELEPHONE (OUTPATIENT)
Dept: SURGERY | Facility: CLINIC | Age: 32
End: 2017-07-24

## 2017-07-24 DIAGNOSIS — Z98.84 BARIATRIC SURGERY STATUS: ICD-10-CM

## 2017-07-24 DIAGNOSIS — G89.18 ACUTE POST-OPERATIVE PAIN: ICD-10-CM

## 2017-07-24 DIAGNOSIS — E66.01 MORBID OBESITY WITH BMI OF 45.0-49.9, ADULT (H): ICD-10-CM

## 2017-07-24 RX ORDER — OXYCODONE HCL 5 MG/5 ML
5 SOLUTION, ORAL ORAL
Qty: 250 ML | Refills: 0 | Status: SHIPPED | OUTPATIENT
Start: 2017-07-24 | End: 2017-08-02

## 2017-07-24 NOTE — TELEPHONE ENCOUNTER
"Patient's  called and requested pain med refill for patient who had gastric bypass 7/19/17.  He LM on nurse line.  He stated that patient was using her oxycodone every 3 hours.  She has an appointment tomorrow and may run out of narcotic.  He is requesting a refill and return call.    Called patient and she stated:  Pain level at 4-5/10 without pain meds(Oxycodone 5mg/5ml).  When takes pain meds 1/10.  Taking oxycodone 10 ml every 3 hours even at night.  \"It's really bad in the morning\".    PO intake is going slow and going \"okay\".  BM's runny and brown -  daily  Occ. temp 99 otherwise WNL.  No other issues except for above.  Takes anti-nausea med, Zantac, and oxy and feels \"pretty good\".    Oxycodone makes her drowsy.  Not taking liquid Tylenol because too sugary and made her sick.  Could try rapid release Tylenol in between no > 3000 mg in 24 hours  She was happy to do this and cut back her Oxycodone to 5 mg every 3 hours.    Spoke with RAMU Parson will order more Oxycodone.  Called patient to let her know.   will be here before 5 pm to .  Patient verbalized understanding and is agreeable to plan.  Nimco Anand, MS, RD, RN    "

## 2017-07-25 ENCOUNTER — OFFICE VISIT (OUTPATIENT)
Dept: SURGERY | Facility: CLINIC | Age: 32
End: 2017-07-25
Payer: COMMERCIAL

## 2017-07-25 VITALS
RESPIRATION RATE: 16 BRPM | BODY MASS INDEX: 44.62 KG/M2 | DIASTOLIC BLOOD PRESSURE: 68 MMHG | HEART RATE: 58 BPM | OXYGEN SATURATION: 97 % | SYSTOLIC BLOOD PRESSURE: 129 MMHG | WEIGHT: 293 LBS | TEMPERATURE: 97.7 F

## 2017-07-25 DIAGNOSIS — E66.01 MORBID OBESITY (H): Primary | ICD-10-CM

## 2017-07-25 DIAGNOSIS — Z98.84 BARIATRIC SURGERY STATUS: ICD-10-CM

## 2017-07-25 DIAGNOSIS — E66.01 OBESITY, CLASS III, BMI 40-49.9 (MORBID OBESITY) (H): Primary | ICD-10-CM

## 2017-07-25 PROCEDURE — 99207 ZZC NO CHARGE NURSE ONLY: CPT

## 2017-07-25 PROCEDURE — 99024 POSTOP FOLLOW-UP VISIT: CPT | Performed by: PHYSICIAN ASSISTANT

## 2017-07-25 NOTE — PROGRESS NOTES
[facility]   1 Week Surgical Follow-Up     PCP:  Huy Smallwood    DOS: 07/19/17  Surgeon: SHERRIE  Surgery Type: Bypass  HISTORY OF PRESENT ILLNESS:  Shobha Rader returns today for her follow-up appointment status post bariatric surgery.  She is here with her .  She is currently using roxycodone for pain medication.  Just started taking one daily dose of tylenol. Refill Needed: No.  Patient's Pain Scale: 5. The pain is located left side of abdomen.   Patient's current daily fluid intake in oz is: 30 ounces water and milk.  Patient has started postoperative Vitamins: No.  Has not been using CPAP.  Activity:   Activity: walking inside every 2 hours  REVIEW OF SYSTEMS:  GI:    Nausea: Yes  Vomiting: No  Diarrhea: Yes  Constipation: No  Last BM: yesterday - runny and brown  Dysphagia: No  GERD: Yes - Started taking zantac in the past 2-3 days. Feeling better    CV/Pulmonary:   Chest Pain: No  Dizzy: No  Light Headed: Yes - mild   SOB: No - Has not been using IS  Endo:  Diabetes: No  :    Contraception: pill - second method includes condoms  Dysuria: No  Vascular:    LE Edema: No     PHYSICAL EXAMINATION:    /68 (BP Location: Left arm, Patient Position: Chair, Cuff Size: Adult Large)  Pulse 58  Temp 97.7  F (36.5  C)  Resp 16  Wt 297 lb 12.8 oz (135.1 kg)  LMP 06/03/2017 (Exact Date)  SpO2 97%  Breastfeeding? No  BMI 44.62 kg/m2     GENERAL: No acute distress. Alert and oriented time 3.  HEART: Regular rate and rhythm.  LUNGS:  Clear to auscultation bilaterally.  ABDOMEN: Soft, incisions clean,dry, and intact. Tenderness WNL for post op.  EXTREMITIES: No lower extremity edema bilaterally. No calf swelling or tenderness. Negative preeti's  SKIN: No rashes.  PSYCHOLOGICAL: Stable. Pleasant      ASSESSMENT:    1. S/P bariatric surgery.  2. Post surgical malabsorption  3. SAEID    PLAN:   Discussed increasing tylenol otc to 3 daily doses not exceeding 3000 mg daily to help with pain.   Also ice/heat.  Continue with recommended antacid medication for the next 3 months.   Strive to drink 64 oz of fluid daily. - Advised to at least double her current dose of 30 oz of fluid. If finds it too difficult should contact clinic for IVF.  Restart using CPAP   Strive to move hourly while awake to decrease risk of developing a blood clot.  Continue to do deep breathing exercises twice daily or use your spirometer.  Follow up with your primary care provider to discuss obesity related conditions by one month post op.   Start recommended postoperative vitamins within in the first 6 weeks.  Advance diet per Dietitian at your 2 week appointment.   Make follow up appointment to meet with psychologist at 1 and 3 months post operatively.   Wear binder to support abdominal muscles and gradually wean off over the next few weeks.   Return to clinic for 2 wk post op appointment and bring post op vitamins along.  Call with questions or concerns at any time.

## 2017-07-25 NOTE — MR AVS SNAPSHOT
After Visit Summary   7/25/2017    Shobha Rader    MRN: [id number]           Patient Information     Date Of Birth          1985        Visit Information        Provider Department      7/25/2017 2:00 PM Rn, Flory Meek, RN [facility]      Today's Diagnoses     Morbid obesity (H)    -  1    Bariatric surgery status           Follow-ups after your visit        Your next 10 appointments already scheduled     Jul 25, 2017  2:30 PM CDT   Post Op with Abigail Salinas PA-C   [facility] ([facility])    [address]  [address]  [facility] [address]   [phone]            Aug 02, 2017  9:00 AM CDT   Post Op with Flory Meek Rn, RN   [facility] ([facility])    [address]  [address]  [place] [facility] [address]   [phone]            Aug 02, 2017  9:30 AM CDT   Post Op with Flory Meek Diet 3, RD   [facility] ([facility])    [address]  [address]  [place] [address]   [phone]            Aug 21, 2017  8:30 AM CDT   Post Op with Flory Wl Diet 1, RD   [facility] ([facility])    [facility]  [address]  [facility] [address]   [phone]              Who to contact     If you have questions or need follow up information about today's clinic visit or your schedule please contact [facility] directly at [phone].  Normal or non-critical lab and imaging results will be communicated to you by MyChart, letter or phone within 4 business days after the clinic has received the results. If you do not hear from us within 7 days, please contact the clinic through MyChart or phone. If you have a critical or abnormal lab result, we  will notify you by phone as soon as possible.  Submit refill requests through CensorNet or call your pharmacy and they will forward the refill request to us. Please allow 3 business days for your refill to be completed.          Additional Information About Your Visit        Cross Mediaworkshart Information     CensorNet gives you secure access to your electronic health record. If you see a primary care provider, you can also send messages to your care team and make appointments. If you have questions, please call your primary care clinic.  If you do not have a primary care provider, please call [phone] and they will assist you.        Care EveryWhere ID     This is your Care EveryWhere ID. This could be used by other organizations to access your [facility] medical records  [id number]        Your Vitals Were     Last Period                   06/03/2017 (Exact Date)            Blood Pressure from Last 3 Encounters:   07/21/17 114/60   05/04/17 133/84   04/20/17 123/79    Weight from Last 3 Encounters:   07/19/17 (!) 302 lb (137 kg)   05/04/17 (!) 301 lb 3.2 oz (136.6 kg)   04/20/17 (!) 300 lb 12.8 oz (136.4 kg)              Today, you had the following     No orders found for display       Primary Care Provider Office Phone # Fax #    Huy Smallwood [phone]       [address] [address]        Equal Access to Services     [place] : Hadii aad ku hadasho Soomaali, waaxda luqadaha, qaybta kaalmada adeegyada, daniel becker haypatricia maradiaga . So [facility] [phone].    ATENCIÓN: Si habla español, tiene a lassiter disposición servicios gratuitos de asistencia lingüística. Verito al [phone].    We comply with applicable federal civil rights laws and [place] laws. We do not discriminate on the basis of race, color, national origin, age, disability sex, sexual orientation or gender identity.            Thank you!     Thank you for choosing [facility]  [facility]  for your care. Our goal is always to provide you with excellent care. Hearing back from our patients is one way we can continue to improve our services. Please take a few minutes to complete the written survey that you may receive in the mail after your visit with us. Thank you!             Your Updated Medication List - Protect others around you: Learn how to safely use, store and throw away your medicines at www.disposemymeds.org.          This list is accurate as of: 7/25/17  2:12 PM.  Always use your most recent med list.                   Brand Name Dispense Instructions for use Diagnosis    albuterol 108 (90 BASE) MCG/ACT Inhaler    PROAIR HFA/PROVENTIL HFA/VENTOLIN HFA     Inhale 2 puffs into the lungs every 6 hours as needed        CALCIUM-VITAMIN D PO      Take 1,500 mg by mouth daily        ENSKYCE 0.15-30 MG-MCG per tablet   Generic drug:  desogestrel-ethinyl estradiol      Take 1 tablet by mouth daily        FLINTSTONES COMPLETE PO      Take 1-1.5 tablets by mouth daily        fluticasone 50 MCG/ACT spray    FLONASE     Spray 1 spray into both nostrils daily as needed for rhinitis or allergies        ondansetron 4 MG ODT tab    ZOFRAN-ODT    20 tablet    Take 1 tablet (4 mg) by mouth every 6 hours as needed for nausea or vomiting    Morbid obesity with BMI of 45.0-49.9, adult (H), Bariatric surgery status, PONV (postoperative nausea and vomiting)       oxyCODONE 5 MG/5ML solution    ROXICODONE    250 mL    Take 5 mLs (5 mg) by mouth every 3 hours as needed for moderate to severe pain    Bariatric surgery status, Morbid obesity with BMI of 45.0-49.9, adult (H), Acute post-operative pain       ranitidine 75 MG tablet    ZANTAC    60 tablet    Take 1 tablet (75 mg) by mouth 2 times daily    Bariatric surgery status, Morbid obesity with BMI of 45.0-49.9, adult (H)       TYLENOL PO      Take 500 mg by mouth every 6 hours as needed for mild pain or fever        VITAMIN D  (CHOLECALCIFEROL) PO      Take 2,000 Units by mouth daily

## 2017-07-25 NOTE — PATIENT INSTRUCTIONS
PLAN:   Discussed increasing tylenol otc to 3 daily doses not exceeding 3000 mg daily to help with pain.  Also ice/heat.  Continue with recommended antacid medication for the next 3 months.   Strive to drink 64 oz of fluid daily. - Advised to at least double her current dose of 30 oz of fluid. If finds it too difficult should contact clinic for IVF.  Restart using CPAP   Strive to move hourly while awake to decrease risk of developing a blood clot.  Continue to do deep breathing exercises twice daily or use your spirometer.  Follow up with your primary care provider to discuss obesity related conditions by one month post op.   Start recommended postoperative vitamins within in the first 6 weeks.  Advance diet per Dietitian at your 2 week appointment.   Make follow up appointment to meet with psychologist at 1 and 3 months post operatively.   Wear binder to support abdominal muscles and gradually wean off over the next few weeks.   Return to clinic for 2 wk post op appointment and bring post op vitamins along.  Call with questions or concerns at any time.

## 2017-07-25 NOTE — MR AVS SNAPSHOT
After Visit Summary   7/25/2017    Shobha Rader    MRN: [id number]           Patient Information     Date Of Birth          1985        Visit Information        Provider Department      7/25/2017 2:30 PM Abigail Salinas PA-C [facility]      Today's Diagnoses     Obesity, Class III, BMI 40-49.9 (morbid obesity) (H)    -  1    Bariatric surgery status          Care Instructions    PLAN:   Discussed increasing tylenol otc to 3 daily doses not exceeding 3000 mg daily to help with pain.  Also ice/heat.  Continue with recommended antacid medication for the next 3 months.   Strive to drink 64 oz of fluid daily. - Advised to at least double her current dose of 30 oz of fluid. If finds it too difficult should contact clinic for IVF.  Restart using CPAP   Strive to move hourly while awake to decrease risk of developing a blood clot.  Continue to do deep breathing exercises twice daily or use your spirometer.  Follow up with your primary care provider to discuss obesity related conditions by one month post op.   Start recommended postoperative vitamins within in the first 6 weeks.  Advance diet per Dietitian at your 2 week appointment.   Make follow up appointment to meet with psychologist at 1 and 3 months post operatively.   Wear binder to support abdominal muscles and gradually wean off over the next few weeks.   Return to clinic for 2 wk post op appointment and bring post op vitamins along.  Call with questions or concerns at any time.            Follow-ups after your visit        Your next 10 appointments already scheduled     Aug 02, 2017  9:00 AM CDT   Post Op with Flory Meek Rn, RN   [facility] ([facility])    [address] [place] [address]   [phone]            Aug 02, 2017  9:30 AM CDT   Post Op with Flory Meek Diet 3, RD   [facility] ([facility])    [address]  [address]  [facility] [address] [phone]            Aug 21, 2017  8:30 AM CDT   Post Op with Flory Meek Diet 1, RD   [facility] ([facility])    [address]  [address]  [facility] [address] [phone]              Who to contact     If you have questions or need follow up information about today's clinic visit or your schedule please contact [facility] directly at [phone].  Normal or non-critical lab and imaging results will be communicated to you by Tiendeohart, letter or phone within 4 business days after the clinic has received the results. If you do not hear from us within 7 days, please contact the clinic through Rakuten MediaForget or phone. If you have a critical or abnormal lab result, we will notify you by phone as soon as possible.  Submit refill requests through Drinks4-you or call your pharmacy and they will forward the refill request to us. Please allow 3 business days for your refill to be completed.          Additional Information About Your Visit        Tiendeohart Information     Drinks4-you gives you secure access to your electronic health record. If you see a primary care provider, you can also send messages to your care team and make appointments. If you have questions, please call your primary care clinic.  If you do not have a primary care provider, please call [phone] and they will assist you.        Care EveryWhere ID     This is your Care EveryWhere ID. This could be used by other organizations to access your [facility] medical records  [id number]        Your Vitals Were     Pulse Temperature Respirations Last Period Pulse Oximetry Breastfeeding?    58 97.7  F (36.5  C) 16 06/03/2017 (Exact Date) 97% No    BMI (Body Mass Index)                   44.62 kg/m2            Blood Pressure from Last 3  Encounters:   07/25/17 129/68   07/21/17 114/60   05/04/17 133/84    Weight from Last 3 Encounters:   07/25/17 297 lb 12.8 oz (135.1 kg)   07/19/17 (!) 302 lb (137 kg)   05/04/17 (!) 301 lb 3.2 oz (136.6 kg)              Today, you had the following     No orders found for display       Primary Care Provider Office Phone # Fax #    Huy Smallwood [phone]       [address] [address]        Equal Access to Services     [facility] : Hadii aad ku hadasho Soayah, waaxda luqadaha, qaybta kaalmada adedarrenyanoe, daniel maradiaga . So [facility] [phone].    ATENCIÓN: Si habla español, tiene a lassiter disposición servicios gratuitos de asistencia lingüística. [facility] [phone].    We comply with applicable federal civil rights laws and [place] laws. We do not discriminate on the basis of race, color, national origin, age, disability sex, sexual orientation or gender identity.            Thank you!     Thank you for choosing [facility]  for your care. Our goal is always to provide you with excellent care. Hearing back from our patients is one way we can continue to improve our services. Please take a few minutes to complete the written survey that you may receive in the mail after your visit with us. Thank you!             Your Updated Medication List - Protect others around you: Learn how to safely use, store and throw away your medicines at www.disposemymeds.org.          This list is accurate as of: 7/25/17  3:00 PM.  Always use your most recent med list.                   Brand Name Dispense Instructions for use Diagnosis    albuterol 108 (90 BASE) MCG/ACT Inhaler    PROAIR HFA/PROVENTIL HFA/VENTOLIN HFA     Inhale 2 puffs into the lungs every 6 hours as needed        CALCIUM-VITAMIN D PO      Take 1,500 mg by mouth daily        ENSKYCE 0.15-30 MG-MCG per tablet   Generic drug:  desogestrel-ethinyl  estradiol      Take 1 tablet by mouth daily        FLINTSTONES COMPLETE PO      Take 1-1.5 tablets by mouth daily        fluticasone 50 MCG/ACT spray    FLONASE     Spray 1 spray into both nostrils daily as needed for rhinitis or allergies        ondansetron 4 MG ODT tab    ZOFRAN-ODT    20 tablet    Take 1 tablet (4 mg) by mouth every 6 hours as needed for nausea or vomiting    Morbid obesity with BMI of 45.0-49.9, adult (H), Bariatric surgery status, PONV (postoperative nausea and vomiting)       oxyCODONE 5 MG/5ML solution    ROXICODONE    250 mL    Take 5 mLs (5 mg) by mouth every 3 hours as needed for moderate to severe pain    Bariatric surgery status, Morbid obesity with BMI of 45.0-49.9, adult (H), Acute post-operative pain       ranitidine 75 MG tablet    ZANTAC    60 tablet    Take 1 tablet (75 mg) by mouth 2 times daily    Bariatric surgery status, Morbid obesity with BMI of 45.0-49.9, adult (H)       TYLENOL PO      Take 500 mg by mouth every 6 hours as needed for mild pain or fever        VITAMIN D (CHOLECALCIFEROL) PO      Take 2,000 Units by mouth daily

## 2017-07-27 ENCOUNTER — TELEPHONE (OUTPATIENT)
Dept: SURGERY | Facility: CLINIC | Age: 32
End: 2017-07-27

## 2017-07-27 NOTE — TELEPHONE ENCOUNTER
Patient who had gastric bypass 7/19/17 called and LM on NL stating:  Running low grade temp.  Her sister was in perterm labor yesterday and patient was with her all day.  Last night her temp was 100.9 - 101 took tyl, and it went down now back at 100.9 today.    Fluid intake yesterday - 40 oz. Higher than normal (was at 30 oz).  IS - did once yesterday and not doing much days prior to.  Pain - as long as takes narcotis is at a zero. If not on taking narcotics it is at a 4-5/10.  Takes 5 ml Oxycodone q 4 hours and Tyl 500 mg every 6 hrs.  N/V - little nauseated and took anti-nausea - now is fine.  No Sob , CP, increase HR, light headed or dizzy.  No signs of UTI     Suspect patient is dry and needs to use IS.   Plan:  Drink, breath, rest, but still move to prevent DVT.  If light headed and dizzy - call for fluids in the next couple of days.   Set timer to drink.  To ED if light headed, dizzy, SOB, increase HR, CP, bleeding.  Patient verbalized understanding and is agreeable to plan.  Nimco Anand, MS, RD, RN

## 2017-07-30 ENCOUNTER — HOSPITAL ENCOUNTER (EMERGENCY)
Facility: CLINIC | Age: 32
Discharge: HOME | End: 2017-07-30
Attending: EMERGENCY MEDICINE | Admitting: EMERGENCY MEDICINE
Payer: COMMERCIAL

## 2017-07-30 VITALS
WEIGHT: 293 LBS | DIASTOLIC BLOOD PRESSURE: 71 MMHG | SYSTOLIC BLOOD PRESSURE: 120 MMHG | BODY MASS INDEX: 44.41 KG/M2 | OXYGEN SATURATION: 99 % | TEMPERATURE: 97.8 F | HEIGHT: 68 IN | RESPIRATION RATE: 16 BRPM

## 2017-07-30 DIAGNOSIS — E86.0 DEHYDRATION: ICD-10-CM

## 2017-07-30 LAB
ALBUMIN SERPL-MCNC: 3.5 G/DL (ref 3.4–5)
ALP SERPL-CCNC: 92 U/L (ref 40–150)
ALT SERPL W P-5'-P-CCNC: 23 U/L (ref 0–50)
ANION GAP SERPL CALCULATED.3IONS-SCNC: 11 MMOL/L (ref 3–14)
AST SERPL W P-5'-P-CCNC: 9 U/L (ref 0–45)
BASOPHILS # BLD AUTO: 0.1 10E9/L (ref 0–0.2)
BASOPHILS NFR BLD AUTO: 1 %
BILIRUB SERPL-MCNC: 0.3 MG/DL (ref 0.2–1.3)
BUN SERPL-MCNC: 10 MG/DL (ref 7–30)
CALCIUM SERPL-MCNC: 9.1 MG/DL (ref 8.5–10.1)
CHLORIDE SERPL-SCNC: 103 MMOL/L (ref 94–109)
CO2 SERPL-SCNC: 24 MMOL/L (ref 20–32)
CREAT SERPL-MCNC: 0.75 MG/DL (ref 0.52–1.04)
DIFFERENTIAL METHOD BLD: NORMAL
EOSINOPHIL # BLD AUTO: 0 10E9/L (ref 0–0.7)
EOSINOPHIL NFR BLD AUTO: 0 %
ERYTHROCYTE [DISTWIDTH] IN BLOOD BY AUTOMATED COUNT: 13.3 % (ref 10–15)
GFR SERPL CREATININE-BSD FRML MDRD: NORMAL ML/MIN/1.7M2
GLUCOSE SERPL-MCNC: 91 MG/DL (ref 70–99)
HCT VFR BLD AUTO: 39.1 % (ref 35–47)
HGB BLD-MCNC: 12.7 G/DL (ref 11.7–15.7)
LYMPHOCYTES # BLD AUTO: 3.4 10E9/L (ref 0.8–5.3)
LYMPHOCYTES NFR BLD AUTO: 38 %
MCH RBC QN AUTO: 28.3 PG (ref 26.5–33)
MCHC RBC AUTO-ENTMCNC: 32.5 G/DL (ref 31.5–36.5)
MCV RBC AUTO: 87 FL (ref 78–100)
MONOCYTES # BLD AUTO: 0.5 10E9/L (ref 0–1.3)
MONOCYTES NFR BLD AUTO: 6 %
NEUTROPHILS # BLD AUTO: 4.9 10E9/L (ref 1.6–8.3)
NEUTROPHILS NFR BLD AUTO: 55 %
PLATELET # BLD AUTO: 405 10E9/L (ref 150–450)
PLATELET # BLD EST: NORMAL 10*3/UL
POTASSIUM SERPL-SCNC: 3.7 MMOL/L (ref 3.4–5.3)
PROT SERPL-MCNC: 8.5 G/DL (ref 6.8–8.8)
RBC # BLD AUTO: 4.48 10E12/L (ref 3.8–5.2)
RBC MORPH BLD: NORMAL
SODIUM SERPL-SCNC: 138 MMOL/L (ref 133–144)
WBC # BLD AUTO: 8.9 10E9/L (ref 4–11)

## 2017-07-30 PROCEDURE — 99284 EMERGENCY DEPT VISIT MOD MDM: CPT | Mod: 25

## 2017-07-30 PROCEDURE — 96361 HYDRATE IV INFUSION ADD-ON: CPT

## 2017-07-30 PROCEDURE — 25000128 H RX IP 250 OP 636: Performed by: EMERGENCY MEDICINE

## 2017-07-30 PROCEDURE — 96360 HYDRATION IV INFUSION INIT: CPT

## 2017-07-30 PROCEDURE — 85025 COMPLETE CBC W/AUTO DIFF WBC: CPT | Performed by: EMERGENCY MEDICINE

## 2017-07-30 PROCEDURE — 80053 COMPREHEN METABOLIC PANEL: CPT | Performed by: EMERGENCY MEDICINE

## 2017-07-30 RX ORDER — SODIUM CHLORIDE 9 MG/ML
1000 INJECTION, SOLUTION INTRAVENOUS CONTINUOUS
Status: DISCONTINUED | OUTPATIENT
Start: 2017-07-30 | End: 2017-07-30 | Stop reason: HOSPADM

## 2017-07-30 RX ADMIN — SODIUM CHLORIDE 1000 ML: 9 INJECTION, SOLUTION INTRAVENOUS at 17:01

## 2017-07-30 ASSESSMENT — ENCOUNTER SYMPTOMS
VOMITING: 0
DIARRHEA: 0
FEVER: 0
NAUSEA: 1
LIGHT-HEADEDNESS: 1
APPETITE CHANGE: 1
ABDOMINAL PAIN: 0

## 2017-07-30 NOTE — ED PROVIDER NOTES
"  History     Chief Complaint:  Generalized weakness and Nausea     HPI   Shobha Rader is a 32 year old female 11 days status post laparoscopic alissa en y gastric bypass with Dr. eMlara who presents to the emergency department with concern for generalized weakness. Following the patient's recent procedure, she states that she has generally been feeling well without significant abdominal discomfort. However, the patient states that she has had some lightheadedness and nausea in the past two days and notes that she has had difficulty getting fluids. She presents to the ED today with concerns of dehydration. She denies any recent fevers, abdominal pain, vomiting, or diarrhea.       Allergies:  Metformin  Lanolin  Vicodin [Hydrocodone-Acetaminophen]    Medications:    Tylenol  Oxycodone  Zofran  Zantac  desogestrel-ethinyl estradiol  Flonase  Albuterol     Past Medical History:    Anxiety  Asthma  obstructive sleep apnea  TMJ dysfunction  Morbid Obesity  Intertrigo    Past Surgical History:    Laparoscopic Gastric bypass   Partial mastectomy, right breast   [place] teeth removal     Family History:    obstructive sleep apnea  DM  HTN  CHF  Cerebrovascular disease    Social History:  Presents with her .   Negative for tobacco use.  Negative for alcohol use.  Marital Status:   [2]    Review of Systems   Constitutional: Positive for appetite change. Negative for fever.   Gastrointestinal: Positive for nausea. Negative for abdominal pain, diarrhea and vomiting.   Neurological: Positive for light-headedness.   All other systems reviewed and are negative.    Physical Exam     Physical Exam    Patient Vitals for the past 24 hrs:   BP Temp Temp src Heart Rate Resp SpO2 Height Weight   07/30/17 1642 120/71 97.8  F (36.6  C) Oral 78 16 99 % 1.727 m (5' 8\") 134.7 kg (297 lb)       General: Alert and Interactive.   Head: No signs of trauma.   Mouth/Throat: Oropharynx is clear and moist.   Eyes: Conjunctivae are " normal. Pupils are equal, round, and reactive to light.   Neck: Normal range of motion. No nuchal rigidity.   CV: Normal rate and regular rhythm.    Resp: Effort normal and breath sounds normal. No respiratory distress.   GI: Soft. There is no tenderness or guarding.   MSK: Normal range of motion. no edema.   Neuro: The patient is alert and oriented to person, place, and time.  PERRLA, EOMI, strength in upper/lower extremities normal and symmetrical.   Sensation normal. Speech normal.  GCS eye subscore is 4. GCS verbal subscore is 5. GCS motor subscore is 6.   Skin: Skin is warm and dry. No rash noted.   Psych: normal mood and affect. behavior is normal.     Emergency Department Course   Laboratory:  CBC: WBC: 8.9, HGB: 12.7, PLT: 405  CMP: all WNL (Creatinine: 0.75)    Interventions:  1701 NS 1L IV    Emergency Department Course:  Nursing notes and vitals reviewed. I performed an exam of the patient as documented above.     IV inserted. Medicine administered as documented above. Blood drawn. This was sent to the lab for further testing, results above.    1903 I reevaluated the patient and provided an update in regards to her ED course.      Findings and plan explained to the Patient. Patient discharged home with instructions regarding supportive care, medications, and reasons to return. The importance of close follow-up was reviewed.     I personally reviewed the laboratory results with the Patient and answered all related questions prior to discharge.     Impression & Plan    Medical Decision Making:  Shobha Rader is a 32 year old female who presents to the ED complaining of nausea. She recently had a alissa en y surgery for bariatric concerns. She is doing well otherwise. Laboratory studies reveal no significant electrolyte disturbance or signs of systematic infection. She received one liter of NS and is feeling much improved. She will follow up with Dr. Melara tomorrow as needed.     Diagnosis:    ICD-10-CM    1. Dehydration E86.0     Disposition:  discharged to home    I, Vielka Laws, am serving as a scribe on 7/30/2017 at 4:42 PM to personally document services performed by Mathew Orlando MD based on my observations and the provider's statements to me.     Vielka Laws  7/30/2017    EMERGENCY DEPARTMENT       Mathew Orlando MD  07/31/17 0137

## 2017-07-30 NOTE — ED AVS SNAPSHOT
Emergency Department    [address] [place] [address]    Phone:  [phone]    Fax:  [phone]                                       Shobha Rader   MRN: [id number]    Department:   Emergency Department   Date of Visit:  7/30/2017           After Visit Summary Signature Page     I have received my discharge instructions, and my questions have been answered. I have discussed any challenges I see with this plan with the nurse or doctor.    ..........................................................................................................................................  Patient/Patient Representative Signature      ..........................................................................................................................................  Patient Representative Print Name and Relationship to Patient    ..................................................               ................................................  Date                                            Time    ..........................................................................................................................................  Reviewed by Signature/Title    ...................................................              ..............................................  Date                                                            Time

## 2017-07-30 NOTE — ED AVS SNAPSHOT
Emergency Department    [address] [place] [address]    Phone:  [phone]    Fax:  [phone]                                       Shobha Rader   MRN: [id number]    Department:   Emergency Department   Date of Visit:  7/30/2017           Patient Information     Date Of Birth          1985        Your diagnoses for this visit were:     Dehydration        You were seen by Mathew Orlando MD.      Follow-up Information     Follow up with Austin Melara MD In 1 day.    Specialty:  General Surgery    Contact information:    [facility]  [address] [place] [address]  [phone]          Discharge Instructions         Dehydration (Adult)  Dehydration occurs when your body loses too much fluid. This may be the result of prolonged vomiting or diarrhea, excessive sweating, or a high fever. It may also happen if you don t drink enough fluid when you re sick or out in the heat. Misuse of diuretics (water pills) can also be a cause.  Symptoms include thirst and decreased urine output. You may also feel dizzy, weak, fatigued, or very drowsy. The diet described below is usually enough to treat dehydration. In some cases, you may need medicine.  Home care    Drink at least 12 8-ounce glasses of fluid every day to resolve the dehydration. Fluid may include water; orange juice; lemonade; apple, grape, or cranberry juice; clear fruit drinks; electrolyte replacement and sports drinks; and teas and coffee without caffeine. If you have been diagnosed with a kidney disease, ask your doctor how much and what types of fluids you should drink to prevent dehydration. If you have kidney disease, fluid can build up in the body. This can be dangerous to your health.    If you have a fever, muscle aches, or a headache as a result of a cold or flu, you may take acetaminophen or ibuprofen, unless another medicine was prescribed. If you have chronic liver or kidney disease,  or have ever had a stomach ulcer or gastrointestinal bleeding, talk with your health care provider before using these medicines. Don't take aspirin if you are younger than 18 and have a fever. Aspirin raises the chance for severe liver injury.  Follow-up care  Follow up with your health care provider, or as advised.  When to seek medical advice  Call your health care provider right away if any of these occur:    Continued vomiting    Frequent diarrhea (more than 5 times a day); blood (red or black color) or mucus in diarrhea    Blood in vomit or stool    Swollen abdomen or increasing abdominal pain    Weakness, dizziness, or fainting    Unusual drowsiness or confusion    Reduced urine output or extreme thirst    Fever of 100.4 F (34 C) or higher  Date Last Reviewed: 5/31/2015 2000-2017 [employer]. [address] [place] [address]. All rights reserved. This information is not intended as a substitute for professional medical care. Always follow your healthcare professional's instructions.          Future Appointments        Provider Department Dept Phone Center    8/2/2017 9:00 AM Flory Meek Rn, RN [facility] [phone] [facility]    8/2/2017 9:30 AM Flory Meek Diet 3, RD [facility] [phone] [facility]    8/21/2017 8:30 AM Flory Meek Diet 1, RD [facility] [phone] [facility]      24 Hour Appointment Hotline       To make an appointment at any [facility], call [phone] ([phone]). If you don't have a family doctor or clinic, we will help you find one. [facility] are conveniently located to serve the needs of you and your family.             Review of your medicines      Our records show that you are taking the medicines listed below. If these are incorrect, please call your family doctor or clinic.        Dose / Directions Last dose taken    albuterol 108 (90 BASE)  MCG/ACT Inhaler   Commonly known as:  PROAIR HFA/PROVENTIL HFA/VENTOLIN HFA   Dose:  2 puff        Inhale 2 puffs into the lungs every 6 hours as needed   Refills:  0        CALCIUM-VITAMIN D PO   Dose:  1500 mg        Take 1,500 mg by mouth daily   Refills:  0        ENSKYCE 0.15-30 MG-MCG per tablet   Dose:  1 tablet   Generic drug:  desogestrel-ethinyl estradiol        Take 1 tablet by mouth daily   Refills:  0        FLINTSTONES COMPLETE PO   Dose:  1-1.5 tablet        Take 1-1.5 tablets by mouth daily   Refills:  0        fluticasone 50 MCG/ACT spray   Commonly known as:  FLONASE   Dose:  1 spray        Spray 1 spray into both nostrils daily as needed for rhinitis or allergies   Refills:  0        ondansetron 4 MG ODT tab   Commonly known as:  ZOFRAN-ODT   Dose:  4 mg   Quantity:  20 tablet        Take 1 tablet (4 mg) by mouth every 6 hours as needed for nausea or vomiting   Refills:  0        oxyCODONE 5 MG/5ML solution   Commonly known as:  ROXICODONE   Dose:  5 mg   Quantity:  250 mL        Take 5 mLs (5 mg) by mouth every 3 hours as needed for moderate to severe pain   Refills:  0        ranitidine 75 MG tablet   Commonly known as:  ZANTAC   Dose:  75 mg   Quantity:  60 tablet        Take 1 tablet (75 mg) by mouth 2 times daily   Refills:  2        TYLENOL PO   Dose:  500 mg        Take 500 mg by mouth every 6 hours as needed for mild pain or fever   Refills:  0        VITAMIN D (CHOLECALCIFEROL) PO   Dose:  2000 Units        Take 2,000 Units by mouth daily   Refills:  0                Procedures and tests performed during your visit     CBC with platelets + differential    Comprehensive metabolic panel      Orders Needing Specimen Collection     None      Pending Results     No orders found from 7/28/2017 to 7/31/2017.            Pending Culture Results     No orders found from 7/28/2017 to 7/31/2017.            Pending Results Instructions     If you had any lab results that were not finalized at the time  of your Discharge, you can call the ED Lab Result RN at [phone]. You will be contacted by this team for any positive Lab results or changes in treatment. The nurses are available 7 days a week from 10A to 6:30P.  You can leave a message 24 hours per day and they will return your call.        Test Results From Your Hospital Stay        7/30/2017  6:07 PM      Component Results     Component Value Ref Range & Units Status    WBC 8.9 4.0 - 11.0 10e9/L Final    RBC Count 4.48 3.8 - 5.2 10e12/L Final    Hemoglobin 12.7 11.7 - 15.7 g/dL Final    Hematocrit 39.1 35.0 - 47.0 % Final    MCV 87 78 - 100 fl Final    MCH 28.3 26.5 - 33.0 pg Final    MCHC 32.5 31.5 - 36.5 g/dL Final    RDW 13.3 10.0 - 15.0 % Final    Platelet Count 405 150 - 450 10e9/L Final    Diff Method Manual Differential  Final    % Neutrophils 55.0 % Final    % Lymphocytes 38.0 % Final    % Monocytes 6.0 % Final    % Eosinophils 0.0 % Final    % Basophils 1.0 % Final    Absolute Neutrophil 4.9 1.6 - 8.3 10e9/L Final    Absolute Lymphocytes 3.4 0.8 - 5.3 10e9/L Final    Absolute Monocytes 0.5 0.0 - 1.3 10e9/L Final    Absolute Eosinophils 0.0 0.0 - 0.7 10e9/L Final    Absolute Basophils 0.1 0.0 - 0.2 10e9/L Final    RBC Morphology Normal  Final    Platelet Estimate   Final    Confirming automated cell count         7/30/2017  5:32 PM      Component Results     Component Value Ref Range & Units Status    Sodium 138 133 - 144 mmol/L Final    Potassium 3.7 3.4 - 5.3 mmol/L Final    Chloride 103 94 - 109 mmol/L Final    Carbon Dioxide 24 20 - 32 mmol/L Final    Anion Gap 11 3 - 14 mmol/L Final    Glucose 91 70 - 99 mg/dL Final    Urea Nitrogen 10 7 - 30 mg/dL Final    Creatinine 0.75 0.52 - 1.04 mg/dL Final    GFR Estimate >90  Non  GFR Calc   >60 mL/min/1.7m2 Final    GFR Estimate If Black >90   GFR Calc   >60 mL/min/1.7m2 Final    Calcium 9.1 8.5 - 10.1 mg/dL Final    Bilirubin Total 0.3 0.2 - 1.3 mg/dL Final    Albumin  3.5 3.4 - 5.0 g/dL Final    Protein Total 8.5 6.8 - 8.8 g/dL Final    Alkaline Phosphatase 92 40 - 150 U/L Final    ALT 23 0 - 50 U/L Final    AST 9 0 - 45 U/L Final                Clinical Quality Measure: Blood Pressure Screening     Your blood pressure was checked while you were in the emergency department today. The last reading we obtained was  BP: 120/71 . Please read the guidelines below about what these numbers mean and what you should do about them.  If your systolic blood pressure (the top number) is less than 120 and your diastolic blood pressure (the bottom number) is less than 80, then your blood pressure is normal. There is nothing more that you need to do about it.  If your systolic blood pressure (the top number) is 120-139 or your diastolic blood pressure (the bottom number) is 80-89, your blood pressure may be higher than it should be. You should have your blood pressure rechecked within a year by a primary care provider.  If your systolic blood pressure (the top number) is 140 or greater or your diastolic blood pressure (the bottom number) is 90 or greater, you may have high blood pressure. High blood pressure is treatable, but if left untreated over time it can put you at risk for heart attack, stroke, or kidney failure. You should have your blood pressure rechecked by a primary care provider within the next 4 weeks.  If your provider in the emergency department today gave you specific instructions to follow-up with your doctor or provider even sooner than that, you should follow that instruction and not wait for up to 4 weeks for your follow-up visit.        Thank you for choosing [facility]       Thank you for choosing [facility] for your care. Our goal is always to provide you with excellent care. Hearing back from our patients is one way we can continue to improve our services. Please take a few minutes to complete the written survey that you may receive in the mail after you visit with us. Thank  you!        [facility] Information     [employer] gives you secure access to your electronic health record. If you see a primary care provider, you can also send messages to your care team and make appointments. If you have questions, please call your primary care clinic.  If you do not have a primary care provider, please call [phone] and they will assist you.        Care EveryWhere ID     This is your Care EveryWhere ID. This could be used by other organizations to access your [facility] medical records  [id number]        Equal Access to Services     CY MILLS : Hadlynne whitman Soayah, waaxda luqadaha, qaybta kaalmada sherrie, daniel maradiaga . So [facility] [phone].    ATENCIÓN: Si habla español, tiene a lassiter disposición servicios gratuitos de asistencia lingüística. Llame al [phone].    We comply with applicable federal civil rights laws and [place] laws. We do not discriminate on the basis of race, color, national origin, age, disability sex, sexual orientation or gender identity.            After Visit Summary       This is your record. Keep this with you and show to your community pharmacist(s) and doctor(s) at your next visit.

## 2017-07-31 NOTE — DISCHARGE INSTRUCTIONS
Dehydration (Adult)  Dehydration occurs when your body loses too much fluid. This may be the result of prolonged vomiting or diarrhea, excessive sweating, or a high fever. It may also happen if you don t drink enough fluid when you re sick or out in the heat. Misuse of diuretics (water pills) can also be a cause.  Symptoms include thirst and decreased urine output. You may also feel dizzy, weak, fatigued, or very drowsy. The diet described below is usually enough to treat dehydration. In some cases, you may need medicine.  Home care    Drink at least 12 8-ounce glasses of fluid every day to resolve the dehydration. Fluid may include water; orange juice; lemonade; apple, grape, or cranberry juice; clear fruit drinks; electrolyte replacement and sports drinks; and teas and coffee without caffeine. If you have been diagnosed with a kidney disease, ask your doctor how much and what types of fluids you should drink to prevent dehydration. If you have kidney disease, fluid can build up in the body. This can be dangerous to your health.    If you have a fever, muscle aches, or a headache as a result of a cold or flu, you may take acetaminophen or ibuprofen, unless another medicine was prescribed. If you have chronic liver or kidney disease, or have ever had a stomach ulcer or gastrointestinal bleeding, talk with your health care provider before using these medicines. Don't take aspirin if you are younger than 18 and have a fever. Aspirin raises the chance for severe liver injury.  Follow-up care  Follow up with your health care provider, or as advised.  When to seek medical advice  Call your health care provider right away if any of these occur:    Continued vomiting    Frequent diarrhea (more than 5 times a day); blood (red or black color) or mucus in diarrhea    Blood in vomit or stool    Swollen abdomen or increasing abdominal pain    Weakness, dizziness, or fainting    Unusual drowsiness or confusion    Reduced urine  output or extreme thirst    Fever of 100.4 F (34 C) or higher  Date Last Reviewed: 5/31/2015 2000-2017 [employer]. [address] [place] [address], [place] [address]. All rights reserved. This information is not intended as a substitute for professional medical care. Always follow your healthcare professional's instructions.

## 2017-08-02 ENCOUNTER — TELEPHONE (OUTPATIENT)
Dept: SURGERY | Facility: CLINIC | Age: 32
End: 2017-08-02

## 2017-08-02 ENCOUNTER — HOSPITAL ENCOUNTER (OUTPATIENT)
Dept: LAB | Facility: CLINIC | Age: 32
Discharge: HOME | End: 2017-08-02
Attending: PHYSICIAN ASSISTANT | Admitting: PHYSICIAN ASSISTANT
Payer: COMMERCIAL

## 2017-08-02 ENCOUNTER — OFFICE VISIT (OUTPATIENT)
Dept: SURGERY | Facility: CLINIC | Age: 32
End: 2017-08-02
Payer: COMMERCIAL

## 2017-08-02 VITALS
RESPIRATION RATE: 18 BRPM | SYSTOLIC BLOOD PRESSURE: 118 MMHG | WEIGHT: 284.1 LBS | DIASTOLIC BLOOD PRESSURE: 76 MMHG | TEMPERATURE: 97.3 F | HEART RATE: 100 BPM | BODY MASS INDEX: 43.2 KG/M2 | OXYGEN SATURATION: 98 %

## 2017-08-02 DIAGNOSIS — N39.0 URINARY TRACT INFECTION, SITE UNSPECIFIED: ICD-10-CM

## 2017-08-02 DIAGNOSIS — Z98.84 BARIATRIC SURGERY STATUS: Primary | ICD-10-CM

## 2017-08-02 DIAGNOSIS — R30.0 DYSURIA: ICD-10-CM

## 2017-08-02 DIAGNOSIS — Z98.890 PONV (POSTOPERATIVE NAUSEA AND VOMITING): ICD-10-CM

## 2017-08-02 DIAGNOSIS — E66.01 OBESITY, CLASS III, BMI 40-49.9 (MORBID OBESITY) (H): ICD-10-CM

## 2017-08-02 DIAGNOSIS — R11.2 PONV (POSTOPERATIVE NAUSEA AND VOMITING): ICD-10-CM

## 2017-08-02 DIAGNOSIS — R11.0 NAUSEA: ICD-10-CM

## 2017-08-02 DIAGNOSIS — Z98.84 BARIATRIC SURGERY STATUS: ICD-10-CM

## 2017-08-02 DIAGNOSIS — E66.01 MORBID OBESITY WITH BMI OF 45.0-49.9, ADULT (H): ICD-10-CM

## 2017-08-02 LAB
ALBUMIN UR-MCNC: 30 MG/DL
AMORPH CRY #/AREA URNS HPF: ABNORMAL /HPF
APPEARANCE UR: ABNORMAL
BILIRUB UR QL STRIP: ABNORMAL
COLOR UR AUTO: ABNORMAL
GLUCOSE UR STRIP-MCNC: NEGATIVE MG/DL
HGB UR QL STRIP: ABNORMAL
KETONES UR STRIP-MCNC: 10 MG/DL
LEUKOCYTE ESTERASE UR QL STRIP: ABNORMAL
NITRATE UR QL: NEGATIVE
PH UR STRIP: 5.5 PH (ref 5–7)
RBC #/AREA URNS AUTO: 0 /HPF (ref 0–2)
SP GR UR STRIP: >1.05 (ref 1–1.03)
SQUAMOUS #/AREA URNS AUTO: 7 /HPF (ref 0–1)
URN SPEC COLLECT METH UR: ABNORMAL
UROBILINOGEN UR STRIP-MCNC: 4 MG/DL (ref 0–2)
WBC #/AREA URNS AUTO: 21 /HPF (ref 0–2)

## 2017-08-02 PROCEDURE — 99207 ZZC NO CHARGE NURSE ONLY: CPT

## 2017-08-02 PROCEDURE — 87086 URINE CULTURE/COLONY COUNT: CPT | Performed by: PHYSICIAN ASSISTANT

## 2017-08-02 PROCEDURE — 97803 MED NUTRITION INDIV SUBSEQ: CPT | Performed by: DIETITIAN, REGISTERED

## 2017-08-02 PROCEDURE — 81001 URINALYSIS AUTO W/SCOPE: CPT | Performed by: PHYSICIAN ASSISTANT

## 2017-08-02 RX ORDER — ONDANSETRON 4 MG/1
4 TABLET, ORALLY DISINTEGRATING ORAL EVERY 6 HOURS PRN
Qty: 20 TABLET | Refills: 0 | Status: SHIPPED | OUTPATIENT
Start: 2017-08-02 | End: 2019-10-28

## 2017-08-02 RX ORDER — NITROFURANTOIN 25; 75 MG/1; MG/1
100 CAPSULE ORAL 2 TIMES DAILY
Qty: 14 CAPSULE | Refills: 0 | Status: SHIPPED | OUTPATIENT
Start: 2017-08-02 | End: 2020-12-08

## 2017-08-02 NOTE — MR AVS SNAPSHOT
After Visit Summary   8/2/2017    Shobha Rader    MRN: [id number]           Patient Information     Date Of Birth          1985        Visit Information        Provider Department      8/2/2017 9:00 AM Rn, Flory Meek, RN [facility]      Today's Diagnoses     Bariatric surgery status    -  1    Dysuria        Morbid obesity with BMI of 45.0-49.9, adult (H)        PONV (postoperative nausea and vomiting)        Nausea           Follow-ups after your visit        Your next 10 appointments already scheduled     Aug 21, 2017 10:00 AM CDT   Post Op with Flory Meek Diet 1, RD   [facility] ([facility])    [facility] [address] [id number] [phone]              Future tests that were ordered for you today     Open Future Orders        Priority Expected Expires Ordered    *UA reflex to Microscopic and Culture (Lola Russell and ANGELA) Routine  8/2/2018 8/2/2017            Who to contact     If you have questions or need follow up information about today's clinic visit or your schedule please contact [facility] directly at [phone].  Normal or non-critical lab and imaging results will be communicated to you by MyChart, letter or phone within 4 business days after the clinic has received the results. If you do not hear from us within 7 days, please contact the clinic through [facility] or phone. If you have a critical or abnormal lab result, we will notify you by phone as soon as possible.  Submit refill requests through [facility] or call your pharmacy and they will forward the refill request to us. Please allow 3 business days for your refill to be completed.          Additional Information About Your Visit        [employer] Information     [facility] gives you secure access to your electronic health record. If you see a primary  care provider, you can also send messages to your care team and make appointments. If you have questions, please call your primary care clinic.  If you do not have a primary care provider, please call [phone] and they will assist you.        Care EveryWhere ID     This is your Care EveryWhere ID. This could be used by other organizations to access your [place] medical records  [id number]        Your Vitals Were     Pulse Temperature Respirations Last Period Pulse Oximetry Breastfeeding?    100 97.3  F (36.3  C) 18 06/03/2017 (Exact Date) 98% No    BMI (Body Mass Index)                   43.2 kg/m2            Blood Pressure from Last 3 Encounters:   08/02/17 118/76   07/30/17 120/71   07/25/17 129/68    Weight from Last 3 Encounters:   08/02/17 284 lb 1.6 oz (128.9 kg)   07/30/17 297 lb (134.7 kg)   07/25/17 297 lb 12.8 oz (135.1 kg)                 Where to get your medicines      These medications were sent to [facility] [address] [place] [address] [address] [place] [address] AVE AT [facility]  [address] [facility] [address]     Phone:  [phone]     ondansetron 4 MG ODT tab          Primary Care Provider Office Phone # Fax #    Huy Smallwood [phone]       [address] [address]        Equal Access to Services     [place] AH: Hadii holger ku hadasho Soomaali, waaxda luqadaha, qaybta kaalmada adeegyada, daniel wakefield. So [facility] [phone].    ATENCIÓN: Si habla español, tiene a lassiter disposición servicios gratuitos de asistencia lingüística. Verito al [phone].    We comply with applicable federal civil rights laws and [place] laws. We do not discriminate on the basis of race, color, national origin, age, disability sex, sexual orientation or gender identity.            Thank you!     Thank you for choosing [facility]  for your care. Our goal  is always to provide you with excellent care. Hearing back from our patients is one way we can continue to improve our services. Please take a few minutes to complete the written survey that you may receive in the mail after your visit with us. Thank you!             Your Updated Medication List - Protect others around you: Learn how to safely use, store and throw away your medicines at www.disposemymeds.org.          This list is accurate as of: 8/2/17  2:14 PM.  Always use your most recent med list.                   Brand Name Dispense Instructions for use Diagnosis    albuterol 108 (90 BASE) MCG/ACT Inhaler    PROAIR HFA/PROVENTIL HFA/VENTOLIN HFA     Inhale 2 puffs into the lungs every 6 hours as needed        CALCIUM-VITAMIN D PO      Take 500 mg by mouth 3 times daily (with meals)        ENSKYCE 0.15-30 MG-MCG per tablet   Generic drug:  desogestrel-ethinyl estradiol      Take 1 tablet by mouth daily        FLINTSTONES COMPLETE PO      Take 2 tablets by mouth At Bedtime        fluticasone 50 MCG/ACT spray    FLONASE     Spray 1 spray into both nostrils daily as needed for rhinitis or allergies        ondansetron 4 MG ODT tab    ZOFRAN-ODT    20 tablet    Take 1 tablet (4 mg) by mouth every 6 hours as needed for nausea or vomiting    Morbid obesity with BMI of 45.0-49.9, adult (H), Bariatric surgery status, Nausea       ranitidine 75 MG tablet    ZANTAC    60 tablet    Take 1 tablet (75 mg) by mouth 2 times daily    Bariatric surgery status, Morbid obesity with BMI of 45.0-49.9, adult (H)       TYLENOL PO      Take 500 mg by mouth every 6 hours as needed for mild pain or fever        VITAMIN D (CHOLECALCIFEROL) PO      Take 2,000 Units by mouth daily

## 2017-08-02 NOTE — MR AVS SNAPSHOT
MRN:[id number]                      After Visit Summary   8/2/2017    Shobha Rader    MRN: [id number]           Visit Information        Provider Department      8/2/2017 9:30 AM 3, Flory Rubio RD [facility]      Your next 10 appointments already scheduled     Aug 21, 2017 10:00 AM CDT   Post Op with Flory Rubio 1, RD   [facility])    [address] [place] [id number] [phone]              MyChart Information     [employer] gives you secure access to your electronic health record. If you see a primary care provider, you can also send messages to your care team and make appointments. If you have questions, please call your primary care clinic.  If you do not have a primary care provider, please call [phone] and they will assist you.        Care EveryWhere ID     This is your Care EveryWhere ID. This could be used by other organizations to access your [place] medical records  [id number]        Equal Access to Services     [facility] : Hadii holger whitman Soayah, waaxda luqadaha, qaybta kaalmanoe balderas, daniel wakefield. So [facility] [phone].    ATENCIÓN: Si habla español, tiene a lassiter disposición servicios gratargentinaos de asistencia lingüística. Llame al [phone].    We comply with applicable federal civil rights laws and [place] laws. We do not discriminate on the basis of race, color, national origin, age, disability sex, sexual orientation or gender identity.

## 2017-08-02 NOTE — PROGRESS NOTES
BARIATRIC PROGRESS NOTE - 2 WEEK NUTRITION FOLLOW UP  DATE OF VISIT: 2017  Name: CANDI HERNANDEZ  : 1985  Gender: Female  MRN: [id number]  Age: 32  ASSESSMENT:  REASON FOR VISIT:  CANDI HERNANDEZ is a 32 year old Female presents today for a 2 week post op nutrition follow-up appointment.  DIAGNOSIS:  Status post Laparoscopic Taz-en-Y Gastric Bypass surgery.  ANTHROPOMETRICS:  Height: 68.75 inches  Weight: 284.1 lbs  BMI: 42.3 kg/m2    VITAMINS AND MINERALS:  Multivitamin - 2 [employer] Complete (PM)   Calcium - 600 mg BID  Vitamin D - not taking yet, will start  Vitamin B12 - not taking yet, will start  Iron - not taking yet, will start   NUTRITION HISTORY:  Tolerating diet: bariatric full liquid diet  Fluids/water intake: 52 ounces/day  Milk intake: 8 ounces/day  Small bites:Yes  1/2 cup meals:Yes  20-30 minute meals: Yes  Breakfast: yogurt  Lunch: tomato soup w/milk and melted cheese   Dinner: cream of chicken or cream of potato (strained and low fat)   Snacks: milk and protein drinks  Nausea: occasionally  Vomiting: never  PHYSICAL ACTIVITY:  Minimal; plans to start swimming, walking and biking  NUTRITION DIAGNOSIS: Altered gastrointestinal function related to alternation in gastrointestinal structure as evidenced by history of Laparoscopic Taz-en-Y Gastric Bypass.  IMPLEMENTATION:    Nutrition Prescription: Recommended bariatric pureed diet.  Goals:  Start puree diet at day 14 post op.  Increase to solids at day 28 post op.  Continue MVI, Start Calcium with vitamin D, B12, vitamin D, Iron and vitamin C.  Aim for 60 to 70 grams protein.  Continue 48 to 64 oz of fluid per day.  Implementation:  Discussed transition to puree diet.  Emphasized importance of adequate protein.  Evaluated and reviewed required vitamins and mineral supplements.  Verbalizes understanding of surgery diet guidelines.  NUTRITION MONITORING AND EVALUATION:  Monitor diet tolerance and weight loss.  4 weeks post op  Antcipated  Compliance: Good  Follow Up: Standard post-operative follow up at clinic  TIME SPENT WITH PATIENT: 20 minutes.  Ila Benson RD, LD  Clinical Dietitian

## 2017-08-02 NOTE — TELEPHONE ENCOUNTER
Received prior auth from  on [facility] in [place] for Ondansetron stating patient was only allowed 12 tabs and not 20 as ordered.  Patient had already picked up the 12 tabs.  Okay for the 12 as insurance allows at one time.  Nimco Anand, MS, RD, RN

## 2017-08-02 NOTE — PROGRESS NOTES
[facility]  2 Week Surgical Follow-Up     Current PCP:  Huy Smallwood  Surgeon: SHERRIE  HISTORY OF PRESENT ILLNESS:  Shobha Rader returns today for her follow-up appointment status post Surgery Type: Bypass DOS: 07/19/17.  She is accompanied by Self. Her daily fluid intake in oz is: 52 oz water, milk,   Feels well emotionally Yes.   Patient reports using: Alcohol - No     Cigarettes - No  Pain:    She is currently using Pain Meds: none for several days for pain relief. She rates her pain at a Pain Scale: 0 on the pain scale.  Refill Needed: No  Activity:  The patient is considered a fall risk: No.   What are you doing for physical activity? Activity: walking inside and outside  How many days per week?  7 days  How many minutes per day?  15-20  Patient plans to utilize exercise videos, weights, and treadmill.    Review of Systems:   GI:  Nausea: Yes occurs occasionally.  It is alleviated by. Zofran PRN and increasing fluids - patient aware needs to work on fluids  Vomiting: No    GERD: No  Diarrhea: No        Constipation: Yes   Patient's Last BM: am hard and light brown in color    Neuro/CV/Pulmonary:   Dizzy: No    Light Headed: Yes occurs infrequently most likely r/t fluid intake.  SOB: No   Not using CPAP - recommended to do so to remain healthy.  Chest Pain: No   Vascular:    LE Edema: No  Abimbola's Negative  :  Form of birth control is Contraception: BCP  Symptoms of UTI:  Dysuria: Yes has following symptoms: urgency, frequency, dark urine past 2-3 days. Will request UAUC per DIANA Hayes.  Endo: Diabetes: No  BS check (freq): na                  Avg. BS Level: na  PHYSICAL EXAMINATION:    VITALS:  /76 (BP Location: Right leg, Patient Position: Chair, Cuff Size: Adult Large)  Pulse 100  Temp 97.3  F (36.3  C)  Resp 18  Wt 284 lb 1.6 oz (128.9 kg)  LMP 06/03/2017 (Exact Date)  SpO2 98%  Breastfeeding? No  BMI 43.2 kg/m2   HR per patient normally always over 80. It is a bit  elevated today because she was rushing/walking lost in the hospital the last 10+ minutes to get here.                    LUNGS:  clear to auscultation  ABDOMEN: Abdomen soft, non-tender. BS normal. No masses, organomegaly  INCISION: dry and intact    MEDICATIONS/VITAMINS/ALLERGIES REVIEWED: Yes  ASA Hx: No    ASSESSMENT:    1.  DOS: 07/19/17 status post gastric bypass surgery.    INTERVENTIONS:      Steri strip removed?  No - already done prior to visit    PLAN:    Make follow up appointment to meet with psychologist and 1 month post operatively.   Follow up with your primary care provider to obesity related conditions by one month post op - most likely will see with next physical d/t lack of co-morbids..  Strive to drink 64 oz of fluid daily.  Call with questions or concerns at any time.  Return to clinic in 4 weeks for nutrition visit.  Return to clinic postop month 3.  Will request Zofran for nausea from LASHONDA givens for #20 4 mg Zofran ODT every 6 hours PRN nausea.  Patient aware of Zofran order.  UAUC ordered by RAMU Hayes.  Will inform patient re: UAUC results.    Guidelines provided re: how to increase activity/exercise?  Yes

## 2017-08-02 NOTE — TELEPHONE ENCOUNTER
Patient positive for UTI.  RAMU Hayes ordered Macrobid 1 twice daily for 7 days. No ETOH.  Patient informed re: order and no ETOH.  Patient verbalized understanding and is agreeable to plan.  Patient to call if antibiotic doesn't clear UTI or with further concerns/questions.  Encouraged to push fluids.  Patient verbalized understanding and is agreeable to plan.  Nimco Anand, MS, RD, RN

## 2017-08-03 LAB
BACTERIA SPEC CULT: NORMAL
MICRO REPORT STATUS: NORMAL
SPECIMEN SOURCE: NORMAL

## 2017-08-09 ENCOUNTER — TELEPHONE (OUTPATIENT)
Dept: SURGERY | Facility: CLINIC | Age: 32
End: 2017-08-09

## 2017-08-09 NOTE — TELEPHONE ENCOUNTER
Called patient to let her know that her urine culture did not show any abnormal bacterial growth.  She states she has gotten the symptoms of a uti in the past but the culture always comes back ok.  She denies any dysuria in the past 5 days.   Has mild nausea but thinks that could be from the meds.  Also has mild mid back pain but that is also not uncommon for her.  Otherwise no other complaints.

## 2017-08-21 ENCOUNTER — OFFICE VISIT (OUTPATIENT)
Dept: SURGERY | Facility: CLINIC | Age: 32
End: 2017-08-21
Payer: COMMERCIAL

## 2017-08-21 VITALS — BODY MASS INDEX: 40.32 KG/M2 | WEIGHT: 272.2 LBS | HEIGHT: 69 IN

## 2017-08-21 DIAGNOSIS — E66.01 OBESITY, CLASS III, BMI 40-49.9 (MORBID OBESITY) (H): ICD-10-CM

## 2017-08-21 PROCEDURE — 97803 MED NUTRITION INDIV SUBSEQ: CPT | Performed by: DIETITIAN, REGISTERED

## 2017-08-21 NOTE — PROGRESS NOTES
BARIATRIC PROGRESS NOTE - 4 Week Post Op  DATE OF VISIT: 17    Shobha Rader  1985  female  [id number]  32 year old    ASSESSMENT:    REASON FOR VISIT:  Shobha Rader is a 32 year old year old female presents today for a 4 Week Post Op nutrition follow-up appointment. Patient is accompanied by self      DIAGNOSIS:  Status: post gastric bypass surgery.  Obesity:  Obesity Grade III BMI >40    ANTHROPOMETRICS:  Height:  68.75  Initial weight: 308 lb   Current Weight:  272.2  BMI: 40.49  kg/(m^2).    VITAMINS AND MINERALS:   2 Multivitamin with Minerals-bed time  500 mg Calcium chew With Vitamin D 3 X per day with meals  2000 International units Vitamin D  1000 mcg Vitamin B-12, chew (Celebrate brand)  30 mg Iron + 60 mg vitamin C (Celebrate brand)    NUTRITION HISTORY:  Tolerating diet: Bariatric pureed diet  Fluids/water intake: 48 ounces/day  Milk intake: 4  ounces/day  Small bites: Yes  Portion size: 1/2-1 cup  20-30 minute meals: Yes  Fluids and meals  by 30 minutes: Yes  Chew foods thoroughly: Yes  Breakfast: poached egg mashed up  Lunch: 2 tablespoons refried beans or 2 table spoons cottage cheese  Dinner: broccoli cheese soup or rotisserie chicken blended with humus  Snacks: 4 oz skim milk  Nausea: occasionally   Vomitin times (baked fish mashed up and regular applesauce)  Additional Information: patient tried baked fish (mashed up) at a restaurant and vomited on ; since vomiting pt has gone back to only pureed foods and is only able to eat 1/3-1/2 cup per meal (see goals); stressed the importance of not advancing diet beyond recommened level      PHYSICAL ACTIVITY:  Type: walking  Frequency: 3 days a week.  Duration: 60 minutes.    DIAGNOSIS:   Previous Nutrition Diagnosis: Altered gastrointestinal function related to alternation in gastrointestinal structure as evidenced by history of gastric bypass.   No change    Previous Goals:  Start puree diet at day 14 post op-met  Increase  to solids at day 28 post op-not met/ started sooner  Continue MVI, Start Calcium with vitamin D, B12, vitamin D, Iron and vitamin C-met, but iron, vitamin C and vitamin  B-12 do not meet guidelines  Aim for 60 to 70 grams protein-not met  Continue 48 to 64 oz of fluid per day-met  Current Nutrition Diagnosis: Altered gastrointestinal function related to alternation in gastrointestinal structure as evidenced by history of gastric bypass.     INTERVENTION  Nutrition Prescription: Recommended bariatric puree diet (see goals below)    Goals:  Continue puree diet for 3 days; if tolerated at 1/3-1/2 cup per meal advance to soft textures   Continue MVI, calcium with vitamin D, vitamin B12 (switch to sublingual), vitamin D, and  iron with vitamin C (switch to product that meet clinic guidelines).  Aim for 60 to 90 grams protein.  Continue 48 to 64 oz of fluid per day.    Implementation:  Continuing puree diet for  3 days (see goals above).  Emphasized importance of adequate protein.  Reviewed required vitamins and mineral supplements.  Verbalizes fair-good understanding of surgery diet guidelines.  Assessed learning needs and learning preferences.      NUTRITION MONITORING AND EVALUATION:   Monitor diet tolerance and weight loss.      Anticipated Compliance: fair  Follow Up: Continue to monitor patient closely regarding weight loss and diet.  At 3 months Post Op    TIME SPENT WITH PATIENT: 25 minutes.    Eran Rodgers RD, LD  [facility]  [phone]

## 2017-08-21 NOTE — MR AVS SNAPSHOT
MRN:[id number]                      After Visit Summary   8/21/2017    Shobha Rader    MRN: [id number]           Visit Information        Provider Department      8/21/2017 10:00 AM 1, Flory Meek Diet, RD [facility]      Your next 10 appointments already scheduled     Oct 23, 2017 11:00 AM CDT   Return Visit with Blank Walker PA-C   [facility] [place] ([facility])    [address] [place] [id number] [phone]            Oct 23, 2017 11:30 AM CDT   Return Visit with Flory Wl Diet 1, RD   [facility] - [facility])    [facility] [address] [phone]              MyChart Information     [employer] gives you secure access to your electronic health record. If you see a primary care provider, you can also send messages to your care team and make appointments. If you have questions, please call your primary care clinic.  If you do not have a primary care provider, please call [phone] and they will assist you.        Care EveryWhere ID     This is your Care EveryWhere ID. This could be used by other organizations to access your [facility] medical records  [id number]        Equal Access to Services     [place] : Hadii holger carmona hadasho Somartitaali, waaxda luqadaha, qaybta kaalmada adedarrenyanoe, daniel wakefield. So [facility] [phone].    ATENCIÓN: Si habla español, tiene a lassiter disposición servicios gratuitos de asistencia lingüística. Llame al [phone].    We comply with applicable federal civil rights laws and [place] laws. We do not discriminate on the basis of race, color, national origin, age, disability sex, sexual orientation or gender identity.

## 2017-10-18 ENCOUNTER — TELEPHONE (OUTPATIENT)
Dept: SURGERY | Facility: CLINIC | Age: 32
End: 2017-10-18

## 2017-10-18 NOTE — TELEPHONE ENCOUNTER
Patient called in stating she had to leave [place] due to a medical emergency with a family member yesterday and left her zantac at home.  She thinks she will be gone for about 5 days.  Wanted to see if she could get more.  Pt is scheduled for her 3 month S/P gastric bypass appointment next week.  Apparently she has been taking zantac but sometimes forgets her second dose.  Reports NO heartburn symptoms.  Advised patient to do a trial off of the zantac.  This is usually recommended at a 3 month appointment anyway.  If necessary she can  a small amount at the store.  She denies any real heartburn issues before her bypass surgery.  Pt  In agreement with plan.  She would like to change the date for her follow up appointment due to this emergency trip.  Transferred to [place] to do this.

## 2017-11-06 ENCOUNTER — OFFICE VISIT (OUTPATIENT)
Dept: SURGERY | Facility: CLINIC | Age: 32
End: 2017-11-06
Payer: COMMERCIAL

## 2017-11-06 ENCOUNTER — HOSPITAL ENCOUNTER (OUTPATIENT)
Dept: LAB | Facility: CLINIC | Age: 32
Discharge: HOME | End: 2017-11-06
Attending: PHYSICIAN ASSISTANT | Admitting: PHYSICIAN ASSISTANT
Payer: COMMERCIAL

## 2017-11-06 VITALS
BODY MASS INDEX: 35.25 KG/M2 | WEIGHT: 237 LBS | DIASTOLIC BLOOD PRESSURE: 67 MMHG | SYSTOLIC BLOOD PRESSURE: 128 MMHG | HEART RATE: 49 BPM

## 2017-11-06 DIAGNOSIS — G47.33 OSA (OBSTRUCTIVE SLEEP APNEA): ICD-10-CM

## 2017-11-06 DIAGNOSIS — K91.2 POSTSURGICAL MALABSORPTION: ICD-10-CM

## 2017-11-06 DIAGNOSIS — Z98.84 BARIATRIC SURGERY STATUS: ICD-10-CM

## 2017-11-06 DIAGNOSIS — R73.03 PREDIABETES: ICD-10-CM

## 2017-11-06 DIAGNOSIS — Z98.84 BARIATRIC SURGERY STATUS: Primary | ICD-10-CM

## 2017-11-06 DIAGNOSIS — K59.09 OTHER CONSTIPATION: ICD-10-CM

## 2017-11-06 DIAGNOSIS — E66.9 OBESITY (BMI 30-39.9): ICD-10-CM

## 2017-11-06 PROBLEM — E66.01 MORBID OBESITY WITH BMI OF 45.0-49.9, ADULT (H): Status: RESOLVED | Noted: 2017-07-19 | Resolved: 2017-11-06

## 2017-11-06 PROBLEM — E66.01 OBESITY, CLASS III, BMI 40-49.9 (MORBID OBESITY) (H): Status: RESOLVED | Noted: 2017-01-11 | Resolved: 2017-11-06

## 2017-11-06 PROBLEM — E66.813 OBESITY, CLASS III, BMI 40-49.9 (MORBID OBESITY) (H): Status: RESOLVED | Noted: 2017-01-11 | Resolved: 2017-11-06

## 2017-11-06 LAB
DEPRECATED CALCIDIOL+CALCIFEROL SERPL-MC: 59 UG/L (ref 20–75)
FERRITIN SERPL-MCNC: 124 NG/ML (ref 12–150)
HBA1C MFR BLD: 5.2 % (ref 4.3–6)
IRON SATN MFR SERPL: 12 % (ref 15–46)
IRON SERPL-MCNC: 42 UG/DL (ref 35–180)
PTH-INTACT SERPL-MCNC: 68 PG/ML (ref 12–72)
TIBC SERPL-MCNC: 364 UG/DL (ref 240–430)
VIT B12 SERPL-MCNC: 872 PG/ML (ref 193–986)

## 2017-11-06 PROCEDURE — 97803 MED NUTRITION INDIV SUBSEQ: CPT | Performed by: DIETITIAN, REGISTERED

## 2017-11-06 PROCEDURE — 82728 ASSAY OF FERRITIN: CPT | Performed by: PHYSICIAN ASSISTANT

## 2017-11-06 PROCEDURE — 99214 OFFICE O/P EST MOD 30 MIN: CPT | Performed by: PHYSICIAN ASSISTANT

## 2017-11-06 PROCEDURE — 82306 VITAMIN D 25 HYDROXY: CPT | Performed by: PHYSICIAN ASSISTANT

## 2017-11-06 PROCEDURE — 83036 HEMOGLOBIN GLYCOSYLATED A1C: CPT | Performed by: PHYSICIAN ASSISTANT

## 2017-11-06 PROCEDURE — 36415 COLL VENOUS BLD VENIPUNCTURE: CPT | Performed by: PHYSICIAN ASSISTANT

## 2017-11-06 PROCEDURE — 83550 IRON BINDING TEST: CPT | Performed by: PHYSICIAN ASSISTANT

## 2017-11-06 PROCEDURE — 83970 ASSAY OF PARATHORMONE: CPT | Performed by: PHYSICIAN ASSISTANT

## 2017-11-06 PROCEDURE — 82607 VITAMIN B-12: CPT | Performed by: PHYSICIAN ASSISTANT

## 2017-11-06 PROCEDURE — 83540 ASSAY OF IRON: CPT | Performed by: PHYSICIAN ASSISTANT

## 2017-11-06 RX ORDER — FERROUS SULFATE 325(65) MG
TABLET ORAL
COMMUNITY

## 2017-11-06 NOTE — PATIENT INSTRUCTIONS
Consider Mirena IUD for birth control to help decrease periods so you can stop taking extra iron that causes constipation.    Have follow up labs drawn.    Return to clinic in 3 months.   Bowel Maintenance Program     Steps 1-4 can be used alone or in combination daily for the rest of your life without risk or rebound constipation.    1. Ducosate Sodium 1 pill twice daily (stool softener)    examples: Colace     2. Miralax 1 scoop/packet  up to 1-2 times as needed daily constipation     May take up to 1 week to work    3. Milk of Magnesia as needed 1x weekly.  Do not use daily!

## 2017-11-06 NOTE — PROGRESS NOTES
BARIATRIC FOLLOW UP VISIT     November 6, 2017       HISTORY OF PRESENT ILLNESS: Pt returns today for her follow-up appointment status post laparoscopic gastric bypass.     Initial Weight: 308 lb 8 oz (139.9 kg)   Current Weight: 237 lb (107.5 kg)  Cumulative weight loss (lbs): 71.5  Last Visits Weight: 308 lb (139.7 kg)     Patient is taking the following bariatric postoperative vitamins:  2 Complete multivitamins with minerals (at different times than calcium)   2000 International Units of Vitamin D daily  4713-2406 mg of Calcium daily in divided doses  1000 mcg of Vitamin B12 sl daily  1 Iron/Vit C. Daily- causing problems with constipation    Pt is not exercise.  She does have an exercise DVD that she did pre surgery that she is willing to restart.        OBESITY RELATED CONDITIONS:  Prediabetes- will check HgA1C today  Low back Pain  Heartburn-none  TMJ- minimal problems since surgery with prolonged mastication.   SAEID-has mild SAEID.  Didn't need in the the hospital.  Has not needed it following surgery.        SOCIAL HISTORY:  Pt denies smoking.  Pt denies alcohol use.  Avoids NSAID'S.      REVIEW OF SYSTEMS:     GI:  Nausea-none  Vomiting-none  Diarrhea-none  Constipation-taking 3 stool softeners daily and still has major problems when on iron.  Has to take a week off in order to has BM.  She gets about 50 oz of fluids on a good day.    Dysphagia-none  Abdominal Pain-occasional when constipated  Heartburn-none     SKIN:  Intertriginous irritation-improved.         PSYCH:  Depression- stable  Anxiety-stable    : has been spotting in between her menses  Has been having monthly menses  Is using BCP and condoms birth control.      PHYSICAL EXAMINATION:   /67  Pulse (!) 49  Wt 237 lb (107.5 kg)  BMI 35.25 kg/m2    GENERAL: Alert and oriented x3. NAD  HEART: No murmurs, rubs or gallops, Regular rate and rhythm  LUNGS: Breathing unlabored, Lung sounds clear to auscultation bilaterally  ABDOMEN: soft;  nontender; non distended, incision well healed. No hernia  EXTREMITIES: No LE edema bilaterally, Gait normal  SKIN: No intertriginous irritation or rash      ASSESSMENT AND PLAN:      3 months status laparoscopic gastric bypass   D/C Ranitidine    Obesity  Body mass index is 35.25 kg/(m^2).   Discussed adding exercise.  She does have an exercise DVD that she did pre surgery that she is willing to restart.       Post surgical malabsorption:  Ordered  vitamin B12, vitamin D, PTH, ferritin, TIBC, and iron labs.  Follow food plan per dietitian recommendations.  Continue taking recommended post-op vitamins.    Constipation  Continue Ducosate Sodium 3 pills daily   Consider Mirena IUD for birth control to help decrease periods so you can stop taking extra iron that causes constipation.  Miralax 1 scoop/packet  up to 1-2 times as needed daily constipation.      Pre-diabetes-will check HgA1C today    SAEID  Will discuss possible repeat sleep study and check in with sleep clinic at 6 month follow up visit    Return to clinic in 3 months.      I spent a total of 25 minutes face to face with Shobha during today's office visit. Over 50% of this time was spent counseling the patient and/or coordinating care.

## 2017-11-06 NOTE — MR AVS SNAPSHOT
After Visit Summary   11/6/2017    Shobha Rader    MRN: [id number]           Patient Information     Date Of Birth          1985        Visit Information        Provider Department      11/6/2017 10:30 AM Blank Walker PA-C [facility]      Today's Diagnoses     Bariatric surgery status    -  1    Obesity (BMI 30-39.9)        Postsurgical malabsorption        Prediabetes        TMJ dysfunction          Care Instructions    Consider Mirena IUD for birth control to help decrease periods so you can stop taking extra iron that causes constipation.    Have follow up labs drawn.    Return to clinic in 3 months.   Bowel Maintenance Program     Steps 1-4 can be used alone or in combination daily for the rest of your life without risk or rebound constipation.    1. Ducosate Sodium 1 pill twice daily (stool softener)    examples: Colace     2. Miralax 1 scoop/packet  up to 1-2 times as needed daily constipation     May take up to 1 week to work    3. Milk of Magnesia as needed 1x weekly.  Do not use daily!                  Follow-ups after your visit        Follow-up notes from your care team     Return in 3 months (on 2/6/2018).      Future tests that were ordered for you today     Open Future Orders        Priority Expected Expires Ordered    Hemoglobin A1c Routine  5/5/2018 11/6/2017    Parathyroid Hormone Intact Routine 11/6/2017 5/5/2018 11/6/2017    Vitamin D Screen Routine 11/6/2017 5/5/2018 11/6/2017    Iron and Iron Binding Capacity Routine 11/6/2017 5/5/2018 11/6/2017    Ferritin Routine 11/6/2017 5/5/2018 11/6/2017    Vitamin B12 Routine 11/6/2017 5/5/2018 11/6/2017            Who to contact     If you have questions or need follow up information about today's clinic visit or your schedule please contact [facility] directly at [phone].  Normal or non-critical lab and  imaging results will be communicated to you by [employer], letter or phone within 4 business days after the clinic has received the results. If you do not hear from us within 7 days, please contact the clinic through [employer] or phone. If you have a critical or abnormal lab result, we will notify you by phone as soon as possible.  Submit refill requests through [employer] or call your pharmacy and they will forward the refill request to us. Please allow 3 business days for your refill to be completed.          Additional Information About Your Visit        [employer] Information     [employer] gives you secure access to your electronic health record. If you see a primary care provider, you can also send messages to your care team and make appointments. If you have questions, please call your primary care clinic.  If you do not have a primary care provider, please call [phone] and they will assist you.        Care EveryWhere ID     This is your Care EveryWhere ID. This could be used by other organizations to access your [place] medical records  [id number]        Your Vitals Were     Pulse BMI (Body Mass Index)                49 35.25 kg/m2           Blood Pressure from Last 3 Encounters:   11/06/17 128/67   08/02/17 118/76   07/30/17 120/71    Weight from Last 3 Encounters:   11/06/17 237 lb (107.5 kg)   08/21/17 272 lb 3.2 oz (123.5 kg)   08/02/17 284 lb 1.6 oz (128.9 kg)              We Performed the Following     OP ROOMING NOTE TO HUNTER          Today's Medication Changes          These changes are accurate as of: 11/6/17 11:03 AM.  If you have any questions, ask your nurse or doctor.               Stop taking these medicines if you haven't already. Please contact your care team if you have questions.     ranitidine 75 MG tablet   Commonly known as:  ZANTAC   Stopped by:  Blank Walker PA-C                    Primary Care Provider Office Phone # Fax #    Huy Smallwood [phone]        [address] [address]        Equal Access to Services     DEWAYNEJESUS GENE : Hadii aad ku hadakuavalentina Vandanaali, wagregorioda lulaureenartieha, qaybta kajuan rda robertrose marienoe, daniel ajjuanchava wakefield. So [facility] [phone].    ATENCIÓN: Si habla español, tiene a lassiter disposición servicios gratuitos de asistencia lingüística. Verito al [phone].    We comply with applicable federal civil rights laws and [place] laws. We do not discriminate on the basis of race, color, national origin, age, disability, sex, sexual orientation, or gender identity.            Thank you!     Thank you for choosing [facility]  for your care. Our goal is always to provide you with excellent care. Hearing back from our patients is one way we can continue to improve our services. Please take a few minutes to complete the written survey that you may receive in the mail after your visit with us. Thank you!             Your Updated Medication List - Protect others around you: Learn how to safely use, store and throw away your medicines at www.disposemymeds.org.          This list is accurate as of: 11/6/17 11:03 AM.  Always use your most recent med list.                   Brand Name Dispense Instructions for use Diagnosis    albuterol 108 (90 BASE) MCG/ACT Inhaler    PROAIR HFA/PROVENTIL HFA/VENTOLIN HFA     Inhale 2 puffs into the lungs every 6 hours as needed        BIOTIN PO      Take 10,000 mcg by mouth daily        CALCIUM CITRATE + PO      Take 600 mg by mouth 2 times daily        COLACE PO      Take 300 mg by mouth daily        cyanocobalamin 1000 MCG Subl      Place 1,000 mcg under the tongue        ENSKYCE 0.15-30 MG-MCG per tablet   Generic drug:  desogestrel-ethinyl estradiol      Take 1 tablet by mouth daily        ferrous sulfate 325 (65 FE) MG tablet    IRON     Take by mouth daily (with breakfast)        FLINTSTONES COMPLETE PO      Take 2 tablets by mouth  At Bedtime        fluticasone 50 MCG/ACT spray    FLONASE     Spray 1 spray into both nostrils daily as needed for rhinitis or allergies        nitroFURantoin (macrocrystal-monohydrate) 100 MG capsule    MACROBID    14 capsule    Take 1 capsule (100 mg) by mouth 2 times daily    Bariatric surgery status, Urinary tract infection, site unspecified       ondansetron 4 MG ODT tab    ZOFRAN-ODT    20 tablet    Take 1 tablet (4 mg) by mouth every 6 hours as needed for nausea or vomiting    Morbid obesity with BMI of 45.0-49.9, adult (H), Bariatric surgery status, Nausea       TYLENOL PO      Take 500 mg by mouth every 6 hours as needed for mild pain or fever        VITAMIN C PO           VITAMIN D (CHOLECALCIFEROL) PO      Take 2,000 Units by mouth daily

## 2017-11-06 NOTE — PROGRESS NOTES
"NUTRITION POST OP APPOINTMENT  DATE OF VISIT: November 6, 2017    Shobha Rader  1985  female  [id number]  32 year old     ASSESSMENT:    REASON FOR VISIT:  Shobha is a 32 year old year old female presents today for 3 month PO nutrition follow-up appointment. Patient is accompanied by self.    DIAGNOSIS:  Status post gastric bypass surgery.  Obesity Grade II BMI 35-39.9     ANTHROPOMETRICS:  Initial Weight: 308 lbs  Height:  68.75\"  Current Weight:  237 lbs  BMI: 35.25  kg/(m^2).    VITAMINS AND MINERALS:  2 Multivitamin with Minerals  600 mg Calcium With Vitamin D TID  2000 International units Vitamin D  1000 mcg Vitamin B-12 sublingual  65 mg Iron  500 mg Vitamin C      NUTRITION HISTORY:  Breakfast: 2 hard cooked eggs or scrambled or Greek Yogurt  Lunch: 1 cup chili or 1/2 cup cottage cheese + lean deli meat  Supper: ~ 2 oz sea food, cooked broccoli or carrots, 1 bite of fresh fruit  Snacks: protein drink, skim milk  Fluids consumed: Water, 12 oz milk, 1/2 protein drink (10 g protein), decaf coffee, Crystal light  Consuming liquid calories: Yes  Protein intake: 60-70 grams/day  Tolerate regular texture food: Yes  Any foods not tolerated details: No  Portion size: 1/2-1 cup  Take 20-30 minutes to consume each meal: Yes   Eat protein foods first: Yes  Fluids and meals separate by at least 30 minutes: Yes  Chew foods thoroughly: Yes  Tolerating diet: Yes  Drinking high protein supplements: Yes  Consuming snacks per day: occasional string cheese  Additional Information: pt complains of constipation (1 stool every other day), but it is painful; had lengthy discussion about slowly increasing fiber intake and fluids; pt had many questions about eating specific foods (lettuce, nuts, avocado); emphasized avoiding calorically dense foods such as nuts and avocado; encouraged trying tender types of lettuce at 3 months post-op        PHYSICAL ACTIVITY:  Type: none (se goal with PA-C)      DIAGNOSIS:  Previous Nutrition " Diagnosis: Altered gastrointestinal function related to alteration in gastrointestinal structure as evidenced by history of gastric bypass surgery.- no change    Previous goals:  Continue puree diet for 3 days; if tolerated at 1/3-1/2 cup per meal advance to soft textures-met   Continue MVI, calcium with vitamin D, vitamin B12 (switch to sublingual), vitamin D, and  iron with vitamin C (switch to product that meet clinic guidelines)-met  Aim for 60 to 90 grams protein-met  Continue 48 to 64 oz of fluid per day- met (closer to low end)    Current Nutrition Diagnosis: Altered gastrointestinal function related to alteration in gastrointestinal structure as evidenced by history of gastric bypass surgery.    INTERVENTION:   Nutrition Prescription: Eat 3 meals a day at regular intervals. Consume 60-90 grams of protein daily. Follow post-surgical vitamin and mineral protocol.  Assessed learning needs and learning preferences.    GOALS:  Relating To Eating:  Gradually increase fiber intake to 10-15 grams of fiber per day  Include 3 different food groups per meal  Continue to practice all post- surgical diet guidelines      Relating to beverages:  Drink 64 oz fluid per day    Follow-Up:     Implementation: Discussed progress toward previous goals; reinforced importance of following bariatric lifestyle changes.    NUTRITION MONITORING AND EVALUATION:  Anticipated compliance: good  Verbalized good understanding.    Follow up: Patient to follow up in 3 months.    TIME SPENT WITH PATIENT:  25 minutes    Eran Rodgers RD, LD  [facility]  [phone]

## 2017-11-06 NOTE — MR AVS SNAPSHOT
MRN:[id number]                      After Visit Summary   11/6/2017    Shobha Rader    MRN: [id number]           Visit Information        Provider Department      11/6/2017 11:00 AM 1, Sh Fantasma Rubio, YUMI[facility] [place] [facility]      [facility] Information     [employer] gives you secure access to your electronic health record. If you see a primary care provider, you can also send messages to your care team and make appointments. If you have questions, please call your primary care clinic.  If you do not have a primary care provider, please call [phone] and they will assist you.        Care EveryWhere ID     This is your Care EveryWhere ID. This could be used by other organizations to access your [facility] medical records  [id number]        Equal Access to Services     CY MILLS : Andrea Tucker, washadi richard, kevin kaalskye balderas, daniel wakefield. So [facility] [phone].    ATENCIÓN: Si habla español, tiene a lassiter disposición servicios gratuitos de asistencia lingüística. Llame al [phone].    We comply with applicable federal civil rights laws and [place] laws. We do not discriminate on the basis of race, color, national origin, age, disability, sex, sexual orientation, or gender identity.

## 2017-11-12 ENCOUNTER — HEALTH MAINTENANCE LETTER (OUTPATIENT)
Age: 32
End: 2017-11-12

## 2018-01-23 ENCOUNTER — OFFICE VISIT (OUTPATIENT)
Dept: SURGERY | Facility: CLINIC | Age: 33
End: 2018-01-23
Payer: COMMERCIAL

## 2018-01-23 ENCOUNTER — HOSPITAL ENCOUNTER (OUTPATIENT)
Dept: LAB | Facility: CLINIC | Age: 33
Discharge: HOME | End: 2018-01-23
Attending: PHYSICIAN ASSISTANT | Admitting: PHYSICIAN ASSISTANT
Payer: COMMERCIAL

## 2018-01-23 VITALS
HEART RATE: 53 BPM | BODY MASS INDEX: 30.71 KG/M2 | SYSTOLIC BLOOD PRESSURE: 100 MMHG | WEIGHT: 206.44 LBS | DIASTOLIC BLOOD PRESSURE: 57 MMHG

## 2018-01-23 VITALS — WEIGHT: 206.7 LBS | BODY MASS INDEX: 30.62 KG/M2 | HEIGHT: 69 IN

## 2018-01-23 DIAGNOSIS — E66.9 OBESITY (BMI 30-39.9): ICD-10-CM

## 2018-01-23 DIAGNOSIS — K91.2 POSTSURGICAL MALABSORPTION: ICD-10-CM

## 2018-01-23 DIAGNOSIS — Z98.84 BARIATRIC SURGERY STATUS: ICD-10-CM

## 2018-01-23 DIAGNOSIS — L65.0 TELOGEN EFFLUVIUM: ICD-10-CM

## 2018-01-23 DIAGNOSIS — Z98.84 BARIATRIC SURGERY STATUS: Primary | ICD-10-CM

## 2018-01-23 DIAGNOSIS — I10 ORTHOSTATIC HYPERTENSION: ICD-10-CM

## 2018-01-23 PROBLEM — R73.03 PREDIABETES: Status: RESOLVED | Noted: 2017-11-06 | Resolved: 2018-01-23

## 2018-01-23 PROBLEM — G47.33 OSA (OBSTRUCTIVE SLEEP APNEA): Status: RESOLVED | Noted: 2017-04-20 | Resolved: 2018-01-23

## 2018-01-23 LAB
DEPRECATED CALCIDIOL+CALCIFEROL SERPL-MC: 60 UG/L (ref 20–75)
ERYTHROCYTE [DISTWIDTH] IN BLOOD BY AUTOMATED COUNT: 14.1 % (ref 10–15)
FERRITIN SERPL-MCNC: 151 NG/ML (ref 12–150)
HCT VFR BLD AUTO: 38.8 % (ref 35–47)
HGB BLD-MCNC: 13 G/DL (ref 11.7–15.7)
IRON SATN MFR SERPL: 20 % (ref 15–46)
IRON SERPL-MCNC: 75 UG/DL (ref 35–180)
MCH RBC QN AUTO: 30.2 PG (ref 26.5–33)
MCHC RBC AUTO-ENTMCNC: 33.5 G/DL (ref 31.5–36.5)
MCV RBC AUTO: 90 FL (ref 78–100)
PLATELET # BLD AUTO: 272 10E9/L (ref 150–450)
PTH-INTACT SERPL-MCNC: 43 PG/ML (ref 12–72)
RBC # BLD AUTO: 4.31 10E12/L (ref 3.8–5.2)
TIBC SERPL-MCNC: 384 UG/DL (ref 240–430)
WBC # BLD AUTO: 6.8 10E9/L (ref 4–11)

## 2018-01-23 PROCEDURE — 82728 ASSAY OF FERRITIN: CPT | Performed by: PHYSICIAN ASSISTANT

## 2018-01-23 PROCEDURE — 85027 COMPLETE CBC AUTOMATED: CPT | Performed by: PHYSICIAN ASSISTANT

## 2018-01-23 PROCEDURE — 83540 ASSAY OF IRON: CPT | Performed by: PHYSICIAN ASSISTANT

## 2018-01-23 PROCEDURE — 99214 OFFICE O/P EST MOD 30 MIN: CPT | Performed by: PHYSICIAN ASSISTANT

## 2018-01-23 PROCEDURE — 83970 ASSAY OF PARATHORMONE: CPT | Performed by: PHYSICIAN ASSISTANT

## 2018-01-23 PROCEDURE — 36415 COLL VENOUS BLD VENIPUNCTURE: CPT | Performed by: PHYSICIAN ASSISTANT

## 2018-01-23 PROCEDURE — 83550 IRON BINDING TEST: CPT | Performed by: PHYSICIAN ASSISTANT

## 2018-01-23 PROCEDURE — 82306 VITAMIN D 25 HYDROXY: CPT | Performed by: PHYSICIAN ASSISTANT

## 2018-01-23 PROCEDURE — 97803 MED NUTRITION INDIV SUBSEQ: CPT | Performed by: DIETITIAN, REGISTERED

## 2018-01-23 RX ORDER — DESOGESTREL AND ETHINYL ESTRADIOL 0.15-0.03
0.15 KIT ORAL DAILY
COMMUNITY
Start: 2018-01-02 | End: 2019-08-01

## 2018-01-23 NOTE — PROGRESS NOTES
"NUTRITION POST OP APPOINTMENT  DATE OF VISIT: January 23, 2018    Shobha Rader  1985  female  [id number]  32 year old     ASSESSMENT:    REASON FOR VISIT:  Shobha is a 32 year old year old female presents today for 6 month PO nutrition follow-up appointment. Patient is accompanied by son.    DIAGNOSIS:  Status post gastric bypass surgery.  Obesity Obesity Grade I BMI 30-34.9     ANTHROPOMETRICS:  Initial Weight: 308 lbs    Height: 174.6 cm (5' 8.75\")  Current Weight: 93.8 kg (206 lb 11.2 oz)   BMI: 30.81 kg/(m^2).    VITAMINS AND MINERALS:  2 Multivitamin with Minerals  600 mg Calcium With Vitamin D BID  2000 International units Vitamin D  1000 mcg Vitamin B-12 sublingual  daily Vitron C    NUTRITION HISTORY:  Breakfast: hard boiled eggs (2), sometimes oatmeal or raisin bran/fiber one cereal, yogurt   Lunch: 1/2 sandwich, shrimp in salad   Supper: breakfast enchiladas, burgers/sliders (doesn't eat all the bread)   Snacks: occasionally HS - cheesestick and popsicle to help with nausea after vitamins, occasional carrots and hummus, milk (16oz)  Fluids consumed: water (30oz)  sugar-free gatorade, milk (16oz), light lemonade, decaf coffee (4oz)     Consuming liquid calories: Yes  Protein intake: 60-90 grams/day  Tolerate regular texture food: Yes  Any foods not tolerated details: Yes  If any food not tolerated: pasta, sausage, nuts   Portion size: 1 cup or more (1.5c, typically)  Take 20-30 minutes to consume each meal: Yes   Eat protein foods first: Yes  Fluids and meals separate by at least 30 minutes: Yes  Chew foods thoroughly: Yes  Tolerating diet: Yes  Drinking high protein supplements: No  Consuming snacks per day: 1  Additional Information: Pt struggling with blackouts when coming from a laying position to sitting up, per PA-C suspect BP vs dietary component, pt will see PCP regarding this issue. Constipation continues but is overall improving. Discussed the role of hydration, fiber and physical activity " in bowel regularity. Discussed the importance of 1 cup portions      PHYSICAL ACTIVITY:  Type: gym or exercise video   Frequency (days per week): 1  Duration (min): 30    DIAGNOSIS:  Previous Nutrition Diagnosis: Altered gastrointestinal function related to alteration in gastrointestinal structure as evidenced by history of gastric bypass surgery.- no change    Previous goals:  Gradually increase fiber intake to 10-15 grams of fiber per day - improving  Include 3 different food groups per meal - improving  Continue to practice all post- surgical diet guidelines - not met (exceeding portions)  Drink 64 oz fluid per day - not met    Current Nutrition Diagnosis: Altered gastrointestinal function related to alteration in gastrointestinal structure as evidenced by history of gastric bypass surgery.    INTERVENTION:   Nutrition Prescription: Eat 3 meals a day at regular intervals. Consume 60-90 grams of protein daily. Follow post-surgical vitamin and mineral protocol.  Assessed learning needs and learning preferences.    GOALS:  Have at least 64oz of fluid each day  Have 10-15g fiber each day  Increase exercise; aim for 4x/week  Do not exceed 1 cup portions    Follow-Up:   Recommend annual follow up visits to assist with lifestyle changes or per insurance.  Implementation: Discussed progress toward previous goals; reinforced importance of following bariatric lifestyle changes.    NUTRITION MONITORING AND EVALUATION:  Anticipated compliance: good  Verbalized good understanding.    Follow up: Patient to follow up in 6 months, at 12 months post-op    TIME SPENT WITH PATIENT:  20 minutes    Ila Benson RD, LD  Clinical Dietitian

## 2018-01-23 NOTE — PATIENT INSTRUCTIONS
6 months status laparoscopic gastric bypass  Body mass index is 30.71 kg/(m^2)..  Post surgical malabsorption:                        Ordered CBC, vitamin B12, vitamin D, PTH, ferritin, TIBC, and iron labs.                        Follow food plan per dietitian recommendations.                        Continue taking recommended post-op vitamins. - including daily iron supplement  Orthostatic hypotension - Handout provided and disussed with patient.  Advised she see her primary regarding this.   Advised she strive for 64 + water daily.  Telogen effluvium - handout provided and discussion about continuing biotin and possibly Nioxin shampoo  Heartburn - resolved  Increasing cardio workouts to help promote further weight loss  Return to clinic in 6 months

## 2018-01-23 NOTE — MR AVS SNAPSHOT
MRN:[id number]                      After Visit Summary   1/23/2018    Shobha Rader    MRN: [id number]           Visit Information        Provider Department      1/23/2018 11:30 AM 3, Sh Fantasma Diet, RD [facility] [place] [facility]      Your next 10 appointments already scheduled     Jul 23, 2018 11:00 AM CDT   Annual Visit with Blank Walker PA-C   [facility] [place] ([facility])    [address] [place] [id number] [phone]            Jul 23, 2018 11:30 AM CDT   Annual Visit with Flory Meek Diet 1, RD   [facility] [place] ([facility])    [facility] [address] [id number] [phone]              MyChart Information     [employer] gives you secure access to your electronic health record. If you see a primary care provider, you can also send messages to your care team and make appointments. If you have questions, please call your primary care clinic.  If you do not have a primary care provider, please call [phone] and they will assist you.        Care EveryWhere ID     This is your Care EveryWhere ID. This could be used by other organizations to access your [facility] medical records  [id number]        Equal Access to Services     CY MILLS : Hadii holger carmona hadasho Somartitaali, waaxda luqadaha, qaybta kaalmada sherrie, daniel wakefield. So [facility] [phone].    ATENCIÓN: Si habla español, tiene a lassiter disposición servicios gratuitos de asistencia lingüística. Llame al [phone].    We comply with applicable federal civil rights laws and [place] laws. We do not discriminate on the basis of race, color, national origin, age, disability, sex, sexual orientation, or gender identity.

## 2018-01-23 NOTE — MR AVS SNAPSHOT
After Visit Summary   1/23/2018    Shobha Rader    MRN: [id number]           Patient Information     Date Of Birth          1985        Visit Information        Provider Department      1/23/2018 11:00 AM Abigail Salinas PA-C [facility] [place] [facility]      Today's Diagnoses     Bariatric surgery status    -  1    Obesity (BMI 30-39.9)        Postsurgical malabsorption        Telogen effluvium        Orthostatic hypertension          Care Instructions    6 months status laparoscopic gastric bypass  Body mass index is 30.71 kg/(m^2)..  Post surgical malabsorption:                        Ordered CBC, vitamin B12, vitamin D, PTH, ferritin, TIBC, and iron labs.                        Follow food plan per dietitian recommendations.                        Continue taking recommended post-op vitamins. - including daily iron supplement  Orthostatic hypotension - Handout provided and disussed with patient.  Advised she see her primary regarding this.   Advised she strive for 64 + water daily.  Telogen effluvium - handout provided and discussion about continuing biotin and possibly Nioxin shampoo  Heartburn - resolved  Increasing cardio workouts to help promote further weight loss  Return to clinic in 6 months          Follow-ups after your visit        Future tests that were ordered for you today     Open Future Orders        Priority Expected Expires Ordered    CBC with platelets Routine 1/23/2018 7/22/2018 1/23/2018    Iron Routine 1/23/2018 7/22/2018 1/23/2018    Iron and Iron Binding Capacity Routine 1/23/2018 7/22/2018 1/23/2018    Ferritin Routine 1/23/2018 7/22/2018 1/23/2018    Vitamin D Screen Routine 1/23/2018 7/22/2018 1/23/2018    Parathyroid Hormone Intact Routine 1/23/2018 7/22/2018 1/23/2018            Who to contact     If you have questions or need follow up information about today's clinic visit or your schedule please  contact [facility] directly at [phone].  Normal or non-critical lab and imaging results will be communicated to you by MyChart, letter or phone within 4 business days after the clinic has received the results. If you do not hear from us within 7 days, please contact the clinic through OneEye[employer] or phone. If you have a critical or abnormal lab result, we will notify you by phone as soon as possible.  Submit refill requests through [employer] or call your pharmacy and they will forward the refill request to us. Please allow 3 business days for your refill to be completed.          Additional Information About Your Visit        [employer] Information     [employer] gives you secure access to your electronic health record. If you see a primary care provider, you can also send messages to your care team and make appointments. If you have questions, please call your primary care clinic.  If you do not have a primary care provider, please call [phone] and they will assist you.        Care EveryWhere ID     This is your Care EveryWhere ID. This could be used by other organizations to access your [facility] medical records  [id number]        Your Vitals Were     Pulse BMI (Body Mass Index)                53 30.71 kg/m2           Blood Pressure from Last 3 Encounters:   01/23/18 100/57   11/06/17 128/67   08/02/17 118/76    Weight from Last 3 Encounters:   01/23/18 206 lb 11.2 oz (93.8 kg)   01/23/18 206 lb 7 oz (93.6 kg)   11/06/17 237 lb (107.5 kg)              We Performed the Following     OP ROOMING NOTE TO HUNTER        Primary Care Provider Office Phone # Fax #    Huy MIKAEL Smallwood [phone]       [address] [address]        Equal Access to Services     CY MILLS : Andrea Tucker, tereza richard, kevin balderas, daniel wakefield. So [facility] [phone].    ATENCIÓN: Ruben mckinely  español, tiene a lassiter disposición servicios gratuitos de asistencia lingüística. Verito salas [phone].    We comply with applicable federal civil rights laws and [place] laws. We do not discriminate on the basis of race, color, national origin, age, disability, sex, sexual orientation, or gender identity.            Thank you!     Thank you for choosing [facility]  for your care. Our goal is always to provide you with excellent care. Hearing back from our patients is one way we can continue to improve our services. Please take a few minutes to complete the written survey that you may receive in the mail after your visit with us. Thank you!             Your Updated Medication List - Protect others around you: Learn how to safely use, store and throw away your medicines at www.disposemymeds.org.          This list is accurate as of: 1/23/18 11:57 AM.  Always use your most recent med list.                   Brand Name Dispense Instructions for use Diagnosis    albuterol 108 (90 BASE) MCG/ACT Inhaler    PROAIR HFA/PROVENTIL HFA/VENTOLIN HFA     Inhale 2 puffs into the lungs every 6 hours as needed        BIOTIN PO      Take 10,000 mcg by mouth daily        CALCIUM CITRATE + PO      Take 600 mg by mouth 2 times daily        COLACE PO      Take 300 mg by mouth daily        cyanocobalamin 1000 MCG Subl      Place 1,000 mcg under the tongue        * ENSKYCE 0.15-30 MG-MCG per tablet   Generic drug:  desogestrel-ethinyl estradiol      Take 1 tablet by mouth daily        * APRI 0.15-30 MG-MCG per tablet   Generic drug:  desogestrel-ethinyl estradiol      Take 0.15 mg by mouth daily        ferrous sulfate 325 (65 FE) MG tablet    IRON     Take by mouth daily (with breakfast)        FLINTSTONES COMPLETE PO      Take 2 tablets by mouth At Bedtime        fluticasone 50 MCG/ACT spray    FLONASE     Spray 1 spray into both nostrils daily as needed for rhinitis or allergies        nitroFURantoin  (macrocrystal-monohydrate) 100 MG capsule    MACROBID    14 capsule    Take 1 capsule (100 mg) by mouth 2 times daily    Bariatric surgery status, Urinary tract infection, site unspecified       ondansetron 4 MG ODT tab    ZOFRAN-ODT    20 tablet    Take 1 tablet (4 mg) by mouth every 6 hours as needed for nausea or vomiting    Morbid obesity with BMI of 45.0-49.9, adult (H), Bariatric surgery status, Nausea       TYLENOL PO      Take 500 mg by mouth every 6 hours as needed for mild pain or fever        VITAMIN C PO           VITAMIN D (CHOLECALCIFEROL) PO      Take 2,000 Units by mouth daily        * Notice:  This list has 2 medication(s) that are the same as other medications prescribed for you. Read the directions carefully, and ask your doctor or other care provider to review them with you.

## 2018-01-23 NOTE — PROGRESS NOTES
"BARIATRIC FOLLOW UP VISIT     January 23, 2018       HISTORY OF PRESENT ILLNESS: Pt returns today for her follow-up appointment status post laparoscopic gastric bypass.  Patient has been experiencing near \"blackouts\".  Notices when she gets up from sitting to standing.  Not related to meals.  Blackout has happened twice.  One was at a retail store the day after thanksgiving and the other was getting up out of bed in the middle of the night about 3 weeks later.  Continues to get near \"blackouts\" about 3 times daily.and always when she is getting up from sitting. Feels like eyes go black for just a \"second\". This happened also before bariatric surgery when pregnant with her now 3 years old son.  Never was evaluated.  Her periods have been \"normal\" but still on the heavier side.  No other concerns today.    Initial Weight: 308 lb 8 oz (139.9 kg)   Current Weight: 206 lb 7 oz (93.6 kg)  Cumulative weight loss (lbs): 102.06  Last Visits Weight: 237 lb (107.5 kg)     Patient is taking the following bariatric postoperative vitamins:  2 Complete multivitamins with minerals (at different times than calcium)   2000 International Units of Vitamin D daily  9483-0730 mg of Calcium daily in divided doses  1000 mcgVitamin B12 sl daily  1 Iron/Vit C. daily    Pt is exercising once per week.  Goes to the [employer].  Walks on treadmill and bike and crunches. Thinking of doing a 5k this summer.         OBESITY RELATED CONDITIONS:  Prediabetic - resolved  Low back pain - slightly improved  Heartburn - none  SAEID - no symptoms        SOCIAL HISTORY:  Pt denies smoking.  Pt denies alcohol use.  Avoids NSAIDS.      REVIEW OF SYSTEMS:     GI:  Nausea- No  Vomiting- NO  Diarrhea- No  Constipation- Resolved.  Was on stool softener before.  Gets in about 50 oz water.  Dysphagia- No  Abdominal Pain- No  Heartburn- No     SKIN:  Intertriginous irritation-  Hair loss - significant.  Taking biotin for the past 3-4 months.  Haven't noticed much of " change       PSYCH:  Depression- yes.  Under good control  Anxiety- under good control      LABS/IMAGING/MEDICAL RECORDS REVIEW:  Labs from 3 months ago    PHYSICAL EXAMINATION:   /57  Pulse 53  Wt 206 lb 7 oz (93.6 kg)  BMI 30.71 kg/m2   Did orthostatic blood pressures on patient Lying down: 112/59 HR:45        Standin/68 HR:65        Standin/60  HR:66    GENERAL: Alert and oriented x3. NAD  HEART: No murmurs, rubs or gallops, Regular rate and rhythm  LUNGS: Breathing unlabored, Lung sounds clear to auscultation bilaterally  ABDOMEN: soft; nontender; nondistended, incision well healed. No hernia  EXTREMITIES: No LE edema bilaterally, Gait normal  SKIN: No intertriginous irritation or rash      ASSESSMENT AND PLAN:      6 months status laparoscopic gastric bypass  Morbid Obesity - resolved  Obesity - Body mass index is 30.71 kg/(m^2)..  Post surgical malabsorption:   Ordered CBC, vitamin B12, vitamin D, PTH, ferritin, TIBC, and iron labs.   Follow food plan per dietitian recommendations.   Continue taking recommended post-op vitamins. - including daily iron supplement  Orthostatic hypotension - Handout provided and disussed with patient.  Advised she see her primary regarding this.   Advised she strive for 64 + water daily.  Telogen effluvium - handout provided and discussion about continuing biotin and possibly Nioxin shampoo  Heartburn - resolved  Increasing cardio workouts to help promote further weight loss  Return to clinic in 6 months      I spent a total of 25 minutes face to face with Shobha during today's office visit. Over 50% of this time was spent counseling the patient and/or coordinating care.

## 2018-07-20 ENCOUNTER — OFFICE VISIT (OUTPATIENT)
Dept: SURGERY | Facility: CLINIC | Age: 33
End: 2018-07-20
Payer: COMMERCIAL

## 2018-07-20 ENCOUNTER — HOSPITAL ENCOUNTER (OUTPATIENT)
Dept: LAB | Facility: CLINIC | Age: 33
Discharge: HOME | End: 2018-07-20
Attending: PHYSICIAN ASSISTANT | Admitting: PHYSICIAN ASSISTANT
Payer: COMMERCIAL

## 2018-07-20 VITALS
BODY MASS INDEX: 25.33 KG/M2 | OXYGEN SATURATION: 100 % | HEART RATE: 64 BPM | SYSTOLIC BLOOD PRESSURE: 110 MMHG | DIASTOLIC BLOOD PRESSURE: 64 MMHG | WEIGHT: 171 LBS | RESPIRATION RATE: 12 BRPM | HEIGHT: 69 IN

## 2018-07-20 VITALS — HEIGHT: 69 IN | WEIGHT: 171 LBS | BODY MASS INDEX: 25.33 KG/M2

## 2018-07-20 DIAGNOSIS — Z98.84 BARIATRIC SURGERY STATUS: ICD-10-CM

## 2018-07-20 DIAGNOSIS — L65.0 TELOGEN EFFLUVIUM: ICD-10-CM

## 2018-07-20 DIAGNOSIS — K91.2 POSTSURGICAL MALABSORPTION: ICD-10-CM

## 2018-07-20 DIAGNOSIS — K95.89 IRON DEFICIENCY ANEMIA FOLLOWING BARIATRIC SURGERY: ICD-10-CM

## 2018-07-20 DIAGNOSIS — D50.8 IRON DEFICIENCY ANEMIA FOLLOWING BARIATRIC SURGERY: ICD-10-CM

## 2018-07-20 PROBLEM — E66.9 OBESITY (BMI 30-39.9): Status: RESOLVED | Noted: 2017-11-06 | Resolved: 2018-07-20

## 2018-07-20 LAB
FERRITIN SERPL-MCNC: 208 NG/ML (ref 12–150)
IRON SATN MFR SERPL: 28 % (ref 15–46)
IRON SERPL-MCNC: 100 UG/DL (ref 35–180)
PTH-INTACT SERPL-MCNC: 43 PG/ML (ref 18–80)
TIBC SERPL-MCNC: 362 UG/DL (ref 240–430)
VIT B12 SERPL-MCNC: 1235 PG/ML (ref 193–986)

## 2018-07-20 PROCEDURE — 83970 ASSAY OF PARATHORMONE: CPT | Performed by: PHYSICIAN ASSISTANT

## 2018-07-20 PROCEDURE — 82607 VITAMIN B-12: CPT | Performed by: PHYSICIAN ASSISTANT

## 2018-07-20 PROCEDURE — 99214 OFFICE O/P EST MOD 30 MIN: CPT | Performed by: PHYSICIAN ASSISTANT

## 2018-07-20 PROCEDURE — 97803 MED NUTRITION INDIV SUBSEQ: CPT | Performed by: DIETITIAN, REGISTERED

## 2018-07-20 PROCEDURE — 82728 ASSAY OF FERRITIN: CPT | Performed by: PHYSICIAN ASSISTANT

## 2018-07-20 PROCEDURE — 83550 IRON BINDING TEST: CPT | Performed by: PHYSICIAN ASSISTANT

## 2018-07-20 PROCEDURE — 36415 COLL VENOUS BLD VENIPUNCTURE: CPT | Performed by: PHYSICIAN ASSISTANT

## 2018-07-20 PROCEDURE — 82306 VITAMIN D 25 HYDROXY: CPT | Performed by: PHYSICIAN ASSISTANT

## 2018-07-20 PROCEDURE — 83540 ASSAY OF IRON: CPT | Performed by: PHYSICIAN ASSISTANT

## 2018-07-20 NOTE — PATIENT INSTRUCTIONS
1 years status laparoscopic gastric bypass  Morbid Obesity - resolved   Body mass index is 25.44 kg/(m^2)..  Post surgical malabsorption:   Reviewed recent CBC. Ordered vitamin B12, vitamin D, PTH, ferritin, TIBC,  and iron labs.   Follow food plan per dietitian recommendations.   Continue taking recommended post-op vitamins.  Iron deficiency anemia - Return to taking 1 iron supplement daily  Return to clinic in 1 year.

## 2018-07-20 NOTE — MR AVS SNAPSHOT
MRN:[id number]                      After Visit Summary   7/20/2018    Shobha Rader    MRN: [id number]           Visit Information        Provider Department      7/20/2018 2:30 PM 3, Sh Fantasma Rubio RD [facility] [place] [facility]      [facility] Information     [employer] gives you secure access to your electronic health record. If you see a primary care provider, you can also send messages to your care team and make appointments. If you have questions, please call your primary care clinic.  If you do not have a primary care provider, please call [phone] and they will assist you.        Care EveryWhere ID     This is your Care EveryWhere ID. This could be used by other organizations to access your [facility] medical records  [id number]        Equal Access to Services     CY MILLS : Andrea Tucker, washadi richard, kevin kaalskye balderas, daniel wakefield. So [facility] [phone].    ATENCIÓN: Si habla español, tiene a lassiter disposición servicios gratuitos de asistencia lingüística. Llame al [phone].    We comply with applicable federal civil rights laws and [place] laws. We do not discriminate on the basis of race, color, national origin, age, disability, sex, sexual orientation, or gender identity.

## 2018-07-20 NOTE — PROGRESS NOTES
BARIATRIC FOLLOW UP VISIT     July 20, 2018       HISTORY OF PRESENT ILLNESS: Pt returns today for her follow-up appointment status post laparoscopic gastric bypass.  Patient went to ED last week after 5 days of diarrhea.  Was given fluids and doing much better.  She is feeling awesome.  Has been at current weight for a while now.   Saw primary regarding black outs that were discussed at her 6 month PO visit.  Labs were done and primary recommended she move her feet before standing to get blood flow. This technique has worked and she has been doing much better.     Also has noticed she has been snacking during the day.  More out of boredom      Initial Weight: 308 lb (139.7 kg)   Current Weight: 171 lb (77.6 kg)  Cumulative weight loss (lbs): 137  Last Visits Weight: 206 lb (93.4 kg)     Patient is taking the following bariatric postoperative vitamins:  2 Complete multivitamins with minerals (at different times than calcium)   2000 International Units of Vitamin D daily  1200 mg of Calcium daily in divided doses  1000 mcg of Vitamin B12 sl daily  1 ferrous sulfate and vitamin C - every other day    Pt is exercising three days per week at the [facility].  Also doing some weight lifting       OBESITY RELATED CONDITIONS:  Prediabetic - resolved - Last A1C = 5.2 April 2014  Low back pain - slightly improved  Heartburn - none  SAEID - no symptoms        SOCIAL HISTORY:  Pt denies smoking.  Pt denies alcohol use.  Avoids NSAIDS.  Drinks 1 cup of coffee per day      REVIEW OF SYSTEMS:     GI:  Nausea- No  Vomiting- No  Diarrhea- No  Constipation- No.  16 oz milk, 24 oz water, 12 oz gatorade  Dysphagia- No  Abdominal Pain- No  Heartburn- No     SKIN:  Intertriginous irritation- none  Hair loss - improving       PSYCH:  Depression- No  Anxiety- No      LABS/IMAGING/MEDICAL RECORDS REVIEW: Reviewed CBC from 7/2017    PHYSICAL EXAMINATION:   /64 (BP Location: Right arm, Patient Position: Sitting, Cuff Size: Adult Regular)   "Pulse 64  Resp 12  Ht 5' 8.75\" (1.746 m)  Wt 171 lb (77.6 kg)  SpO2 100%  BMI 25.44 kg/m2    GENERAL: Alert and oriented x3. NAD  HEART: No murmurs, rubs or gallops, Regular rate and rhythm  LUNGS: Breathing unlabored, Lung sounds clear to auscultation bilaterally  ABDOMEN: soft; nontender; nondistended, incision well healed. No hernia  EXTREMITIES: No LE edema bilaterally, Gait normal  SKIN: No intertriginous irritation or rash      ASSESSMENT AND PLAN:      1 years status laparoscopic gastric bypass  Morbid Obesity - resolved   Body mass index is 25.44 kg/(m^2)..  Post surgical malabsorption:   Reviewed recent CBC. Ordered vitamin B12, vitamin D, PTH, ferritin, TIBC,  and iron labs.   Follow food plan per dietitian recommendations.   Continue taking recommended post-op vitamins.  Iron deficiency anemia - Return to taking 1 iron supplement daily  Discussed stopping caffeine  Return to clinic in 1 year.      I spent a total of 25 minutes face to face with Shobha during today's office visit. Over 50% of this time was spent counseling the patient and/or coordinating care.  "

## 2018-07-20 NOTE — PROGRESS NOTES
"NUTRITION POST OP APPOINTMENT  DATE OF VISIT: July 20, 2018    Shobha Rader  1985  female  [id number]  33 year old     ASSESSMENT:    REASON FOR VISIT:  Shobha is a 33 year old year old female presents today for 1 year PO nutrition follow-up appointment. Patient is accompanied by self.    DIAGNOSIS:  Status post gastric bypass surgery.  Obesity Overweight BMI 25-29.9     ANTHROPOMETRICS:  Initial Weight:  308lbs  Height: 68.75\"   Current Weight: 171 lbs    BMI: 25.44   kg/(m^2).    VITAMINS AND MINERALS:  2 Multivitamin with Minerals  600 mg Calcium With Vitamin D BID  2000 International units Vitamin D  1000 mcg Vitamin B-12 sublingual  EOD Vitron C    NUTRITION HISTORY:  Breakfast: (within 1 hour of waking) raisin bran OR yogurt and banana  Lunch: 1/2 sandwich and fruit OR leftovers  Supper: burgers, brats, tuna casserol  Snacks: fat free wheat thins, baby bell cheese, carrots and hummus, fat free cheeze its, fruit  (grazing), popsicles (100% fruit, or SF)   Fluids consumed: Water (25oz), milk (16oz), G2 (12oz), Coffee (8oz)  Consuming liquid calories: Yes  Protein intake: 60-90 grams/day  Tolerate regular texture food: Yes  Any foods not tolerated details: Yes  If any food not tolerated: high fat foods, tough meat   Portion size: 1-2c  Take 20-30 minutes to consume each meal: Yes (usually)  Eat protein foods first: Yes  Fluids and meals separate by at least 30 minutes: Yes  Chew foods thoroughly: Yes  Tolerating diet: Yes  Drinking high protein supplements: No  Consuming snacks per day: grazes  Additional Information: Pt feeling well but admits she has gotten off track with portions and snacking. Discussed appropriate calorie ranges for weight maintenance. Wants to get pregnant soon; PA-C encouraged wait 4 more months. Discussed pregnancy nutrition needs and provided pregnancy handout. Encouraged pt to make appointment for more detailed discussion when/if pregnancy confirmed.       PHYSICAL ACTIVITY:  Type: " running, walking, weight lifting  Frequency (days per week): 3-4  Duration (min): 30-60    DIAGNOSIS:  Previous Nutrition Diagnosis: Altered gastrointestinal function related to alteration in gastrointestinal structure as evidenced by history of gastric bypass surgery.- no change    Previous goals:  Have at least 64oz of fluid each day - improving  Have 10-15g fiber each day - met  Increase exercise; aim for 4x/week - improving  Do not exceed 1 cup portions - not met    Current Nutrition Diagnosis: Altered gastrointestinal function related to alteration in gastrointestinal structure as evidenced by history of gastric bypass surgery.    INTERVENTION:   Nutrition Prescription: Eat 3 meals a day at regular intervals. Consume 60-90 grams of protein daily. Follow post-surgical vitamin and mineral protocol.  Assessed learning needs and learning preferences.    GOALS:  Do not exceed 1 cup per meal  Avoid snacking  Aim for 600-800kcals for weight loss, 800-1000 for weight maintenance    Follow-Up:   Recommend annual follow up visits to assist with lifestyle changes or per insurance.  Implementation: Discussed progress toward previous goals; reinforced importance of following bariatric lifestyle changes.    NUTRITION MONITORING AND EVALUATION:  Anticipated compliance: good  Verbalized good understanding.    Follow up: Patient to follow up in 12 months.    TIME SPENT WITH PATIENT:25 minutes    Ila Benson RD, LD  Clinical Dietitian

## 2018-07-20 NOTE — MR AVS SNAPSHOT
After Visit Summary   7/20/2018    Shobha Rader    MRN: [id number]           Patient Information     Date Of Birth          1985        Visit Information        Provider Department      7/20/2018 2:00 PM Abigail Salinas PA-C [facility]      Today's Diagnoses     BMI 25.0-25.9,adult    -  1    Bariatric surgery status        Postsurgical malabsorption        Telogen effluvium        Iron deficiency anemia following bariatric surgery          Care Instructions    1 years status laparoscopic gastric bypass  Morbid Obesity - resolved   Body mass index is 25.44 kg/(m^2)..  Post surgical malabsorption:   Reviewed recent CBC. Ordered vitamin B12, vitamin D, PTH, ferritin, TIBC,  and iron labs.   Follow food plan per dietitian recommendations.   Continue taking recommended post-op vitamins.  Iron deficiency anemia - Return to taking 1 iron supplement daily  Return to clinic in 1 year.            Follow-ups after your visit        Future tests that were ordered for you today     Open Future Orders        Priority Expected Expires Ordered    Vitamin B12 Routine 7/20/2018 7/20/2019 7/20/2018    Vitamin D Screen Routine 7/20/2018 7/20/2019 7/20/2018    Parathyroid Hormone Intact Routine 7/20/2018 7/20/2019 7/20/2018    Iron Routine 7/20/2018 7/20/2019 7/20/2018    Iron and Iron Binding Capacity Routine 7/20/2018 7/20/2019 7/20/2018    Ferritin Routine 7/20/2018 7/20/2019 7/20/2018            Who to contact     If you have questions or need follow up information about today's clinic visit or your schedule please contact [facility] directly at [phone].  Normal or non-critical lab and imaging results will be communicated to you by MyChart, letter or phone within 4 business days after the clinic has received the results. If you do not hear from us within 7 days, please contact the clinic  "through MTailort or phone. If you have a critical or abnormal lab result, we will notify you by phone as soon as possible.  Submit refill requests through Questra or call your pharmacy and they will forward the refill request to us. Please allow 3 business days for your refill to be completed.          Additional Information About Your Visit        TrenStarhart Information     Questra gives you secure access to your electronic health record. If you see a primary care provider, you can also send messages to your care team and make appointments. If you have questions, please call your primary care clinic.  If you do not have a primary care provider, please call [phone] and they will assist you.        Care EveryWhere ID     This is your Care EveryWhere ID. This could be used by other organizations to access your [facility] medical records  [id number]        Your Vitals Were     Pulse Respirations Height Pulse Oximetry BMI (Body Mass Index)       64 12 5' 8.75\" (1.746 m) 100% 25.44 kg/m2        Blood Pressure from Last 3 Encounters:   07/20/18 110/64   01/23/18 100/57   11/06/17 128/67    Weight from Last 3 Encounters:   07/20/18 171 lb (77.6 kg)   01/23/18 206 lb 11.2 oz (93.8 kg)   01/23/18 206 lb 7 oz (93.6 kg)               Primary Care Provider Office Phone # Fax #    Huy Smallwood [phone]       [address] [address]        Equal Access to Services     [facility] Center[facility] AH: Hadii aad ku hadasho Soomaali, waaxda luqadaha, qaybta kaalmada adeegyada, waxay idiin hayaan robert dumont la'patricia . So [facility] [phone].    ATENCIÓN: Si habla español, tiene a lassiter disposición servicios gratuitos de asistencia lingüística. Llame al [phone].    We comply with applicable federal civil rights laws and [place] laws. We do not discriminate on the basis of race, color, national origin, age, disability, sex, sexual orientation, or gender identity.            Thank you!  "    Thank you for choosing [facility]  for your care. Our goal is always to provide you with excellent care. Hearing back from our patients is one way we can continue to improve our services. Please take a few minutes to complete the written survey that you may receive in the mail after your visit with us. Thank you!             Your Updated Medication List - Protect others around you: Learn how to safely use, store and throw away your medicines at www.disposemymeds.org.          This list is accurate as of 7/20/18  2:38 PM.  Always use your most recent med list.                   Brand Name Dispense Instructions for use Diagnosis    albuterol 108 (90 Base) MCG/ACT Inhaler    PROAIR HFA/PROVENTIL HFA/VENTOLIN HFA     Inhale 2 puffs into the lungs every 6 hours as needed        BIOTIN PO      Take 10,000 mcg by mouth daily        CALCIUM CITRATE + PO      Take 600 mg by mouth 2 times daily        COLACE PO      Take 300 mg by mouth daily        cyanocobalamin 1000 MCG Subl      Place 1,000 mcg under the tongue        * ENSKYCE 0.15-30 MG-MCG per tablet   Generic drug:  desogestrel-ethinyl estradiol      Take 1 tablet by mouth daily        * APRI 0.15-30 MG-MCG per tablet   Generic drug:  desogestrel-ethinyl estradiol      Take 0.15 mg by mouth daily        ferrous sulfate 325 (65 Fe) MG tablet    IRON     Take by mouth daily (with breakfast)        FLINTSTONES COMPLETE PO      Take 2 tablets by mouth At Bedtime        fluticasone 50 MCG/ACT spray    FLONASE     Spray 1 spray into both nostrils daily as needed for rhinitis or allergies        nitroFURantoin (macrocrystal-monohydrate) 100 MG capsule    MACROBID    14 capsule    Take 1 capsule (100 mg) by mouth 2 times daily    Bariatric surgery status, Urinary tract infection, site unspecified       ondansetron 4 MG ODT tab    ZOFRAN-ODT    20 tablet    Take 1 tablet (4 mg) by mouth every 6 hours as needed for nausea or vomiting     Morbid obesity with BMI of 45.0-49.9, adult (H), Bariatric surgery status, Nausea       TYLENOL PO      Take 500 mg by mouth every 6 hours as needed for mild pain or fever        VITAMIN C PO           VITAMIN D (CHOLECALCIFEROL) PO      Take 2,000 Units by mouth daily        * Notice:  This list has 2 medication(s) that are the same as other medications prescribed for you. Read the directions carefully, and ask your doctor or other care provider to review them with you.

## 2018-07-22 LAB — DEPRECATED CALCIDIOL+CALCIFEROL SERPL-MC: 62 UG/L (ref 20–75)

## 2018-07-27 DIAGNOSIS — K91.2 POSTSURGICAL MALABSORPTION: ICD-10-CM

## 2018-07-27 DIAGNOSIS — Z98.84 BARIATRIC SURGERY STATUS: Primary | ICD-10-CM

## 2018-08-27 ENCOUNTER — TELEPHONE (OUTPATIENT)
Dept: SURGERY | Facility: CLINIC | Age: 33
End: 2018-08-27

## 2018-08-27 DIAGNOSIS — R10.13 EPIGASTRIC PAIN: Primary | ICD-10-CM

## 2018-08-27 DIAGNOSIS — Z98.84 BARIATRIC SURGERY STATUS: ICD-10-CM

## 2018-08-27 RX ORDER — SUCRALFATE ORAL 1 G/10ML
1 SUSPENSION ORAL 4 TIMES DAILY
Qty: 400 ML | Refills: 0 | Status: SHIPPED | OUTPATIENT
Start: 2018-08-27 | End: 2018-09-06

## 2018-08-27 RX ORDER — PANTOPRAZOLE SODIUM 40 MG/1
40 TABLET, DELAYED RELEASE ORAL DAILY
Qty: 30 TABLET | Refills: 0 | Status: SHIPPED | OUTPATIENT
Start: 2018-08-27 | End: 2018-09-17

## 2018-08-27 NOTE — TELEPHONE ENCOUNTER
Spoke with Dr. Melara and LUDWIG:    Protonix 40 mg daily #30 NR  Carafate 1 gm 4 times daily for 10 days NR.  See in clinic in 1 month.  Stop coffee.    Spoke with patient and informed her re: above.  Patient states will be seen and will call back for appointment.  Don't take Pepto bismol while on Protonix or Carafate.   Call if sx don't resolve with treatment.   Get to ED if intense pain returns or vomits blood or in stool, increase in HR or feels faint.   Okay to have colonoscopy week because will not interfere with upper GI sx.  Patient verbalized understanding and is agreeable to plan.  Nimco Anand, MS, RD, RN

## 2018-08-27 NOTE — TELEPHONE ENCOUNTER
"Patient had RNY 7/19/17.  She was seen in clinic for her yearly 7/20/18.    At that time she has been seen in the ED 5 days prior to the visit for gastrointestinal symptoms of loose stools and not GI pain.     Patient reports 1 month ago (about 2 weeks after her visit here) she had pain in her stomach that felt like knives and hot.  She went to [facility] in [place] and was given MS and pain went away in a couple of days.    States the pain came back on Thursday 8/23/18.  Pain 7/10.  Pain occurs if eats a bit more than usual the night before.  But mostly when stomach empty - feels \"hot and like knives poking\" her in the stomach.  Nausea with intense pain only.  Didn't go to the ED this past week.  Instead she tried PeptoBismol and pain went away instantly.    Drinking a lot of coffee in the past - notices flares it up a lot more.  She has been really trying to avoid.    Informed patient I would speak with her surgeon and give her a call back.  Patient verbalized understanding and is agreeable to plan.      Patient's pharmacy [facility] in [facility] at intersection of [address].  Her number is [phone].    Nimco Anand, MS, RD, RN          "

## 2018-08-29 ENCOUNTER — TELEPHONE (OUTPATIENT)
Dept: SURGERY | Facility: CLINIC | Age: 33
End: 2018-08-29

## 2018-09-17 ENCOUNTER — OFFICE VISIT (OUTPATIENT)
Dept: SURGERY | Facility: CLINIC | Age: 33
End: 2018-09-17
Payer: COMMERCIAL

## 2018-09-17 VITALS
DIASTOLIC BLOOD PRESSURE: 64 MMHG | BODY MASS INDEX: 25.46 KG/M2 | HEART RATE: 78 BPM | SYSTOLIC BLOOD PRESSURE: 102 MMHG | OXYGEN SATURATION: 99 % | HEIGHT: 69 IN | RESPIRATION RATE: 12 BRPM | WEIGHT: 171.9 LBS

## 2018-09-17 DIAGNOSIS — Z98.84 BARIATRIC SURGERY STATUS: ICD-10-CM

## 2018-09-17 DIAGNOSIS — R10.13 EPIGASTRIC PAIN: ICD-10-CM

## 2018-09-17 PROCEDURE — 99213 OFFICE O/P EST LOW 20 MIN: CPT | Performed by: PHYSICIAN ASSISTANT

## 2018-09-17 RX ORDER — PANTOPRAZOLE SODIUM 40 MG/1
40 TABLET, DELAYED RELEASE ORAL DAILY
Qty: 30 TABLET | Refills: 0 | Status: SHIPPED | OUTPATIENT
Start: 2018-09-17 | End: 2019-10-28

## 2018-09-17 NOTE — PROGRESS NOTES
"BARIATRIC FOLLOW UP VISIT     September 17, 2018    HISTORY OF PRESENT ILLNESS: Pt returns today for a 3 week follow up regarding her abdominal pain  She had  laparoscopic gastric bypass surgery on 7/19/17.  She was seen in clinic for her yearly 7/20/18.    About 2 weeks after her visit here she had pain in her stomach that felt like knives and hot. She went to [facility] in [place] and was given MS and pain went away in a couple of days. CT scan showed gallstones but no signs or symptoms of cholecystitis.   States the pain came back on Thursday 8/23/18 and was 7/10. She had been drinking regular coffee and noticed the pain worse when doing this. Didn't go to the ED this past week.  Thought she might had a ulcer.  She tried Pepto Bismol and pain went away instantly.  Called clinic on 8/27/18.  Ulcer suspected.  Dr. Melara prescribed PPI and sucralfate. Has not had RUQ ultrasound.     Started on Protonix 40 mg daily #30 NR  Sucralfate 1 gm 4 times daily for 10 days NR.  Stop coffee.  Pain subsided and has not returned.      Initial Weight: 308 lb 12.8 oz (140.1 kg)   Current Weight: 171 lb 14.4 oz (78 kg)  Cumulative weight loss (lbs): 136.9  Last Visits Weight: 171 lb (77.6 kg)     SOCIAL HISTORY:  Pt denies smoking.  Pt denies alcohol use.  Avoids NSAIDS.  Avoiding caffeine.     REVIEW OF SYSTEMS:     GI:  Abdominal Pain-none  Heartburn-resolved    LABS/IMAGING/MEDICAL RECORDS REVIEW:   [facility] Visit 8/7/18    PHYSICAL EXAMINATION:   /64 (BP Location: Left arm, Patient Position: Sitting, Cuff Size: Adult Large)  Pulse 78  Resp 12  Ht 5' 8.75\" (1.746 m)  Wt 171 lb 14.4 oz (78 kg)  SpO2 99%  BMI 25.57 kg/m2    GENERAL: Alert and oriented x3. NAD  HEART: No murmurs, rubs or gallops, Regular rate and rhythm  LUNGS: Breathing unlabored, Lung sounds clear to auscultation bilaterally  ABDOMEN:   soft; nontender (No epigastric pain.); nondistended, incision well healed. No hernia  EXTREMITIES:  Gait " normal    ASSESSMENT AND PLAN:      1 year s/p laparoscopic gastric bypass surgery    Body mass index is 25.57 kg/(m^2)..  Post surgical malabsorption  Anastomotic Ulcer    Continue Protonix for full 8 week course.   Mychart if symptoms return.   Continue current vitamins.  Continue to avoids NSAIDS and caffeine.   Return to clinic in for annual appt.       I spent a total of 20  minutes face to face with Shobha during today's office visit. Over 50% of this time was spent counseling the patient and/or coordinating care.

## 2018-09-17 NOTE — MR AVS SNAPSHOT
"              After Visit Summary   9/17/2018    Shobha Rader    MRN: [id number]           Patient Information     Date Of Birth          1985        Visit Information        Provider Department      9/17/2018 10:00 AM Blank Walker PA-C [facility]      Today's Diagnoses     Epigastric pain        Bariatric surgery status           Follow-ups after your visit        Who to contact     If you have questions or need follow up information about today's clinic visit or your schedule please contact [facility] directly at [phone].  Normal or non-critical lab and imaging results will be communicated to you by Publictivityhart, letter or phone within 4 business days after the clinic has received the results. If you do not hear from us within 7 days, please contact the clinic through [employer] or phone. If you have a critical or abnormal lab result, we will notify you by phone as soon as possible.  Submit refill requests through Vamo or call your pharmacy and they will forward the refill request to us. Please allow 3 business days for your refill to be completed.          Additional Information About Your Visit        MyChart Information     Vamo gives you secure access to your electronic health record. If you see a primary care provider, you can also send messages to your care team and make appointments. If you have questions, please call your primary care clinic.  If you do not have a primary care provider, please call [phone] and they will assist you.        Care EveryWhere ID     This is your Care EveryWhere ID. This could be used by other organizations to access your [facility] medical records  [id number]        Your Vitals Were     Pulse Respirations Height Pulse Oximetry BMI (Body Mass Index)       78 12 5' 8.75\" (1.746 m) 99% 25.57 kg/m2        Blood Pressure from Last 3 Encounters: "   09/17/18 102/64   07/20/18 110/64   01/23/18 100/57    Weight from Last 3 Encounters:   09/17/18 171 lb 14.4 oz (78 kg)   07/20/18 171 lb (77.6 kg)   07/20/18 171 lb (77.6 kg)              Today, you had the following     No orders found for display         Where to get your medicines      These medications were sent to [id number] - [place], [place] - [address] AT [facility] [address]  [address], [facility] [address]     Phone:  [phone]     pantoprazole 40 MG EC tablet          Primary Care Provider Office Phone # Fax #    Huy Smallwood [phone]       [address] [address]        Equal Access to Services     JESUS MILLS : Hadii holger carmona hadasho Soomaali, waaxda luqadaha, qaybta kaalmada adeegyada, daniel becker haypatricia maradiaga . So [facility] [phone].    ATENCIÓN: Si habla español, tiene a lassiter disposición servicios gratuitos de asistencia lingüística. Verito al [phone].    We comply with applicable federal civil rights laws and [place] laws. We do not discriminate on the basis of race, color, national origin, age, disability, sex, sexual orientation, or gender identity.            Thank you!     Thank you for choosing [facility]  for your care. Our goal is always to provide you with excellent care. Hearing back from our patients is one way we can continue to improve our services. Please take a few minutes to complete the written survey that you may receive in the mail after your visit with us. Thank you!             Your Updated Medication List - Protect others around you: Learn how to safely use, store and throw away your medicines at www.disposemymeds.org.          This list is accurate as of 9/17/18  4:33 PM.  Always use your most recent med list.                   Brand Name Dispense Instructions for use Diagnosis    albuterol 108 (90 Base)  MCG/ACT inhaler    PROAIR HFA/PROVENTIL HFA/VENTOLIN HFA     Inhale 2 puffs into the lungs every 6 hours as needed        BIOTIN PO      Take 10,000 mcg by mouth daily        CALCIUM CITRATE + PO      Take 600 mg by mouth 2 times daily        COLACE PO      Take 300 mg by mouth daily        cyanocobalamin 1000 MCG Subl      Place 1,000 mcg under the tongue        * ENSKYCE 0.15-30 MG-MCG per tablet   Generic drug:  desogestrel-ethinyl estradiol      Take 1 tablet by mouth daily        * APRI 0.15-30 MG-MCG per tablet   Generic drug:  desogestrel-ethinyl estradiol      Take 0.15 mg by mouth daily        ferrous sulfate 325 (65 Fe) MG tablet    IRON     Take by mouth daily (with breakfast)        FLINTSTONES COMPLETE PO      Take 2 tablets by mouth At Bedtime        fluticasone 50 MCG/ACT spray    FLONASE     Spray 1 spray into both nostrils daily as needed for rhinitis or allergies        nitroFURantoin (macrocrystal-monohydrate) 100 MG capsule    MACROBID    14 capsule    Take 1 capsule (100 mg) by mouth 2 times daily    Bariatric surgery status, Urinary tract infection, site unspecified       ondansetron 4 MG ODT tab    ZOFRAN-ODT    20 tablet    Take 1 tablet (4 mg) by mouth every 6 hours as needed for nausea or vomiting    Morbid obesity with BMI of 45.0-49.9, adult (H), Bariatric surgery status, Nausea       pantoprazole 40 MG EC tablet    PROTONIX    30 tablet    Take 1 tablet (40 mg) by mouth daily    Epigastric pain, Bariatric surgery status       TYLENOL PO      Take 500 mg by mouth every 6 hours as needed for mild pain or fever        VITAMIN C PO           VITAMIN D (CHOLECALCIFEROL) PO      Take 2,000 Units by mouth daily        * Notice:  This list has 2 medication(s) that are the same as other medications prescribed for you. Read the directions carefully, and ask your doctor or other care provider to review them with you.

## 2018-09-24 ENCOUNTER — TELEPHONE (OUTPATIENT)
Dept: SURGERY | Facility: CLINIC | Age: 33
End: 2018-09-24

## 2018-09-24 DIAGNOSIS — Z98.84 BARIATRIC SURGERY STATUS: ICD-10-CM

## 2018-09-24 DIAGNOSIS — K28.9 ANASTOMOTIC ULCER: Primary | ICD-10-CM

## 2018-09-24 RX ORDER — SUCRALFATE ORAL 1 G/10ML
1 SUSPENSION ORAL 4 TIMES DAILY
Qty: 400 ML | Refills: 1 | Status: SHIPPED | OUTPATIENT
Start: 2018-09-24 | End: 2019-10-28

## 2018-09-24 NOTE — TELEPHONE ENCOUNTER
"Patient called to report that she feels her ulcer is flaring up.  States last night she had a hot dog at a bonfire. Feels this may have been too high in fat for her. Knows it didn't get stuck.  Reports that the pain 6/10 started this morning.  Same spot as it was last time - right above \"belly button\".    States no coffee, NSAID's, ASA, caffeine, or ETOH.  Reports that other times noticed stomach pain due to caffeine - cut that out.   Feeling wonderful prior to last night.    Still has GB.    States she is still taking Protonix 40 mg daily but liquid Carafate is gone.    Feels she may be pregnant but needs to confirm with MD.  Meds will need to be adjusted for pregnancy.    Informed patient I would get her message to RAMU Parson and I would get back to her today.    Patient states same pharmacy.  Informed patient she may need to be seen for pregnancy.  Patient verbalized understanding and is agreeable to plan.  Nimco Anand, MS, RD, RN        "

## 2018-09-24 NOTE — TELEPHONE ENCOUNTER
Spoke with patient and informed her re:   Continue Protonix. (Category B)  Carafate 1 gm 4 times daily for 10 days NR. (Category B)  See in clinic in 3-4 weeks both diet and PA-C.     Remember to take her 2 Flinstones Complete MVI these have everything she needs for bariatric surgery and baby.    Discussed that hormones could cause flare up.  May also consider checking for GB issues if continues as sometimes GB doesn't follow normal pain path.  Patient verbalized understanding and is agreeable to plan.  Patient verbalized understanding and is agreeable to plan.  Nimco Anand, MS, RD, RN

## 2018-09-24 NOTE — TELEPHONE ENCOUNTER
Continue Protonix. (Category B)  Carafate 1 gm 4 times daily for 10 days NR. (Category B)  See in clinic in 3-4 weeks both diet and PA-C.    Remember to take her 2 Flinstones Complete MVI these have everything she needs for bariatric surgery and baby.

## 2018-12-08 DIAGNOSIS — K27.9 PUD (PEPTIC ULCER DISEASE): Primary | ICD-10-CM

## 2018-12-08 RX ORDER — SUCRALFATE ORAL 1 G/10ML
1 SUSPENSION ORAL 4 TIMES DAILY
Qty: 420 ML | Refills: 1 | Status: SHIPPED | OUTPATIENT
Start: 2018-12-08 | End: 2020-12-08

## 2019-01-25 LAB
ABO + RH BLD: NORMAL
ABO + RH BLD: NORMAL
BLD GP AB SCN SERPL QL: NORMAL
HBV SURFACE AG SERPL QL IA: NORMAL
HIV 1+2 AB+HIV1 P24 AG SERPL QL IA: NORMAL
RUBELLA ANTIBODY IGG QUANTITATIVE: NORMAL IU/ML
TREPONEMA ANTIBODIES: NORMAL

## 2019-05-28 DIAGNOSIS — K27.9 PUD (PEPTIC ULCER DISEASE): ICD-10-CM

## 2019-05-31 NOTE — TELEPHONE ENCOUNTER
Called pt at PA request to remind of annual appointment.  Pt states she is ready to make annual appointment. Transferred to .  Sirisha Akhtar RN on 5/31/2019 at 8:22 AM

## 2019-06-02 NOTE — TELEPHONE ENCOUNTER
Spoke with patient Monday 8/27/18.  At that time was having epigastric pain and treated for ulcer or gastritis suspect from caffeine use.  Treatment was with 40 mg Protonix daily and 1 gm Carafate 4 times daily for 10 days.    Today patient reports that she has not yet started the above meds due to prepping for a colonoscopy.  She has her colonoscopy tomorrow and is hoping to start the meds tomorrow afternoon.    Reports that at this time she has minimal pain, which she states she is thankful for.  States she will call clinic if pain doesn't get better and was able to verbalize as to when to get to the ED.  Be seen in 1 month.  Nimco Anand, MS, RD, RN     no

## 2019-06-28 DIAGNOSIS — K27.9 PUD (PEPTIC ULCER DISEASE): ICD-10-CM

## 2019-07-16 LAB — GROUP B STREP PCR: NORMAL

## 2019-07-21 ENCOUNTER — HOSPITAL ENCOUNTER (OUTPATIENT)
Facility: CLINIC | Age: 34
Discharge: HOME HEALTH | End: 2019-07-21
Attending: OBSTETRICS & GYNECOLOGY | Admitting: OBSTETRICS & GYNECOLOGY
Payer: COMMERCIAL

## 2019-07-21 VITALS
BODY MASS INDEX: 32.58 KG/M2 | HEIGHT: 68 IN | SYSTOLIC BLOOD PRESSURE: 120 MMHG | WEIGHT: 215 LBS | DIASTOLIC BLOOD PRESSURE: 64 MMHG | RESPIRATION RATE: 16 BRPM | TEMPERATURE: 98 F

## 2019-07-21 PROBLEM — O26.90 PREGNANCY RELATED CONDITION: Status: ACTIVE | Noted: 2019-07-21

## 2019-07-21 PROCEDURE — 59025 FETAL NON-STRESS TEST: CPT

## 2019-07-21 PROCEDURE — G0463 HOSPITAL OUTPT CLINIC VISIT: HCPCS | Mod: 25

## 2019-07-21 RX ORDER — ONDANSETRON 2 MG/ML
4 INJECTION INTRAMUSCULAR; INTRAVENOUS EVERY 6 HOURS PRN
Status: DISCONTINUED | OUTPATIENT
Start: 2019-07-21 | End: 2019-07-22 | Stop reason: HOSPADM

## 2019-07-21 ASSESSMENT — MIFFLIN-ST. JEOR: SCORE: 1733.73

## 2019-07-22 NOTE — PLAN OF CARE
2130-Dr. Alamo in department, updated on contractions frequency/intensity, SVE 1-2/60/-2, received VO to repeat SVE in 1 hr, ok to discharge if no change

## 2019-07-22 NOTE — DISCHARGE INSTRUCTIONS
Discharge Instruction for Undelivered Patients      You were seen for: Labor Assessment  We Consulted: Dr Alamo  You had (Test or Medicine):NST, SVE     Diet:   Drink 8 to 12 glasses of liquids (milk, juice, water) every day.  You may eat meals and snacks.     Activity:  Count fetal kicks everyday (see handout)  Call your doctor or nurse midwife if your baby is moving less than usual.     Call your provider if you notice:  Swelling in your face or increased swelling in your hands or legs.  Headaches that are not relieved by Tylenol (acetaminophen).  Changes in your vision (blurring: seeing spots or stars.)  Nausea (sick to your stomach) and vomiting (throwing up).   Weight gain of 5 pounds or more per week.  Heartburn that doesn't go away.  Signs of bladder infection: pain when you urinate (use the toilet), need to go more often and more urgently.  The bag of gupta (rupture of membranes) breaks, or you notice leaking in your underwear.  Bright red blood in your underwear.  Abdominal (lower belly) or stomach pain.  Second (plus) baby: Contractions (tightening) less than 10 minutes apart and getting stronger.  Increase or change in vaginal discharge (note the color and amount)      Follow-up:  As scheduled in the clinic

## 2019-07-22 NOTE — PLAN OF CARE
"2100: Pt arrived ambulatory from home accompanied by spouse. Verbal consent obtained for EFM monitors. VS taken WNL. Pt is a , 37+1,  with ob hx of macrosomia and precipitous deliveries with inductions. Medical hx: asthma, gastric bypass. She c/o contractions since Wednesday with increasing intensity this evening. She also reports some vaginal spotting. Denies leaking of fluid. Pt's contractions 6-9 minutes apart. She rates them a 2/10 and considers the feeling more \"tightening\" than \"pain\".     RN SVE: /2.    : Dr Alamo in department and updated to patients arrival and SVE. She recommends rechecking her cervix in 1 hour and if unchanged, pt can discharge home.    : Pt SVE unchanged. Pt states her contractions \"have lightened since arriving here\" Discharge instructions prepared.    : Discharge instructions reviewed with pt and spouse. RN encouraged pt to call MAC or MD, or to come back if her contractions become more regular and painful.    : Pt left the unit ambulatory        "

## 2019-08-01 RX ORDER — SODIUM CHLORIDE, SODIUM LACTATE, POTASSIUM CHLORIDE, CALCIUM CHLORIDE 600; 310; 30; 20 MG/100ML; MG/100ML; MG/100ML; MG/100ML
INJECTION, SOLUTION INTRAVENOUS CONTINUOUS
Status: CANCELLED | OUTPATIENT
Start: 2019-08-01

## 2019-08-01 RX ORDER — CARBOPROST TROMETHAMINE 250 UG/ML
250 INJECTION, SOLUTION INTRAMUSCULAR
Status: CANCELLED | OUTPATIENT
Start: 2019-08-01

## 2019-08-01 RX ORDER — IBUPROFEN 400 MG/1
800 TABLET, FILM COATED ORAL
Status: CANCELLED | OUTPATIENT
Start: 2019-08-01

## 2019-08-01 RX ORDER — ACETAMINOPHEN 325 MG/1
650 TABLET ORAL EVERY 4 HOURS PRN
Status: CANCELLED | OUTPATIENT
Start: 2019-08-01

## 2019-08-01 RX ORDER — LIDOCAINE 40 MG/G
CREAM TOPICAL
Status: CANCELLED | OUTPATIENT
Start: 2019-08-01

## 2019-08-01 RX ORDER — NALOXONE HYDROCHLORIDE 0.4 MG/ML
.1-.4 INJECTION, SOLUTION INTRAMUSCULAR; INTRAVENOUS; SUBCUTANEOUS
Status: CANCELLED | OUTPATIENT
Start: 2019-08-01

## 2019-08-01 RX ORDER — ONDANSETRON 2 MG/ML
4 INJECTION INTRAMUSCULAR; INTRAVENOUS EVERY 6 HOURS PRN
Status: CANCELLED | OUTPATIENT
Start: 2019-08-01

## 2019-08-01 RX ORDER — METHYLERGONOVINE MALEATE 0.2 MG/ML
200 INJECTION INTRAVENOUS
Status: CANCELLED | OUTPATIENT
Start: 2019-08-01

## 2019-08-01 RX ORDER — OXYTOCIN 10 [USP'U]/ML
10 INJECTION, SOLUTION INTRAMUSCULAR; INTRAVENOUS
Status: CANCELLED | OUTPATIENT
Start: 2019-08-01

## 2019-08-01 RX ORDER — OXYTOCIN/0.9 % SODIUM CHLORIDE 30/500 ML
1-24 PLASTIC BAG, INJECTION (ML) INTRAVENOUS CONTINUOUS
Status: CANCELLED | OUTPATIENT
Start: 2019-08-01

## 2019-08-01 RX ORDER — OXYTOCIN/0.9 % SODIUM CHLORIDE 30/500 ML
100-340 PLASTIC BAG, INJECTION (ML) INTRAVENOUS CONTINUOUS PRN
Status: CANCELLED | OUTPATIENT
Start: 2019-08-01

## 2019-08-01 RX ORDER — OXYCODONE AND ACETAMINOPHEN 5; 325 MG/1; MG/1
1 TABLET ORAL
Status: CANCELLED | OUTPATIENT
Start: 2019-08-01

## 2019-08-03 ENCOUNTER — ANESTHESIA (OUTPATIENT)
Dept: OBGYN | Facility: CLINIC | Age: 34
End: 2019-08-03
Payer: COMMERCIAL

## 2019-08-03 ENCOUNTER — HOSPITAL ENCOUNTER (INPATIENT)
Facility: CLINIC | Age: 34
LOS: 1 days | Discharge: HOME | End: 2019-08-04
Attending: OBSTETRICS & GYNECOLOGY | Admitting: OBSTETRICS & GYNECOLOGY
Payer: COMMERCIAL

## 2019-08-03 ENCOUNTER — ANESTHESIA EVENT (OUTPATIENT)
Dept: OBGYN | Facility: CLINIC | Age: 34
End: 2019-08-03
Payer: COMMERCIAL

## 2019-08-03 LAB
ABO + RH BLD: NORMAL
ABO + RH BLD: NORMAL
SPECIMEN EXP DATE BLD: NORMAL
T PALLIDUM AB SER QL: NONREACTIVE

## 2019-08-03 PROCEDURE — 25000128 H RX IP 250 OP 636: Performed by: PHYSICIAN ASSISTANT

## 2019-08-03 PROCEDURE — 25000128 H RX IP 250 OP 636: Performed by: ANESTHESIOLOGY

## 2019-08-03 PROCEDURE — 25800030 ZZH RX IP 258 OP 636: Performed by: PHYSICIAN ASSISTANT

## 2019-08-03 PROCEDURE — 25000125 ZZHC RX 250: Performed by: ANESTHESIOLOGY

## 2019-08-03 PROCEDURE — 27110038 ZZH RX 271: Performed by: ANESTHESIOLOGY

## 2019-08-03 PROCEDURE — 37000011 ZZH ANESTHESIA WARD SERVICE

## 2019-08-03 PROCEDURE — 36415 COLL VENOUS BLD VENIPUNCTURE: CPT | Performed by: PHYSICIAN ASSISTANT

## 2019-08-03 PROCEDURE — 86780 TREPONEMA PALLIDUM: CPT | Performed by: PHYSICIAN ASSISTANT

## 2019-08-03 PROCEDURE — 12000035 ZZH R&B POSTPARTUM

## 2019-08-03 PROCEDURE — 86901 BLOOD TYPING SEROLOGIC RH(D): CPT | Performed by: PHYSICIAN ASSISTANT

## 2019-08-03 PROCEDURE — 00HU33Z INSERTION OF INFUSION DEVICE INTO SPINAL CANAL, PERCUTANEOUS APPROACH: ICD-10-PCS | Performed by: ANESTHESIOLOGY

## 2019-08-03 PROCEDURE — 86900 BLOOD TYPING SEROLOGIC ABO: CPT | Performed by: PHYSICIAN ASSISTANT

## 2019-08-03 PROCEDURE — 3E0R3BZ INTRODUCTION OF ANESTHETIC AGENT INTO SPINAL CANAL, PERCUTANEOUS APPROACH: ICD-10-PCS | Performed by: ANESTHESIOLOGY

## 2019-08-03 PROCEDURE — 0KQM0ZZ REPAIR PERINEUM MUSCLE, OPEN APPROACH: ICD-10-PCS | Performed by: OBSTETRICS & GYNECOLOGY

## 2019-08-03 PROCEDURE — 10907ZC DRAINAGE OF AMNIOTIC FLUID, THERAPEUTIC FROM PRODUCTS OF CONCEPTION, VIA NATURAL OR ARTIFICIAL OPENING: ICD-10-PCS | Performed by: OBSTETRICS & GYNECOLOGY

## 2019-08-03 PROCEDURE — 72200001 ZZH LABOR CARE VAGINAL DELIVERY SINGLE

## 2019-08-03 PROCEDURE — 25000132 ZZH RX MED GY IP 250 OP 250 PS 637: Performed by: OBSTETRICS & GYNECOLOGY

## 2019-08-03 RX ORDER — OXYTOCIN 10 [USP'U]/ML
10 INJECTION, SOLUTION INTRAMUSCULAR; INTRAVENOUS
Status: DISCONTINUED | OUTPATIENT
Start: 2019-08-03 | End: 2019-08-04 | Stop reason: HOSPADM

## 2019-08-03 RX ORDER — OXYTOCIN/0.9 % SODIUM CHLORIDE 30/500 ML
340 PLASTIC BAG, INJECTION (ML) INTRAVENOUS CONTINUOUS PRN
Status: DISCONTINUED | OUTPATIENT
Start: 2019-08-03 | End: 2019-08-04 | Stop reason: HOSPADM

## 2019-08-03 RX ORDER — DOCUSATE SODIUM 100 MG/1
300 CAPSULE, LIQUID FILLED ORAL DAILY PRN
Status: DISCONTINUED | OUTPATIENT
Start: 2019-08-03 | End: 2019-08-04 | Stop reason: HOSPADM

## 2019-08-03 RX ORDER — ONDANSETRON 4 MG/1
4 TABLET, ORALLY DISINTEGRATING ORAL EVERY 6 HOURS PRN
Status: DISCONTINUED | OUTPATIENT
Start: 2019-08-03 | End: 2019-08-03

## 2019-08-03 RX ORDER — AMOXICILLIN 250 MG
2 CAPSULE ORAL 2 TIMES DAILY
Status: DISCONTINUED | OUTPATIENT
Start: 2019-08-03 | End: 2019-08-04 | Stop reason: HOSPADM

## 2019-08-03 RX ORDER — IBUPROFEN 400 MG/1
800 TABLET, FILM COATED ORAL
Status: DISCONTINUED | OUTPATIENT
Start: 2019-08-03 | End: 2019-08-03

## 2019-08-03 RX ORDER — ONDANSETRON 2 MG/ML
4 INJECTION INTRAMUSCULAR; INTRAVENOUS EVERY 6 HOURS PRN
Status: DISCONTINUED | OUTPATIENT
Start: 2019-08-03 | End: 2019-08-03

## 2019-08-03 RX ORDER — ALBUTEROL SULFATE 90 UG/1
2 AEROSOL, METERED RESPIRATORY (INHALATION)
Status: DISCONTINUED | OUTPATIENT
Start: 2019-08-03 | End: 2019-08-04 | Stop reason: HOSPADM

## 2019-08-03 RX ORDER — OXYTOCIN/0.9 % SODIUM CHLORIDE 30/500 ML
100 PLASTIC BAG, INJECTION (ML) INTRAVENOUS CONTINUOUS
Status: DISCONTINUED | OUTPATIENT
Start: 2019-08-03 | End: 2019-08-04 | Stop reason: HOSPADM

## 2019-08-03 RX ORDER — OXYTOCIN/0.9 % SODIUM CHLORIDE 30/500 ML
1-24 PLASTIC BAG, INJECTION (ML) INTRAVENOUS CONTINUOUS
Status: DISCONTINUED | OUTPATIENT
Start: 2019-08-03 | End: 2019-08-03

## 2019-08-03 RX ORDER — OXYCODONE AND ACETAMINOPHEN 5; 325 MG/1; MG/1
1 TABLET ORAL
Status: DISCONTINUED | OUTPATIENT
Start: 2019-08-03 | End: 2019-08-03

## 2019-08-03 RX ORDER — OXYTOCIN/0.9 % SODIUM CHLORIDE 30/500 ML
100-340 PLASTIC BAG, INJECTION (ML) INTRAVENOUS CONTINUOUS PRN
Status: DISCONTINUED | OUTPATIENT
Start: 2019-08-03 | End: 2019-08-03

## 2019-08-03 RX ORDER — FENTANYL CITRATE 50 UG/ML
100 INJECTION, SOLUTION INTRAMUSCULAR; INTRAVENOUS ONCE
Status: COMPLETED | OUTPATIENT
Start: 2019-08-03 | End: 2019-08-03

## 2019-08-03 RX ORDER — ROPIVACAINE HYDROCHLORIDE 2 MG/ML
10 INJECTION, SOLUTION EPIDURAL; INFILTRATION; PERINEURAL ONCE
Status: COMPLETED | OUTPATIENT
Start: 2019-08-03 | End: 2019-08-03

## 2019-08-03 RX ORDER — NALOXONE HYDROCHLORIDE 0.4 MG/ML
.1-.4 INJECTION, SOLUTION INTRAMUSCULAR; INTRAVENOUS; SUBCUTANEOUS
Status: DISCONTINUED | OUTPATIENT
Start: 2019-08-03 | End: 2019-08-04 | Stop reason: HOSPADM

## 2019-08-03 RX ORDER — LANOLIN 100 %
OINTMENT (GRAM) TOPICAL
Status: DISCONTINUED | OUTPATIENT
Start: 2019-08-03 | End: 2019-08-04 | Stop reason: HOSPADM

## 2019-08-03 RX ORDER — ACETAMINOPHEN 325 MG/1
650 TABLET ORAL EVERY 4 HOURS PRN
Status: DISCONTINUED | OUTPATIENT
Start: 2019-08-03 | End: 2019-08-03

## 2019-08-03 RX ORDER — ASCORBIC ACID 500 MG
1000 TABLET ORAL DAILY
Status: DISCONTINUED | OUTPATIENT
Start: 2019-08-03 | End: 2019-08-04 | Stop reason: HOSPADM

## 2019-08-03 RX ORDER — AMOXICILLIN 250 MG
1 CAPSULE ORAL 2 TIMES DAILY
Status: DISCONTINUED | OUTPATIENT
Start: 2019-08-03 | End: 2019-08-04 | Stop reason: HOSPADM

## 2019-08-03 RX ORDER — LIDOCAINE HYDROCHLORIDE AND EPINEPHRINE 15; 5 MG/ML; UG/ML
3 INJECTION, SOLUTION EPIDURAL
Status: COMPLETED | OUTPATIENT
Start: 2019-08-03 | End: 2019-08-03

## 2019-08-03 RX ORDER — LIDOCAINE 40 MG/G
CREAM TOPICAL
Status: DISCONTINUED | OUTPATIENT
Start: 2019-08-03 | End: 2019-08-03

## 2019-08-03 RX ORDER — BISACODYL 10 MG
10 SUPPOSITORY, RECTAL RECTAL DAILY PRN
Status: DISCONTINUED | OUTPATIENT
Start: 2019-08-05 | End: 2019-08-04 | Stop reason: HOSPADM

## 2019-08-03 RX ORDER — METHYLERGONOVINE MALEATE 0.2 MG/ML
200 INJECTION INTRAVENOUS
Status: DISCONTINUED | OUTPATIENT
Start: 2019-08-03 | End: 2019-08-03

## 2019-08-03 RX ORDER — NALOXONE HYDROCHLORIDE 0.4 MG/ML
.1-.4 INJECTION, SOLUTION INTRAMUSCULAR; INTRAVENOUS; SUBCUTANEOUS
Status: DISCONTINUED | OUTPATIENT
Start: 2019-08-03 | End: 2019-08-03

## 2019-08-03 RX ORDER — OXYTOCIN 10 [USP'U]/ML
10 INJECTION, SOLUTION INTRAMUSCULAR; INTRAVENOUS
Status: DISCONTINUED | OUTPATIENT
Start: 2019-08-03 | End: 2019-08-03

## 2019-08-03 RX ORDER — IBUPROFEN 400 MG/1
800 TABLET, FILM COATED ORAL EVERY 6 HOURS PRN
Status: DISCONTINUED | OUTPATIENT
Start: 2019-08-03 | End: 2019-08-04 | Stop reason: HOSPADM

## 2019-08-03 RX ORDER — FERROUS SULFATE 325(65) MG
325 TABLET ORAL
Status: DISCONTINUED | OUTPATIENT
Start: 2019-08-03 | End: 2019-08-04 | Stop reason: HOSPADM

## 2019-08-03 RX ORDER — CHLORAL HYDRATE 500 MG
2 CAPSULE ORAL DAILY
COMMUNITY

## 2019-08-03 RX ORDER — BIOTIN 10000 MCG
10 CAPSULE ORAL DAILY
Status: DISCONTINUED | OUTPATIENT
Start: 2019-08-03 | End: 2019-08-03 | Stop reason: RX

## 2019-08-03 RX ORDER — CHLORAL HYDRATE 500 MG
2 CAPSULE ORAL DAILY
Status: DISCONTINUED | OUTPATIENT
Start: 2019-08-03 | End: 2019-08-04 | Stop reason: HOSPADM

## 2019-08-03 RX ORDER — FLUTICASONE PROPIONATE 50 MCG
1 SPRAY, SUSPENSION (ML) NASAL DAILY PRN
Status: DISCONTINUED | OUTPATIENT
Start: 2019-08-03 | End: 2019-08-04 | Stop reason: HOSPADM

## 2019-08-03 RX ORDER — CARBOPROST TROMETHAMINE 250 UG/ML
250 INJECTION, SOLUTION INTRAMUSCULAR
Status: DISCONTINUED | OUTPATIENT
Start: 2019-08-03 | End: 2019-08-03

## 2019-08-03 RX ORDER — NALBUPHINE HYDROCHLORIDE 10 MG/ML
2.5-5 INJECTION, SOLUTION INTRAMUSCULAR; INTRAVENOUS; SUBCUTANEOUS EVERY 6 HOURS PRN
Status: DISCONTINUED | OUTPATIENT
Start: 2019-08-03 | End: 2019-08-03

## 2019-08-03 RX ORDER — EPHEDRINE SULFATE 50 MG/ML
5 INJECTION, SOLUTION INTRAMUSCULAR; INTRAVENOUS; SUBCUTANEOUS
Status: DISCONTINUED | OUTPATIENT
Start: 2019-08-03 | End: 2019-08-03

## 2019-08-03 RX ORDER — HYDROCORTISONE 2.5 %
CREAM (GRAM) TOPICAL 3 TIMES DAILY PRN
Status: DISCONTINUED | OUTPATIENT
Start: 2019-08-03 | End: 2019-08-04 | Stop reason: HOSPADM

## 2019-08-03 RX ORDER — SODIUM CHLORIDE, SODIUM LACTATE, POTASSIUM CHLORIDE, CALCIUM CHLORIDE 600; 310; 30; 20 MG/100ML; MG/100ML; MG/100ML; MG/100ML
INJECTION, SOLUTION INTRAVENOUS CONTINUOUS
Status: DISCONTINUED | OUTPATIENT
Start: 2019-08-03 | End: 2019-08-03

## 2019-08-03 RX ORDER — ACETAMINOPHEN 325 MG/1
650 TABLET ORAL EVERY 4 HOURS PRN
Status: DISCONTINUED | OUTPATIENT
Start: 2019-08-03 | End: 2019-08-04 | Stop reason: HOSPADM

## 2019-08-03 RX ADMIN — Medication 1000 MCG: at 20:40

## 2019-08-03 RX ADMIN — ACETAMINOPHEN 650 MG: 325 TABLET, FILM COATED ORAL at 18:38

## 2019-08-03 RX ADMIN — SODIUM CHLORIDE, POTASSIUM CHLORIDE, SODIUM LACTATE AND CALCIUM CHLORIDE: 600; 310; 30; 20 INJECTION, SOLUTION INTRAVENOUS at 08:55

## 2019-08-03 RX ADMIN — OXYCODONE HYDROCHLORIDE AND ACETAMINOPHEN 1000 MG: 500 TABLET ORAL at 20:41

## 2019-08-03 RX ADMIN — SENNOSIDES AND DOCUSATE SODIUM 1 TABLET: 8.6; 5 TABLET ORAL at 20:41

## 2019-08-03 RX ADMIN — FENTANYL CITRATE 100 MCG: 50 INJECTION, SOLUTION INTRAMUSCULAR; INTRAVENOUS at 13:38

## 2019-08-03 RX ADMIN — LIDOCAINE HYDROCHLORIDE AND EPINEPHRINE 3 ML: 15; 5 INJECTION, SOLUTION EPIDURAL at 13:35

## 2019-08-03 RX ADMIN — OMEGA-3 FATTY ACIDS CAP 1000 MG 2 G: 1000 CAP at 20:41

## 2019-08-03 RX ADMIN — RANITIDINE 150 MG: 150 TABLET ORAL at 20:41

## 2019-08-03 RX ADMIN — Medication 12 ML/HR: at 13:45

## 2019-08-03 RX ADMIN — OXYTOCIN 2 MILLI-UNITS/MIN: 10 INJECTION INTRAVENOUS at 08:55

## 2019-08-03 RX ADMIN — ROPIVACAINE HYDROCHLORIDE 8 ML: 2 INJECTION, SOLUTION EPIDURAL; INFILTRATION at 13:38

## 2019-08-03 RX ADMIN — SODIUM CHLORIDE, POTASSIUM CHLORIDE, SODIUM LACTATE AND CALCIUM CHLORIDE: 600; 310; 30; 20 INJECTION, SOLUTION INTRAVENOUS at 13:03

## 2019-08-03 RX ADMIN — FERROUS SULFATE TAB 325 MG (65 MG ELEMENTAL FE) 325 MG: 325 (65 FE) TAB at 20:41

## 2019-08-03 NOTE — ANESTHESIA PROCEDURE NOTES
Peripheral nerve/Neuraxial procedure note : epidural catheter  Pre-Procedure  Performed by Phillip Aden MD  Location: OB      Pre-Anesthestic Checklist: patient identified, IV checked, risks and benefits discussed, informed consent, monitors and equipment checked, pre-op evaluation and at physician/surgeon's request    Timeout  Correct Patient: Yes   Correct Procedure: Yes   Correct Site: Yes   Correct Laterality: N/A   Correct Position: Yes   Site Marked: N/A   .   Procedure Documentation    Diagnosis:Labor Pain.    Procedure:    Epidural catheter.  Insertion Site:L3-4  (midline approach) Injection technique: LORT saline   Local skin infiltrated with mL of 1% lidocaine.  SHARON at 5 cm     Patient Prep;mask, sterile gloves, povidone-iodine 7.5% surgical scrub, patient draped.  .  Needle: Touhy needle Needle Gauge: 17.    Needle Length (Inches) 5  # of attempts: 1 and 2 and # of redirects:  .   Catheter: 19 G . .  Catheter threaded easily  4 cm epidural space.  9 cm at skin.   .    Assessment/Narrative  Paresthesias: No.  .  .  Aspiration negative for heme or CSF  . Test dose of 3 mL lidocaine 1.5% w/ 1:200,000 epinephrine at. Test dose negative for signs of intravascular, subdural or intrathecal injection. Comments:  Catheter secured with adhesive spray, tegaderm, and tape.

## 2019-08-03 NOTE — PLAN OF CARE
Vss, fundus firm with scant flow. Pain managed with tylenol. Pt breast feeding  well. Spouse at bedside. Encouarged to call with questions/needs.

## 2019-08-03 NOTE — PLAN OF CARE
Pt up to bathroom at 1630. Able to ambulate well with only standby assist. Able to void a large amount. Fiona care done and new gown on. Pt in wheelchair and up to postpartum room with infant in arms.

## 2019-08-03 NOTE — PLAN OF CARE
0755- Pt arrived for her induction of labor. Consent and history obtained.     0802- Monitors applied.     0820- IV inserted. SVE- 2/70/-3.    0838- Dr Benavides notified of patient's arrival. Will scan with US to verify presenting part since fetus is ballotable.    0845- Dr Rangel at bedside for US. Vertex presentation verified.    0855- Pitocin started at 2 mu.

## 2019-08-03 NOTE — H&P
[facility] Labor and Delivery History and Physical    Shobha Rader MRN# [id number]   Age: 34 year old YOB: 1985     Date of Admission:  8/3/2019    Primary care provider: Huy Smallwood           Chief Complaint:   Shobha Rader is a 34 year old female who is 39w0d pregnant and being admitted for induction of labor, indication maternal request and macrosomia risk and advanced dilation.          Pregnancy history:     OBSTETRIC HISTORY:    OB History    Para Term  AB Living   5 3 3 0 1 3   SAB TAB Ectopic Multiple Live Births   1 0 0 0 3      # Outcome Date GA Lbr Nino/2nd Weight Sex Delivery Anes PTL Lv   5 Current            4 Term 16 38w6d 06:13 / 00:03 4.601 kg (10 lb 2.3 oz) F Vag-Spont EPI, Local N JHONATAN      Name: JOVANA RADER      Apgar1: 7  Apgar5: 9   3 Term 14 39w0d 03:45 / 00:51 4.72 kg (10 lb 6.5 oz) M Vag-Spont EPI  JHONATAN      Name: JOVANA RADER      Apgar1: 8  Apgar5: 9   2 Term 18/ 40w0d 03:00 / 01:25 4.536 kg (10 lb) M Vag-Spont EPI, Local  JHONATAN      Name: KESHAWN RADER      Apgar1: 8  Apgar5: 9   1 SAB      SAB   DEC       EDC: Estimated Date of Delivery: Aug 10, 2019    Prenatal Labs:   Lab Results   Component Value Date    ABO A 2019    RH Pos 2019    AS neg 2019    HEPBANG neg 2019    TREPAB Negative 2016    RUBELLAABIGG immune 2015    HGB 13.0 2018    HIV neg 2013       GBS Status:   Lab Results   Component Value Date    GBS neg 2019       Active Problem List  Patient Active Problem List   Diagnosis     TMJ dysfunction     Intertrigo     Bariatric surgery status     Postsurgical malabsorption     Orthostatic hypertension     Telogen effluvium     BMI 25.0-25.9,adult     Iron deficiency anemia following bariatric surgery     Pregnancy related condition       Medication Prior to Admission  Medications Prior to Admission   Medication Sig Dispense Refill Last Dose      Acetaminophen (TYLENOL PO) Take 500 mg by mouth every 6 hours as needed for mild pain or fever   8/2/2019 at Unknown time     Ascorbic Acid (VITAMIN C PO)    8/2/2019 at Unknown time     BIOTIN PO Take 10,000 mcg by mouth daily   8/2/2019 at Unknown time     Calcium Citrate-Vitamin D (CALCIUM CITRATE + PO) Take 600 mg by mouth 2 times daily    Past Month at Unknown time     cyanocobalamin 1000 MCG SUBL Place 1,000 mcg under the tongue   8/2/2019 at Unknown time     ferrous sulfate (IRON) 325 (65 FE) MG tablet Take by mouth daily (with breakfast)   8/2/2019 at Unknown time     fish oil-omega-3 fatty acids 1000 MG capsule Take 2 g by mouth daily   8/2/2019 at Unknown time     Pediatric Multivit-Minerals-C (FLINTSTONES COMPLETE PO) Take 2 tablets by mouth At Bedtime    8/2/2019 at Unknown time     ranitidine (ZANTAC) 150 MG tablet TAKE 1 TABLET BY MOUTH TWICE DAILY 60 tablet 0 8/2/2019 at Unknown time     VITAMIN D, CHOLECALCIFEROL, PO Take 2,000 Units by mouth daily    Past Week at Unknown time     albuterol (PROAIR HFA/PROVENTIL HFA/VENTOLIN HFA) 108 (90 BASE) MCG/ACT Inhaler Inhale 2 puffs into the lungs every 6 hours as needed    More than a month at Unknown time     Docusate Sodium (COLACE PO) Take 300 mg by mouth daily   More than a month at Unknown time     fluticasone (FLONASE) 50 MCG/ACT spray Spray 1 spray into both nostrils daily as needed for rhinitis or allergies   More than a month at Unknown time     nitroFURantoin, macrocrystal-monohydrate, (MACROBID) 100 MG capsule Take 1 capsule (100 mg) by mouth 2 times daily (Patient not taking: Reported on 11/6/2017) 14 capsule 0 More than a month at Unknown time     ondansetron (ZOFRAN-ODT) 4 MG ODT tab Take 1 tablet (4 mg) by mouth every 6 hours as needed for nausea or vomiting 20 tablet 0 More than a month at Unknown time     pantoprazole (PROTONIX) 40 MG EC tablet Take 1 tablet (40 mg) by mouth daily 30 tablet 0 More than a month at Unknown time     sucralfate  (CARAFATE) 1 GM/10ML suspension Take 10 mLs (1 g) by mouth 4 times daily 420 mL 1 More than a month at Unknown time     sucralfate (CARAFATE) 1 GM/10ML suspension Take 10 mLs (1 g) by mouth 4 times daily 400 mL 1 More than a month at Unknown time   .        Maternal Past Medical History:     Past Medical History:   Diagnosis Date     Anxiety      Asthma     reactive airway disease, exercised induced     SAEID on CPAP      Post partum depression       labor     with current (second) pregnancy                       Family History:     Family History   Problem Relation Age of Onset     Sleep Apnea Mother      Diabetes Mother      Sleep Apnea Maternal Grandmother      Diabetes Maternal Grandmother      Sleep Apnea Maternal Grandfather      Hypertension Maternal Grandfather      Heart Failure Maternal Grandfather      Cerebrovascular Disease Maternal Grandfather      Family history reviewed and updated in [facility]            Social History:     Social History     Tobacco Use     Smoking status: Never Smoker     Smokeless tobacco: Never Used   Substance Use Topics     Alcohol use: No            Review of Systems:   C: NEGATIVE for fever, chills, change in weight  E/M: NEGATIVE for ear, mouth and throat problems  R: NEGATIVE for significant cough or SOB  CV: NEGATIVE for chest pain, palpitations or peripheral edema          Physical Exam:   Vitals were reviewed    Constitutional: Awake, alert, cooperative, no apparent distress, and appears stated age.  Eyes: Lids and lashes normal, pupils equal, round and reactive to light, extra ocular muscles intact, sclera clear, conjunctiva normal.  ENT: Normocephalic, without obvious abnormality, atramatic, sinuses nontender on palpation, external ears without lesions, oral pharynx with moist mucus membranes, tonsils without erythema or exudates, gums normal and good dentition.  Neck: Supple, symmetrical, trachea midline, no adenopathy, thyroid symmetric, not enlarged and no  tenderness, skin normal.  Hematologic / Lymphatic: No cervical lymphadenopathy and no supraclavicular lymphadenopathy.  Back: Symmetric, no curvature, spinous processes are non-tender on palpation, paraspinous muscles are non-tender on palpation, no costal vertebral tenderness.  Lungs: No increased work of breathing, good air exchange, clear to auscultation bilaterally, no crackles or wheezing.  Cardiovascular: Regular rate and rhythm, normal S1 and S2, no S3 or S4, and no murmur noted.  Chest / Breast: Breasts symmetrical, skin without lesion(s), no nipple retraction or dimpling, no nipple discharge, no masses palpated, no axillary or supraclavicular adenopathy.  Abdomen: No scars, normal bowel sounds, soft, non-distended, non-tender, no masses palpated, no hepatosplenomegally.  Genitourinary: No urethral discharge, normal external genitalia, no hernia.  Musculoskeletal: No redness, warmth, or swelling of the joints.  Full range of motion noted.  Motor strength is 5 out of 5 all extremities bilaterally.  Tone is normal.  Neurologic: Awake, alert, oriented to name, place and time.  Cranial nerves II-XII are grossly intact.  Motor is 5 out of 5 bilaterally.  Cerebellar finger to nose, heel to shin intact.  Sensory is intact.  Babinski down going, Romberg negative, and gait is normal.  Neuropsychiatric: Normal affect, mood, orientation, memory and insight.  Skin: No rashes, erythema, pallor, petechia or purpura.     Cervix:   Membranes: AROM   Dilation: 3   Effacement: 50%   Station:-3    Presentation:Cephalic  Fetal Heart Rate Tracing: reactive and reassuring  Tocometer: external monitor                       Assessment:   Shobha Rader is a 39w0d pregnant female admitted with induction of labor, indication maternal request and macrosomia risk. .          Plan:   Admit - see IP orders  Labor induction with Pitocin    Guerrero Benavides MD

## 2019-08-03 NOTE — PLAN OF CARE
1220- Pt uncomfortable with contractions. Desires epidural. LR bolus started.    1250- SVE- 3/80/-2. Pt very uncomfortable- contractions palpate strong.     1325- Dr Mcnally at bedside for epidural placement.    1345- Pt in left tilt position.    1350- Pt still uncomfortable. SVE 5/80/-2.    1403- 8/100/0. Dr Benavides called for delivery. Pt has an urge to push.    1410- Dr Benavides at bedside.    1412- Pt starting to push.    1414- Delivery of viable female. Apgars 8 & 9. See delivery summary for details.

## 2019-08-03 NOTE — ANESTHESIA PREPROCEDURE EVALUATION
Anesthesia Pre-Procedure Evaluation    Patient: Shobha Rader   MRN: [id number] : 1985          Preoperative Diagnosis: * No surgery found *        Past Medical History:   Diagnosis Date     Anxiety      Asthma     reactive airway disease, exercised induced     SAEID on CPAP      Post partum depression       labor     with current (second) pregnancy     Past Surgical History:   Procedure Laterality Date     LAPAROSCOPIC BYPASS GASTRIC N/A 2017    Procedure: LAPAROSCOPIC BYPASS GASTRIC;  LAPAROSCOPIC DAYSI-EN-Y GASTRIC BYPASS;  Surgeon: Austin Melara MD;  Location: [facility] OR     MASTECTOMY PARTIAL  2009    under R armpit     wisdom teeth         Anesthesia Evaluation     .             ROS/MED HX    ENT/Pulmonary:     (+)asthma , . .    Neurologic:       Cardiovascular:         METS/Exercise Tolerance:     Hematologic:         Musculoskeletal:         GI/Hepatic:         Renal/Genitourinary:         Endo:         Psychiatric:         Infectious Disease:         Malignancy:         Other:                           SAEID, HTN             Lab Results   Component Value Date    WBC 6.8 2018    HGB 13.0 2018    HCT 38.8 2018     2018    CRP 11.4 2017    SED 10 2017     2017    POTASSIUM 3.7 2017    CHLORIDE 103 2017    CO2 24 2017    BUN 10 2017    CR 0.75 2017    GLC 91 2017    ROSA 9.1 2017    ALBUMIN 3.5 2017    PROTTOTAL 8.5 2017    ALT 23 2017    AST 9 2017    ALKPHOS 92 2017    BILITOTAL 0.3 2017    TSH 1.09 2017    T4 1.10 2017    T3 192 (H) 2017    HCGS Negative 2017       Preop Vitals  BP Readings from Last 3 Encounters:   19 111/58   19 120/64   18 102/64    Pulse Readings from Last 3 Encounters:   18 78   18 64   18 53      Resp Readings from Last 3 Encounters:   19 16   19 16   18 12  "   SpO2 Readings from Last 3 Encounters:   09/17/18 99%   07/20/18 100%   08/02/17 98%      Temp Readings from Last 1 Encounters:   08/03/19 37.3  C (99.2  F) (Temporal)    Ht Readings from Last 1 Encounters:   08/03/19 1.727 m (5' 8\")      Wt Readings from Last 1 Encounters:   07/21/19 97.5 kg (215 lb)    Estimated body mass index is 32.69 kg/m  as calculated from the following:    Height as of this encounter: 1.727 m (5' 8\").    Weight as of 7/21/19: 97.5 kg (215 lb).       Anesthesia Plan      History & Physical Review      ASA Status:  3 .  OB Epidural Asa: 3            Postoperative Care      Consents  Anesthetic plan, risks, benefits and alternatives discussed with:  Patient..                 Phillip Aden MD  "

## 2019-08-03 NOTE — PLAN OF CARE
Pt. admitted from L&D  via wheelchair and transferred to bed with stcaio. Pt. arrived with baby and was accompanied by David ( spouse )  and arrived with personal belongings. Report was taken from Janny HUTSON Rn  in L&D. VSS. Fundus is firm and midline.  Vaginal bleeding is light.  Pitocin infusing at 100 ml's/ hr.  Pt. oriented to the room and call light system.

## 2019-08-04 VITALS
RESPIRATION RATE: 16 BRPM | SYSTOLIC BLOOD PRESSURE: 110 MMHG | DIASTOLIC BLOOD PRESSURE: 70 MMHG | TEMPERATURE: 97.9 F | BODY MASS INDEX: 32.69 KG/M2 | OXYGEN SATURATION: 99 % | HEIGHT: 68 IN | HEART RATE: 62 BPM

## 2019-08-04 PROCEDURE — 25000132 ZZH RX MED GY IP 250 OP 250 PS 637: Performed by: OBSTETRICS & GYNECOLOGY

## 2019-08-04 RX ADMIN — Medication 1 TABLET: at 09:01

## 2019-08-04 RX ADMIN — SENNOSIDES AND DOCUSATE SODIUM 1 TABLET: 8.6; 5 TABLET ORAL at 09:01

## 2019-08-04 RX ADMIN — ACETAMINOPHEN 650 MG: 325 TABLET, FILM COATED ORAL at 09:00

## 2019-08-04 RX ADMIN — ACETAMINOPHEN 650 MG: 325 TABLET, FILM COATED ORAL at 01:01

## 2019-08-04 RX ADMIN — ACETAMINOPHEN 650 MG: 325 TABLET, FILM COATED ORAL at 17:01

## 2019-08-04 RX ADMIN — ACETAMINOPHEN 650 MG: 325 TABLET, FILM COATED ORAL at 12:57

## 2019-08-04 NOTE — PLAN OF CARE
VSS, pain controlled with tylenol. Fundus firm with scant lochia. Breastfeeding well independently.  present and supportive at bedside. Will continue to monitor.

## 2019-08-04 NOTE — OP NOTE
Procedure Date: 2019      PREOPERATIVE DIAGNOSES:   Intrauterine pregnancy at 39 weeks.      POSTOPERATIVE DIAGNOSES:   Intrauterine pregnancy at 39 weeks.      PROCEDURE:  Normal spontaneous vaginal delivery.      SURGEON:  Guerrero Tobias MD      ASSISTANT:  None.      ANESTHESIA:  Epidural.      ESTIMATED BLOOD LOSS:  200 mL.      COMPLICATIONS:  None.      SPECIMENS:  Cord blood.      FINDINGS:  The patient had a viable female infant in OA position.  Weight and Apgars were unassigned at the time of this dictation, but appeared healthy and normal.  She had a long umbilical cord to the placenta, but the placenta delivered spontaneously.  We did do a 1-minute delayed cord clamping on the infant.  She incurred a skin split where her prior scar had been.  It was approximately a second-degree perineal laceration that was repaired with 3-0 Vicryl in the usual manner.  Needle, sponge and instrument counts were correct.  The patient tolerated the procedure well.      DISPOSITION:  The patient is in satisfactory stable condition and is in recovery.         GUERRERO TOBIAS MD             D: 2019   T: 2019   MT: JOSE      Name:     CANDI HERNANDEZ   MRN:      [id number]        Account:        [id number]   :      1985           Procedure Date: 2019      Document: [id number]

## 2019-08-04 NOTE — ANESTHESIA POSTPROCEDURE EVALUATION
Patient: Shobha Rader    * No procedures listed *    Diagnosis:* No pre-op diagnosis entered *  Diagnosis Additional Information: No value filed.    Anesthesia Type:  No value filed.    Note:  Anesthesia Post Evaluation    Patient location during evaluation: PACU  Patient participation: Able to fully participate in evaluation  Level of consciousness: awake and alert  Pain management: adequate  Airway patency: patent  Cardiovascular status: acceptable  Respiratory status: acceptable and unassisted  Hydration status: acceptable  PONV: none             Last vitals:  Vitals:    08/03/19 2000 08/04/19 0101 08/04/19 0852   BP: 103/63 90/51 106/64   Pulse: 70 62 59   Resp: 16 16 16   Temp: 36.8  C (98.3  F) 36.6  C (97.9  F) 37  C (98.6  F)   SpO2:            Electronically Signed By: Phillip Aden MD  August 4, 2019  3:19 PM

## 2019-08-04 NOTE — LACTATION NOTE
Initial Lactation visit. Hand out given. Recommend unlimited, frequent breast feedings: At least 8 - 12 times every 24 hours. Avoid pacifiers and supplementation with formula unless medically indicated. Explained benefits of holding baby skin on skin to help promote better breastfeeding outcomes. Will revisit as needed.    Shobha is discharging home today with her baby. She states breastfeeding has been going well so far. She had two older children that were tongue tied but states this baby is not. She denies questions or concerns regarding feedings. Recommended she continue marking infant's feedings, voids and stools on feeding log once at home and use outpatient lactation resources as needed. Shobha appreciative of my visit.     Kia Pandya RN IBCLC

## 2019-08-04 NOTE — PROGRESS NOTES
[facility]       DAILY NOTE - POSTPARTUM DAY 1     SUBJECTIVE:     Pain controlled? Yes  Tolerating a regular diet? YES  Ambulating? YES  Voiding without difficulty? Yes    OBJECTIVE:  Vitals:    19 1620 19 1718 19 0101   BP: 115/62 114/56 103/63 90/51   Pulse:  59 70 62   Resp:  16 16 16   Temp:  98.2  F (36.8  C) 98.3  F (36.8  C) 97.9  F (36.6  C)   TempSrc:  Oral Oral Oral   SpO2:       Height:           Constitutional: healthy, alert and no distress    Abdomen:  Uterine fundus is firm, non-tender and at the level of the umbilicus     Incision: Healing well      LABS:  Hemoglobin   Date Value Ref Range Status   2018 13.0 11.7 - 15.7 g/dL Final   2017 12.7 11.7 - 15.7 g/dL Final       ASSESSMENT:  Post-partum day #1 s/p   Pregnancy complicated by NO COMPLICATIONS    Doing well.       PLAN:   Discharge today.  Return to clinic in 6 weeks.   Continue routine postpartum cares    Guerrero Benavides

## 2019-08-06 NOTE — L&D DELIVERY NOTE
Delivery Date:  2019      PREOPERATIVE DIAGNOSIS:   Intrauterine pregnancy at 39 weeks.      POSTOPERATIVE DIAGNOSIS:   Intrauterine pregnancy at 39 weeks.      PROCEDURE:  Normal spontaneous vaginal delivery.      SURGEON:  Guerrero Tobias MD      ASSISTANT:  None.      ANESTHESIA:  Epidural.      ESTIMATED BLOOD LOSS:  200 mL.      COMPLICATIONS:  None.      SPECIMENS:  Cord blood.      FINDINGS:  The patient had a viable female infant in OA position.  Weight and Apgars were unassigned at the time of this dictation, but appeared healthy and normal.  She had a long umbilical cord to the placenta, but the placenta delivered spontaneously.  We did do a 1-minute delayed cord clamping on the infant.  She incurred a skin split where her prior scar had been.  It was approximately a second-degree perineal laceration that was repaired with 3-0 Vicryl in the usual manner.  Needle, sponge and instrument counts were correct.  The patient tolerated the procedure well.      DISPOSITION:  The patient is in satisfactory stable condition and is in Recovery.      Revised WT 2019 [facility] [place]            GUERRERO TOBIAS MD             D: 2019   T: 2019   MT: JOSE      Name:     CANDI HERNANDEZ   MRN:      [id number]        Account:        [id number]   :      1985        Delivery Date:  2019               Document: [id number]

## 2019-10-07 DIAGNOSIS — R10.13 EPIGASTRIC PAIN: ICD-10-CM

## 2019-10-07 DIAGNOSIS — Z98.84 BARIATRIC SURGERY STATUS: ICD-10-CM

## 2019-10-07 DIAGNOSIS — K27.9 PUD (PEPTIC ULCER DISEASE): ICD-10-CM

## 2019-10-08 RX ORDER — SUCRALFATE 1 G/10ML
SUSPENSION ORAL
Qty: 400 ML | Refills: 0 | OUTPATIENT
Start: 2019-10-08

## 2019-10-08 NOTE — TELEPHONE ENCOUNTER
"Pt had gastric bypass 7/19/17 with Dr. Melara.  She was informed about recommended pregnancy appointment but did not come in to be seen.  Also, she is past due for her regular annual appointment (supposed to be July).  Pt was given 1 month extension on her zantac because she was near 40 weeks pregnant.  Made new appointment for 2 weeks after due date in August 2019.  Cancelled this appointment, as well as her rescheduled appointment September 2019.  Now has appointment 10/28/19 but wants her medication now.  Did remind patient that she had already been given extension of medication because of her pregnancy.    Pt reports she has struggled with heartburn and ulcers repeatedly postop.  Reports she has been trailing off her Zantac for the past 3 weeks because she had no heartburn during her pregnancy.  She wanted to be off it if she didn't need it.    She has been experiencing \"fire in my throat\" occasionally since off Zantac; last night began to have sharp pain to epigastric area at 5 pm (2 hours after eating chips and bean dip).  States \"I was being naughty\".  States her symptoms are often r/t foods or after eating.  Worried ulcer had returned, so she put in request for zantac and carafate at pharmacy.    Today she is not having pain.  Not sure if she needs the carafate but wants the zantac for sure.  Asks for another extension until her appointment at the end of the month.    Would like rx to be sent to [facility] in [place].    Will consult PA, call pt back.  Sirisha Akhtar RN on 10/8/2019 at 9:35 AM        "

## 2019-10-08 NOTE — TELEPHONE ENCOUNTER
Per PA note, patient needs appointment.  Called pt to remind of need for appointment and check on symptoms.  No answer, left message asking for call back.  Sirisha Akhtar RN on 10/8/2019 at 8:24 AM

## 2019-10-08 NOTE — TELEPHONE ENCOUNTER
Consulted PA re: request. Plan as follows:     Med refilled for another month.  Per PA, NO more refills until pt is seen. Pt needs to be evaluated in clinic to determine if she needs carafate; low suspicion of ulcer r/t pain relief today.  Pt may try Maalox over the counter for symptoms until next appointment.  Pt needs to call clinic with worsening symptoms or any severe or unrelenting pain.    Called pt and reviewed plan of care as above. Pt agreeable to plan.  Sirisha Akhtar RN on 10/8/2019 at 9:50 AM

## 2019-10-28 ENCOUNTER — OFFICE VISIT (OUTPATIENT)
Dept: SURGERY | Facility: CLINIC | Age: 34
End: 2019-10-28
Payer: COMMERCIAL

## 2019-10-28 ENCOUNTER — HOSPITAL ENCOUNTER (OUTPATIENT)
Dept: LAB | Facility: CLINIC | Age: 34
Discharge: HOME | End: 2019-10-28
Attending: PHYSICIAN ASSISTANT | Admitting: PHYSICIAN ASSISTANT
Payer: COMMERCIAL

## 2019-10-28 VITALS — BODY MASS INDEX: 30.64 KG/M2 | WEIGHT: 201.5 LBS

## 2019-10-28 VITALS
BODY MASS INDEX: 30.54 KG/M2 | HEART RATE: 79 BPM | SYSTOLIC BLOOD PRESSURE: 123 MMHG | DIASTOLIC BLOOD PRESSURE: 66 MMHG | HEIGHT: 68 IN | OXYGEN SATURATION: 98 % | WEIGHT: 201.5 LBS

## 2019-10-28 DIAGNOSIS — E66.811 CLASS 1 OBESITY DUE TO EXCESS CALORIES WITHOUT SERIOUS COMORBIDITY WITH BODY MASS INDEX (BMI) OF 30.0 TO 30.9 IN ADULT: ICD-10-CM

## 2019-10-28 DIAGNOSIS — K91.2 POSTSURGICAL MALABSORPTION: ICD-10-CM

## 2019-10-28 DIAGNOSIS — Z98.84 BARIATRIC SURGERY STATUS: Primary | ICD-10-CM

## 2019-10-28 DIAGNOSIS — Z87.11 HISTORY OF GASTRIC ULCER: ICD-10-CM

## 2019-10-28 DIAGNOSIS — E16.1 REACTIVE HYPOGLYCEMIA: ICD-10-CM

## 2019-10-28 DIAGNOSIS — Z98.84 BARIATRIC SURGERY STATUS: ICD-10-CM

## 2019-10-28 DIAGNOSIS — E66.09 CLASS 1 OBESITY DUE TO EXCESS CALORIES WITHOUT SERIOUS COMORBIDITY WITH BODY MASS INDEX (BMI) OF 30.0 TO 30.9 IN ADULT: ICD-10-CM

## 2019-10-28 PROBLEM — L65.0 TELOGEN EFFLUVIUM: Status: RESOLVED | Noted: 2018-01-23 | Resolved: 2019-10-28

## 2019-10-28 PROBLEM — L30.4 INTERTRIGO: Status: RESOLVED | Noted: 2017-01-11 | Resolved: 2019-10-28

## 2019-10-28 LAB
ERYTHROCYTE [DISTWIDTH] IN BLOOD BY AUTOMATED COUNT: 12.9 % (ref 10–15)
FERRITIN SERPL-MCNC: 260 NG/ML (ref 12–150)
HCT VFR BLD AUTO: 41.4 % (ref 35–47)
HGB BLD-MCNC: 13.5 G/DL (ref 11.7–15.7)
IRON SATN MFR SERPL: 15 % (ref 15–46)
IRON SERPL-MCNC: 50 UG/DL (ref 35–180)
MCH RBC QN AUTO: 31 PG (ref 26.5–33)
MCHC RBC AUTO-ENTMCNC: 32.6 G/DL (ref 31.5–36.5)
MCV RBC AUTO: 95 FL (ref 78–100)
PLATELET # BLD AUTO: 234 10E9/L (ref 150–450)
PTH-INTACT SERPL-MCNC: 40 PG/ML (ref 12–64)
RBC # BLD AUTO: 4.35 10E12/L (ref 3.8–5.2)
TIBC SERPL-MCNC: 335 UG/DL (ref 240–430)
WBC # BLD AUTO: 7.8 10E9/L (ref 4–11)

## 2019-10-28 PROCEDURE — 82607 VITAMIN B-12: CPT | Performed by: PHYSICIAN ASSISTANT

## 2019-10-28 PROCEDURE — 83550 IRON BINDING TEST: CPT | Performed by: PHYSICIAN ASSISTANT

## 2019-10-28 PROCEDURE — 83970 ASSAY OF PARATHORMONE: CPT | Performed by: PHYSICIAN ASSISTANT

## 2019-10-28 PROCEDURE — 85027 COMPLETE CBC AUTOMATED: CPT | Performed by: PHYSICIAN ASSISTANT

## 2019-10-28 PROCEDURE — 97803 MED NUTRITION INDIV SUBSEQ: CPT | Performed by: DIETITIAN, REGISTERED

## 2019-10-28 PROCEDURE — 83540 ASSAY OF IRON: CPT | Performed by: PHYSICIAN ASSISTANT

## 2019-10-28 PROCEDURE — 82306 VITAMIN D 25 HYDROXY: CPT | Performed by: PHYSICIAN ASSISTANT

## 2019-10-28 PROCEDURE — 82728 ASSAY OF FERRITIN: CPT | Performed by: PHYSICIAN ASSISTANT

## 2019-10-28 PROCEDURE — 36415 COLL VENOUS BLD VENIPUNCTURE: CPT | Performed by: PHYSICIAN ASSISTANT

## 2019-10-28 PROCEDURE — 99213 OFFICE O/P EST LOW 20 MIN: CPT | Performed by: PHYSICIAN ASSISTANT

## 2019-10-28 RX ORDER — NORETHINDRONE
KIT
Refills: 4 | COMMUNITY
Start: 2019-09-17 | End: 2020-12-08

## 2019-10-28 ASSESSMENT — MIFFLIN-ST. JEOR: SCORE: 1672.5

## 2019-10-28 NOTE — PROGRESS NOTES
"NUTRITION POST OP APPOINTMENT  DATE OF VISIT: October 28, 2019    Shobha Rader  1985  female  [id number]  34 year old     ASSESSMENT:    REASON FOR VISIT:  Shobha is a 34 year old year old female presents today for 2 year PO nutrition follow-up appointment. Patient is accompanied by daughter (infant).    DIAGNOSIS:  Status post gastric bypass surgery.  Obesity Grade I BMI 30-34.9     ANTHROPOMETRICS:  Initial Weight: 308 lb  Height:  68.75\"  Current Weight:  201.5 lb   BMI: 30.64  kg/(m^2).    VITAMINS AND MINERALS:  2 Multivitamin with Minerals  600 mg Calcium With Vitamin D 2X per week  2000 International units Vitamin D-2X per week  1000 mcg Vitamin B-12 sublingual  1 Vitron C (not getting periods)    NUTRITION HISTORY:  Breakfast: Greek yogurt, 2 hard cooked eggs  Lunch: whole meat/cheese sandwich or leftovers from kids  Supper: pulled pork sandwich on bun, and or fruit/ vegetable  Snacks: cheese or cottage cheese or yogurt or fruit or low fat gold fish  Fluids consumed: Water, decaf coffee, 28 oz light Body [place] (20 calories), 10 oz skim milk,no ETOH  Consuming liquid calories: No  Protein intake: 70-80 grams/day  Tolerate regular texture food: Yes  Any foods not tolerated details: Yes  If any food not tolerated: high fat/ high sugar foods  Portion size: 1 cup or more  Take 20-30 minutes to consume each meal: No   Eat protein foods first: Yes  Fluids and meals  by 30 minutes: No  Chew foods thoroughly: No  Tolerating diet: Yes  Drinking high protein supplements: No  Consuming snacks per day: 4-5  Additional Information: Pt had a daughter ~ 2.5 months ago and is breast feeding. Plans to breast feed for 9.5 mor months. Weight is down ~ 14 pounds since having her daughter. Pt would like to continue with weight loss, but is worried about her milk supply dropping.    PHYSICAL ACTIVITY:  Type: none    DIAGNOSIS:  Previous Nutrition Diagnosis: Altered gastrointestinal function related to alteration in " gastrointestinal structure as evidenced by history of gastric bypass surgery.- no change    Previous goals:   Most not applicable due to pt breast feeding.      Current Nutrition Diagnosis: Altered gastrointestinal function related to alteration in gastrointestinal structure as evidenced by history of gastric bypass surgery.    INTERVENTION:   Nutrition Prescription: Eat 3 meals a day at regular intervals. Consume 60-90 grams of protein daily. Follow post-surgical vitamin and mineral protocol.  Assessed learning needs and learning preferences.    GOALS:  Start a vitamin b complex or a separate thiamine/ follow vitamin/ mineral schedule provided  Separate meals and fluids by 30 minutes  Take 20-30 minute per meal/ chew all food well  Restart exercise 2X per week for 30 minutes  Eat 5-6 meals per day (limit meals to 1 cup of food)        Implementation: Discussed progress toward previous goals; reinforced importance of following bariatric lifestyle changes.    NUTRITION MONITORING AND EVALUATION:  Anticipated compliance: fair- good  Verbalized fair-good understanding.    Follow up: Patient to follow up in 12 months.    TIME SPENT WITH PATIENT:  25 minutes    Eran Rodgers RD, LD  [facility]  [phone]

## 2019-10-28 NOTE — PROGRESS NOTES
BARIATRIC FOLLOW UP VISIT     October 28, 2019       HISTORY OF PRESENT ILLNESS: Pt returns today for her follow-up appointment status post laparoscopic gastric bypass. She is 2 1/2 months post partum.  Lost 14 lbs since delivery.  Is concerned because her weight loss has stalled. Is breastfeeding.     Initial Weight: 308 lb 12.8 oz (140.1 kg)   Current Weight: 201 lb 8 oz (91.4 kg)  Cumulative weight loss (lbs): 107.3  Last Visits Weight: 171 lb 14.4 oz (78 kg)     Patient is taking the following bariatric postoperative vitamins:  2 Complete multivitamins with minerals (takes at night)  2000 International Units of Vitamin D daily- twice weekly (Takes in am)  Calcium 600 mg BID twice weekly (takes in am)  1000 mcg of Vitamin B12 oral daily (takes at night)  1 Iron/Vit C. daily  (takes at night)    Pt is hoping to restart exercise routine once she can have her daughter at the  at the [facility].  Was told she was unable to have her with her in the gym.       SOCIAL HISTORY:  Pt denies smoking.  Pt denies alcohol use.  Avoids NSAIDS.  Avoids Caffeine    REVIEW OF SYSTEMS:     GI:  Nausea- none  Vomiting-none  Diarrhea-none  Constipation-none  Dysphagia-none  Abdominal Pain-once since delivery.  Has hx of anastomotic ulcer and was treated last year on Protonix and carafate.    Heartburn-Had some abdominal pain when she first got pregnant and went back on on ranitidine 150 mg daily.      SKIN:  Intertriginous irritation-itching under pannus no rash.       PSYCH:  Depression-none  Anxiety-none    :  No current periods.  On Minipill- for birth control.      LABS/IMAGING/MEDICAL RECORDS REVIEW:   LABS:  Vitamin D Deficiency screening   Date Value Ref Range Status   07/20/2018 62 20 - 75 ug/L Final     Comment:     Season, race, dietary intake, and treatment affect the concentration of   25-hydroxy-Vitamin D. Values may decrease during winter months and increase   during summer months. Values 20-29 ug/L may indicate  "Vitamin D insufficiency   and values <20 ug/L may indicate Vitamin D deficiency.  Vitamin D determination is routinely performed by an immunoassay specific for   25 hydroxyvitamin D3.  If an individual is on vitamin D2 (ergocalciferol)   supplementation, please specify 25 OH vitamin D2 and D3 level determination by   LCMSMS test [id number].       Parathyroid Hormone Intact   Date Value Ref Range Status   07/20/2018 43 18 - 80 pg/mL Final     Vitamin B12   Date Value Ref Range Status   07/20/2018 1,235 (H) 193 - 986 pg/mL Final     Hemoglobin   Date Value Ref Range Status   01/23/2018 13.0 11.7 - 15.7 g/dL Final     Ferritin   Date Value Ref Range Status   07/20/2018 208 (H) 12 - 150 ng/mL Final     Iron   Date Value Ref Range Status   07/20/2018 100 35 - 180 ug/dL Final     Iron Binding Cap   Date Value Ref Range Status   07/20/2018 362 240 - 430 ug/dL Final     Iron Saturation Index   Date Value Ref Range Status   07/20/2018 28 15 - 46 % Final   01/23/2018 20 15 - 46 % Final   11/06/2017 12 (L) 15 - 46 % Final         PHYSICAL EXAMINATION:   /66 (BP Location: Left arm, Patient Position: Sitting, Cuff Size: Adult Large)   Pulse 79   Ht 5' 8\" (1.727 m)   Wt 201 lb 8 oz (91.4 kg)   SpO2 98%   BMI 30.64 kg/m      GENERAL: Alert and oriented x3. NAD  HEART: No JVD  LUNGS: Breathing unlabored,  ABDOMEN: soft; nontender; nondistended, incision well healed. No hernia  EXTREMITIES: No LE edema bilaterally, Gait normal  SKIN: No intertriginous irritation or rash    ASSESSMENT AND PLAN:      2 years s/p laparoscopic gastric bypass surgery   Obesity Body mass index is 30.64 kg/m ..  Post surgical malabsorption:   Ordered CBC, vitamin B12, vitamin D, PTH, ferritin, TIBC, and iron labs.   Follow food plan per dietitian recommendations.   Continue taking recommended post-op vitamins. Move vitamin D to bedtime to be more consistent. Add calcium in bedroom to take while breastfeeding.   Reactive Hypoglycemia   Reviewed " reactive hypoglycemia and dietary treatment with protein and complex carbohydrates for meals and when lows occur.   Hx of Anastomotic Ulcer   - ranitidine (ZANTAC) 150 MG tablet; Take 1 tablet (150 mg) by mouth 2 times daily  Dispense: 180 tablet; Refill: 3  Return to clinic in 1 year.  Sooner if you are having trouble losing you baby weight.       I spent a total of 20 minutes face to face with Shobha during today's office visit. Over 50% of this time was spent counseling the patient and/or coordinating care.

## 2019-10-28 NOTE — PATIENT INSTRUCTIONS
Reactive Hypoglycemia     Reactive hypoglycemia can be common in sleeve gastrectomy or gastric bypass patients.  It is usually due to an excess insulin response caused by a rapid emptying of the stomach.  It happens 45 to 60 minutes after eating a meal, especially one that is high in carbohydrates.   Symptoms you may feel include:  sweaty   jittery   lightheaded   your heart is racing     To avoid reactive hypoglycemia, try the following steps:  Avoid simple carbohydrates such and limit sugar intake  Eat small meals and healthy snacks about every 3 hours  Exercise regularly  Eat protein first  Eat a variety of foods, including meat, poultry, fish, or nonmeat sources of protein, foods such as whole-grains, fruits, vegetables, and dairy products  Reactive hypoglycemia is treatable.  If symptoms continue, call [phone] and schedule an appointment with the dietitian and physician assistant.

## 2019-10-29 LAB
DEPRECATED CALCIDIOL+CALCIFEROL SERPL-MC: 41 UG/L (ref 20–75)
VIT B12 SERPL-MCNC: 860 PG/ML (ref 193–986)

## 2020-03-02 ENCOUNTER — HEALTH MAINTENANCE LETTER (OUTPATIENT)
Age: 35
End: 2020-03-02

## 2020-12-06 NOTE — PROGRESS NOTES
"Shobha Rader is a 35 year old female who is being evaluated via a billable telephone visit.      The patient has been notified of following:     \"This telephone visit will be conducted via a call between you and your physician/provider. We have found that certain health care needs can be provided without the need for a physical exam.  This service lets us provide the care you need with a short phone conversation.  If a prescription is necessary we can send it directly to your pharmacy.  If lab work is needed we can place an order for that and you can then stop by our lab to have the test done at a later time.    Telephone visits are billed at different rates depending on your insurance coverage. During this emergency period, for some insurers they may be billed the same as an in-person visit.  Please reach out to your insurance provider with any questions.    If during the course of the call the physician/provider feels a telephone visit is not appropriate, you will not be charged for this service.\"    Patient has given verbal consent for Telephone visit?  Yes-by MA    What phone number would you like to be contacted at? [phone]    How would you like to obtain your AVS? FolderBoyhart    Phone call duration: 34 minutes    Eran Rodgers RD, LD        NUTRITION POST OP APPOINTMENT  DATE OF VISIT: December 6, 2020    Shobha Rader  1985  female  [id number]  35 year old     ASSESSMENT:    REASON FOR VISIT:  Shobha is a 35 year old year old female presents today for 3 year PO nutrition follow-up appointment. Patient is accompanied by self.    DIAGNOSIS:  Status post gastric bypass surgery.  Obesity Overweight BMI 25-29.9     ANTHROPOMETRICS:  Initial Weight:308 lb.    Height:  68.75\"  Current Weight:  198.6 lb.per pt.   BMI: 29.76  kg/(m^2).    VITAMINS AND MINERALS:  No being consistent with vitamins for the past 2 weeks and longer for calcium*  2 Multivitamin with Minerals (Childrens chewable complete with 10 " mg iron) -HS  *Not consistent for 1 year 600 mg Calcium With Vitamin D 2X per week (morning/ lunch)  5000 International units Vitamin D  1000 mcg Vitamin B-12 sublingual  1 Ferous sulfate -2X per week (not getting periods)  Thiamine (250 mg)  1 vitamin B complex (100 mg thiamine)    MEDICATION FOR WEIGHT LOSS:  Will be starting Naltrexone (prescribed today)    NUTRITION HISTORY:  Breakfast: Cheerios with skim milk or low calorie Enrique Omkar breakfast meal  Lunch: 2 cheese stick, 1 handful grapes, crackers or 1 cup cottage cheese, orange or protein drink  Supper: 1.5-2 cups chili, sometimes corn bread  Snacks: chips or Cheese it's or Cheetos or Goldfish crackers-likes savory foods-after 3 pm  Fluids consumed: Water, 32 oz regular coffee,no ETOH, protein drink, milk  Consuming liquid calories: Yes  Protein intake: 60-70 grams/day  Tolerate regular texture food: Yes  Any foods not tolerated details: Yes  If any food not tolerated: fresh bread, pasta  Portion size: 1 cup or more  Take 20-30 minutes to consume each meal: No   Eat protein foods first: Yes  Fluids and meals separate by at least 30 minutes: No  Chew foods thoroughly: Yes  Tolerating diet: Yes  Drinking high protein supplements: Yes  Consuming snacks per day: grazing pattern after 3 pm  Additional Information: Poor video quality so converted to phone. Struggling more with emotional eating since the birth of her daughter in August of 2019. She stopped breast feeding after her daughter was ~ 10 months old. Highest weight during pregnancy 215 lb. Her personal goal is to get down to 165 lb. Snacking is biggest concern and bigger portions. Patients says she has had an ulcer, but is not willing to stop all caffeine.        PHYSICAL ACTIVITY:  Type: none since gym closed      DIAGNOSIS:  Previous Nutrition Diagnosis: Altered gastrointestinal function related to alteration in gastrointestinal structure as evidenced by history of gastric bypass surgery.- no  change    Previous goals:  Start a vitamin b complex or a separate thiamine/ follow vitamin/ mineral schedule provided-not met (did start b-complex and thiamine)  Separate meals and fluids by 30 minutes-not met  Take 20-30 minute per meal/ chew all food well-partially met  Restart exercise 2X per week for 30 minutes-not met  Eat 5-6 meals per day (limit meals to 1 cup of food)-not met       Current Nutrition Diagnosis: Altered gastrointestinal function related to alteration in gastrointestinal structure as evidenced by history of gastric bypass surgery.    INTERVENTION:   Nutrition Prescription: Eat 3 meals a day at regular intervals. Consume 60-90 grams of protein daily. Follow post-surgical vitamin and mineral protocol.  Assessed learning needs and learning preferences.    GOALS:  Take only 1 thiamine or b-complex (example 100 mg thiamine per week will meet requirements)  Switch to a multivitamin/ mineral that meets clinic guidelines or a vitamin/ mineral with adequate thiamine and vitamin D (see Mychart message)  Reduce caffeine/coffee by at least 50%  Focus on alternatives to emotional eating (adult coloring, games, listen to music)      Implementation: Discussed progress toward previous goals; reinforced importance of following bariatric lifestyle changes.    NUTRITION MONITORING AND EVALUATION:  Anticipated compliance: fair  Verbalized fair-good understanding.    Follow up: Patient to follow up in 2 months.      Eran Rodgers RD, LD  [facility], [place]  [phone]

## 2020-12-08 ENCOUNTER — VIRTUAL VISIT (OUTPATIENT)
Dept: SURGERY | Facility: CLINIC | Age: 35
End: 2020-12-08
Payer: COMMERCIAL

## 2020-12-08 VITALS — BODY MASS INDEX: 29.41 KG/M2 | HEIGHT: 69 IN | WEIGHT: 198.6 LBS

## 2020-12-08 DIAGNOSIS — B37.2 CANDIDAL INTERTRIGO: ICD-10-CM

## 2020-12-08 DIAGNOSIS — E66.3 OVERWEIGHT: ICD-10-CM

## 2020-12-08 DIAGNOSIS — E65 ABDOMINAL PANNUS: ICD-10-CM

## 2020-12-08 DIAGNOSIS — Z98.84 BARIATRIC SURGERY STATUS: Primary | ICD-10-CM

## 2020-12-08 DIAGNOSIS — K91.2 POSTSURGICAL MALABSORPTION: ICD-10-CM

## 2020-12-08 DIAGNOSIS — Z98.84 BARIATRIC SURGERY STATUS: ICD-10-CM

## 2020-12-08 PROBLEM — E66.09 CLASS 1 OBESITY DUE TO EXCESS CALORIES WITHOUT SERIOUS COMORBIDITY WITH BODY MASS INDEX (BMI) OF 30.0 TO 30.9 IN ADULT: Status: RESOLVED | Noted: 2017-01-11 | Resolved: 2020-12-08

## 2020-12-08 PROBLEM — E66.811 CLASS 1 OBESITY DUE TO EXCESS CALORIES WITHOUT SERIOUS COMORBIDITY WITH BODY MASS INDEX (BMI) OF 30.0 TO 30.9 IN ADULT: Status: RESOLVED | Noted: 2017-01-11 | Resolved: 2020-12-08

## 2020-12-08 PROCEDURE — 99214 OFFICE O/P EST MOD 30 MIN: CPT | Mod: 95 | Performed by: PHYSICIAN ASSISTANT

## 2020-12-08 PROCEDURE — 97803 MED NUTRITION INDIV SUBSEQ: CPT | Mod: 95 | Performed by: DIETITIAN, REGISTERED

## 2020-12-08 RX ORDER — NALTREXONE HYDROCHLORIDE 50 MG/1
TABLET, FILM COATED ORAL
Qty: 30 TABLET | Refills: 1 | Status: ON HOLD | OUTPATIENT
Start: 2020-12-08 | End: 2022-09-08

## 2020-12-08 RX ORDER — CYANOCOBALAMIN (VITAMIN B-12) 500 MCG
400 LOZENGE ORAL DAILY
COMMUNITY

## 2020-12-08 RX ORDER — NYSTATIN AND TRIAMCINOLONE ACETONIDE 100000; 1 [USP'U]/G; MG/G
CREAM TOPICAL
Qty: 30 G | Refills: 3 | Status: SHIPPED | OUTPATIENT
Start: 2020-12-08

## 2020-12-08 ASSESSMENT — MIFFLIN-ST. JEOR: SCORE: 1662.28

## 2020-12-08 NOTE — PROGRESS NOTES
"Shobha Rader is a 35 year old female who is being evaluated via a billable video visit.      The patient has been notified of following:     \"This video visit will be conducted via a call between you and your physician/provider. We have found that certain health care needs can be provided without the need for an in-person physical exam.  This service lets us provide the care you need with a video conversation.  If a prescription is necessary we can send it directly to your pharmacy.  If lab work is needed we can place an order for that and you can then stop by our lab to have the test done at a later time.    Video visits are billed at different rates depending on your insurance coverage.  Please reach out to your insurance provider with any questions.    If during the course of the call the physician/provider feels a video visit is not appropriate, you will not be charged for this service.\"    Patient has given verbal consent for Video visit? Yes  How would you like to obtain your AVS? MyChart  If you are dropped from the video visit, the video invite should be resent to: Text to cell phone: [phone]  Will anyone else be joining your video visit? No    Video-Visit Details    Type of service:  Video Visit    Video Start Time: 8:35 AM  Video End Time: 9:00 AM    Originating Location (pt. Location): Home    Distant Location (provider location):  [facility] [place]     Platform used for Video Visit: [employer]    Blank Walker PA-C    BARIATRIC FOLLOW UP VISIT     December 7, 2020    HISTORY OF PRESENT ILLNESS: Pt returns today for her follow-up appointment status post laparoscopic gastric bypass.     Had her daughter last year.  Cannot get baby weight off.  She is snacking all the time. Feels like she can't get the eating under control.  Tried metformin and phentermine.  In the past she has tried Phentermine for weight loss but this did not work.  Also tried metformin which " caused nausea.  Pt feels like she is addicted to food.  Pts sister pointed out her food addiction went to other areas such as shopping following surgery.  Pt stays away from alcohol for this reason.  Pt has family hx of alcoholism.  Pt feels a lot of the eating is emotional eating.      BARIATRIC METRICS:  Current Weight: 198 lb 9.6 oz (90.1 kg)(pt reported)  Body mass index is 29.76 kg/m .   Wt change since last visit (lbs): -2.9  Cumulative weight loss (lbs): 110.2    VITAMINS AND MINERALS:  Not being consistent with vitamins for the past 2 weeks and longer for calcium*  2 Multivitamin with Minerals (Childrens chewable complete with 10 mg iron) -HS  *Not consistent for 1 year 600 mg Calcium With Vitamin D 2X per week (morning/ lunch)  5000 International units Vitamin D  1000 mcg Vitamin B-12 sublingual  1 Ferous sulfate -2X per week (not getting periods)  Thiamine (250 mg)  1 vitamin B complex (100 mg thiamine)      BP Readings from Last 6 Encounters:   10/28/19 123/66   08/04/19 110/70   07/21/19 120/64   09/17/18 102/64   07/20/18 110/64   01/23/18 100/57     EXERCISE:   Was doing weight lifting and cardio 3-4 times a week until recent shutdown.  She loves going to the gym.  Is helpful for her mood.  Plans on returning once gym opens again.      SOCIAL HISTORY:  Pt denies smoking.  Pt denies alcohol use.  Avoids NSAIDS.  Avoids Caffeine- drinks 4 cups of coffee daily.      REVIEW OF SYSTEMS:     GI:  Nausea- none  Vomiting-none  Diarrhea-none  Constipation-none  Dysphagia-none  Abdominal Pain-Has hx of anastomotic ulcer and was treated last year on Protonix and carafate.  None currently.   Heartburn-none     SKIN:  Intertriginous irritation-itching under pannus no rash.         PSYCH:  Depression-none  Anxiety-comes and goes.      Hemoglobin A1C   Date Value Ref Range Status   11/06/2017 5.2 4.3 - 6.0 % Final     Vitamin D Deficiency screening   Date Value Ref Range Status   10/28/2019 41 20 - 75 ug/L Final      Comment:     Season, race, dietary intake, and treatment affect the concentration of   25-hydroxy-Vitamin D. Values may decrease during winter months and increase   during summer months. Values 20-29 ug/L may indicate Vitamin D insufficiency   and values <20 ug/L may indicate Vitamin D deficiency.  Vitamin D determination is routinely performed by an immunoassay specific for   25 hydroxyvitamin D3.  If an individual is on vitamin D2 (ergocalciferol)   supplementation, please specify 25 OH vitamin D2 and D3 level determination by   LCMSMS test [id number].       TSH   Date Value Ref Range Status   01/11/2017 1.09 0.40 - 4.00 mU/L Final     Sodium   Date Value Ref Range Status   07/30/2017 138 133 - 144 mmol/L Final     Potassium   Date Value Ref Range Status   07/30/2017 3.7 3.4 - 5.3 mmol/L Final     Chloride   Date Value Ref Range Status   07/30/2017 103 94 - 109 mmol/L Final     Carbon Dioxide   Date Value Ref Range Status   07/30/2017 24 20 - 32 mmol/L Final     Anion Gap   Date Value Ref Range Status   07/30/2017 11 3 - 14 mmol/L Final     Glucose   Date Value Ref Range Status   07/30/2017 91 70 - 99 mg/dL Final     Urea Nitrogen   Date Value Ref Range Status   07/30/2017 10 7 - 30 mg/dL Final     Creatinine   Date Value Ref Range Status   07/30/2017 0.75 0.52 - 1.04 mg/dL Final     GFR Estimate   Date Value Ref Range Status   07/30/2017 >90  Non  GFR Calc   >60 mL/min/1.7m2 Final     Calcium   Date Value Ref Range Status   07/30/2017 9.1 8.5 - 10.1 mg/dL Final     Bilirubin Total   Date Value Ref Range Status   07/30/2017 0.3 0.2 - 1.3 mg/dL Final     Alkaline Phosphatase   Date Value Ref Range Status   07/30/2017 92 40 - 150 U/L Final     ALT   Date Value Ref Range Status   07/30/2017 23 0 - 50 U/L Final     AST   Date Value Ref Range Status   07/30/2017 9 0 - 45 U/L Final     WBC   Date Value Ref Range Status   10/28/2019 7.8 4.0 - 11.0 10e9/L Final     Hemoglobin   Date Value Ref Range Status    10/28/2019 13.5 11.7 - 15.7 g/dL Final     Hematocrit   Date Value Ref Range Status   10/28/2019 41.4 35.0 - 47.0 % Final     MCV   Date Value Ref Range Status   10/28/2019 95 78 - 100 fl Final     Platelet Count   Date Value Ref Range Status   10/28/2019 234 150 - 450 10e9/L Final        :  No current periods.  On Minipill- for birth control.      Current Outpatient Medications   Medication     Acetaminophen (TYLENOL PO)     albuterol (PROAIR HFA/PROVENTIL HFA/VENTOLIN HFA) 108 (90 BASE) MCG/ACT Inhaler     Ascorbic Acid (VITAMIN C PO)     BIOTIN PO     Calcium Citrate-Vitamin D (CALCIUM CITRATE + PO)     cyanocobalamin 1000 MCG SUBL     ferrous sulfate (IRON) 325 (65 FE) MG tablet     fish oil-omega-3 fatty acids 1000 MG capsule     Pediatric Multivit-Minerals-C (FLINTSTONES COMPLETE PO)     Thiamine HCl 250 MG TABS     vitamin B complex with vitamin C (VITAMIN  B COMPLEX) tablet     VITAMIN D, CHOLECALCIFEROL, PO     vitamin E 400 units TABS     No current facility-administered medications for this visit.        LABS/IMAGING/MEDICAL RECORDS REVIEW:   LABS:  Hemoglobin A1C   Date Value Ref Range Status   11/06/2017 5.2 4.3 - 6.0 % Final     Vitamin D Deficiency screening   Date Value Ref Range Status   10/28/2019 41 20 - 75 ug/L Final     Comment:     Season, race, dietary intake, and treatment affect the concentration of   25-hydroxy-Vitamin D. Values may decrease during winter months and increase   during summer months. Values 20-29 ug/L may indicate Vitamin D insufficiency   and values <20 ug/L may indicate Vitamin D deficiency.  Vitamin D determination is routinely performed by an immunoassay specific for   25 hydroxyvitamin D3.  If an individual is on vitamin D2 (ergocalciferol)   supplementation, please specify 25 OH vitamin D2 and D3 level determination by   LCMSMS test [id number].       TSH   Date Value Ref Range Status   01/11/2017 1.09 0.40 - 4.00 mU/L Final     Sodium   Date Value Ref Range Status    07/30/2017 138 133 - 144 mmol/L Final     Potassium   Date Value Ref Range Status   07/30/2017 3.7 3.4 - 5.3 mmol/L Final     Chloride   Date Value Ref Range Status   07/30/2017 103 94 - 109 mmol/L Final     Carbon Dioxide   Date Value Ref Range Status   07/30/2017 24 20 - 32 mmol/L Final     Anion Gap   Date Value Ref Range Status   07/30/2017 11 3 - 14 mmol/L Final     Glucose   Date Value Ref Range Status   07/30/2017 91 70 - 99 mg/dL Final     Urea Nitrogen   Date Value Ref Range Status   07/30/2017 10 7 - 30 mg/dL Final     Creatinine   Date Value Ref Range Status   07/30/2017 0.75 0.52 - 1.04 mg/dL Final     GFR Estimate   Date Value Ref Range Status   07/30/2017 >90  Non  GFR Calc   >60 mL/min/1.7m2 Final     Calcium   Date Value Ref Range Status   07/30/2017 9.1 8.5 - 10.1 mg/dL Final     Bilirubin Total   Date Value Ref Range Status   07/30/2017 0.3 0.2 - 1.3 mg/dL Final     Alkaline Phosphatase   Date Value Ref Range Status   07/30/2017 92 40 - 150 U/L Final     ALT   Date Value Ref Range Status   07/30/2017 23 0 - 50 U/L Final     AST   Date Value Ref Range Status   07/30/2017 9 0 - 45 U/L Final     WBC   Date Value Ref Range Status   10/28/2019 7.8 4.0 - 11.0 10e9/L Final     Hemoglobin   Date Value Ref Range Status   10/28/2019 13.5 11.7 - 15.7 g/dL Final     Hematocrit   Date Value Ref Range Status   10/28/2019 41.4 35.0 - 47.0 % Final     MCV   Date Value Ref Range Status   10/28/2019 95 78 - 100 fl Final     Platelet Count   Date Value Ref Range Status   10/28/2019 234 150 - 450 10e9/L Final      Vitamin D Deficiency screening   Date Value Ref Range Status   10/28/2019 41 20 - 75 ug/L Final     Comment:     Season, race, dietary intake, and treatment affect the concentration of   25-hydroxy-Vitamin D. Values may decrease during winter months and increase   during summer months. Values 20-29 ug/L may indicate Vitamin D insufficiency   and values <20 ug/L may indicate Vitamin D  "deficiency.  Vitamin D determination is routinely performed by an immunoassay specific for   25 hydroxyvitamin D3.  If an individual is on vitamin D2 (ergocalciferol)   supplementation, please specify 25 OH vitamin D2 and D3 level determination by   LCMSMS test [email or web address].       Parathyroid Hormone Intact   Date Value Ref Range Status   10/28/2019 40 12 - 64 pg/mL Final     Vitamin B12   Date Value Ref Range Status   10/28/2019 860 193 - 986 pg/mL Final     Hemoglobin   Date Value Ref Range Status   10/28/2019 13.5 11.7 - 15.7 g/dL Final     Ferritin   Date Value Ref Range Status   10/28/2019 260 (H) 12 - 150 ng/mL Final     Iron   Date Value Ref Range Status   10/28/2019 50 35 - 180 ug/dL Final     Iron Binding Cap   Date Value Ref Range Status   10/28/2019 335 240 - 430 ug/dL Final     Iron Saturation Index   Date Value Ref Range Status   10/28/2019 15 15 - 46 % Final   07/20/2018 28 15 - 46 % Final   01/23/2018 20 15 - 46 % Final         PHYSICAL EXAMINATION:   Ht 5' 8.5\" (1.74 m)   Wt 198 lb 9.6 oz (90.1 kg)   BMI 29.76 kg/m      GENERAL: Alert and oriented x3. NAD  HEART: No JVD  LUNGS: Breathing unlabored,  ABDOMEN: soft; nontender; nondistended, incision well healed. No hernia  EXTREMITIES: No LE edema bilaterally, Gait normal  SKIN: No intertriginous irritation or rash    ASSESSMENT AND PLAN:      Bariatric surgery status  7/19/2017 RYGBS BRP  We reviewed the important post op bariatric recommendations:  -eating 3 meals daily  -eating protein first, getting >60gm protein daily  -eating slowly, chewing food well  -avoiding/limiting calorie containing beverages  -avoiding fluids 30 minutes before, during, and after meals  -limiting restaurant or cafeteria eating to twice a week or less    Shobha was reminded that, to avoid marginal ulcers she should avoid tobacco at all, alcohol in excess, caffeine, and NSAIDS (unless indicated for cardioprotection or otherwise and opposed by a PPI).    She was encouraged to " establish and maintain a consistent physical activity routine, 6-8 hours of restorative sleep each night and optimal stress management.    Shobha was reminded about the importance of life long vitamin supplementation and life long follow up.      Overweight  We discussed healthy habits to assist with weight loss. We discussed medication that may assist with weight loss. Naltrexone was prescribed. Risks/ benefits and possible side effects were discussed and questions were answered. Written information was given in AVS.   - naltrexone (DEPADE/REVIA) 50 MG tablet; Take 1/2 tablet once daily 1-2 hours prior to worst cravings for 1 week, then increase to 1 tablet daily as directed if tolerating  Dispense: 30 tablet; Refill: 1    Candidal intertrigo/ Abdominal pannus  Ordered  - nystatin-triamcinolone (MYCOLOG II) 653674-6.1 UNIT/GM-% external cream; Apply to affected area BID up to 7 days prn rash  Dispense: 30 g; Refill: 3  - PLASTIC SURGERY REFERRAL     Postsurgical malabsorption  RD discussed vitamin recommendations today with pt.   Labs ordered per protocol.  - CBC with platelets; Future  - Vitamin B12; Future  - Vitamin D Screen; Future  - Parathyroid Hormone Intact; Future  - Iron and Iron Binding Capacity; Future  - Ferritin; Future    FOLLOW-UP:  Return to clinic in 1 month for med check.

## 2020-12-08 NOTE — PATIENT INSTRUCTIONS
"  MEDICATION STARTED AT THIS APPOINTMENT  We are starting Naltrexone. Start with 1/2 tab 1-2 hours prior to the time you have the most trouble with cravings or extra hunger. If you are doing well you may switch to a whole tablet taken at the same time period.     WARNING: This medication blocks the action of opioid type pain medications. If you routinely take any medication like Codeine, Oxycontin,Percocet,Morphine,Dilaudid or Methodone, do not take this until you have talked with weight management staff. If you are planning surgery you should stop Naltrexone 4 days prior to the surgery. If you have an injury that requires pain medication, make sure the health care staff knows you take Naltrexone.     [employer] or call [phone] if you have any questions or concerns. (Do not stop taking it if you don't think it's working. For some people it works without them knowing it.)    Naltrexone is a medication that is used most often to help people who are troubled by dependence on prescription pain killers or alcohol. It has also been found to help with weight loss. Although it's not currently FDA approved for weight loss, it has been used safely for a number of years to help people who are carrying extra weight.     Just how Naltrexone helps with weight loss has not been exactly determined.  It seems to work by quieting down brain signals related to strong food cravings. Many of our patients use the word \"addiction\" to describe their feelings and constant thoughts about food. It makes sense then to treat the feeling of dependence on food, outside of real hunger, with a medication designed to help with other sorts of dependence.     Our patients on Naltrexone find that they:    >feel less interest in food   >think less about food and eating and have more time to think of other things   >find it easier to push the plate away   >have an easier time eating less    For some of our patients, these feelings are very immediate. " Other patients, don't feel much of a change but find they've lost weight. Like all weight loss medications, Naltrexone works best when you help it work. This means:  1. Having less tempting high calorie (fattening) food around the house or office. (For people with strong cravings this is very important.)   2. Staying away from situations or people that may trigger your cravings .   3. Eating out only one time or less each week.  4. Eating your meals at a table with the TV or computer off.    Side-effects. Naltrexone is generally well tolerated. The main side-effect we see is  nausea or a woozy feeling. A small number of people feel quite ill. Most people have a mild reaction and some people have no reaction at all.  The good news is that this feeling does go away.     In order to avoid nausea, please start the medication with half a pill for the first few days. Go on to a full pill if you are feeling well.      If you  are nauseated on 1/2 a pill it is okay to cut back to 1/4 pill ( a very small amount). Take this for a couple of days and work your way back up to a 1/2 pill and then a whole pill. Taking the medication at night or with food  to start also may help prevent the feeling of nausea.         Please refer to the pharmacy insert for more information on side-effects. Since many pharmacists are not familiar with the use of naltrexone in weight loss, calling the nurse at [phone] will get you the most accurate information.  In order to get refills of this or any medication we prescribe you must be seen in the medical weight mgmt clinic every 2-3 months.     Bariatric Post Op Guidelines  General:    To avoid marginal ulcers avoid all forms of tobacco, alcohol in excess, caffeine, and NSAIDS (unless indicated for cardioprotection or othewise and opposed by a PPI).    Exercise is key for continued weight loss and weight maintenance. Aim for 30-60 minutes of physical activity most days of the week. Include  cardiovascular and strength training.    Continue lifelong vitamins supplementation and annual lab follow up.  All  patients should supplement with the following bariatric postoperative vitamins:  2 Complete multivitamins with minerals (at different times than calcium)  Vitamin D 5000 Int Units/125 mg daily   Calcium 600 mg twice daily or 500 mg hree times daily   Vitamin B12: 500 mcg of sl daily or 1000 mcg Inj monthly  B complex daily or Thiamine 100 mg weekly  1 Iron/Vit C. Daily for females who menstruate and/or as directed    The bariatric team should be aware and potentially evaluate all adverse gastrointestinal symptoms which can be a sign of complication. Inability to tolerate textured solid food (chicken, steak, fish) may need to be evaluated by endoscopy.    There is a 10% increase of Alcohol Use Disorder in patients with bariatric surgery.   Most often occurring around 2 years post op.  Please call the clinic if you feel alcohol is interfering in your daily life.  We can help.     Follow up annually lifelong. Obesity is a chronic disease.  Weight gain can be expected. The goal of follow-up visits is to ensure adequate vitamin and protein absorption, evaluate food intake behavior, review exercise/activity level, and assist with weight regain.    Nutritional:  Eat 3 meals per day  (No snacks between meals.)  Do not skip meals.  This can cause overeating at the next meal and will prevent adequate protein and nutritional intake.    Aim for 60-80 grams of protein per day.  Always eat your protein first. This assists with optimal nutrition and helps you stay full longer.    Eat your protein first, and then follow with fiber.    Add fiber by including fruits, vegetables, whole grains, and beans.     Portions should be about 1 cup per meal. Use measuring cups to be accurate.  Continue to use saucer/salad plates, infant/toddler silverware to keep portion sizes small and take small bites.    Eat S-L-O-W-L-Y to make  each meal last 20-30 minutes. Always stop eating when satisfied.    Aim for 64 oz. of calorie-free fluids daily.    Avoid drinking 30 min before, during, and 30 min after meal    Avoid high sugar and high fat foods to prevent high calorie intake. This will reduce your rate of weight loss and can cause weight regain.   Check nutrition labels for less than 10 grams of sugar and less than 10 grams of fat per serving.

## 2020-12-11 ENCOUNTER — MYC MEDICAL ADVICE (OUTPATIENT)
Dept: SURGERY | Facility: CLINIC | Age: 35
End: 2020-12-11

## 2020-12-11 ENCOUNTER — TELEPHONE (OUTPATIENT)
Dept: SURGERY | Facility: CLINIC | Age: 35
End: 2020-12-11

## 2020-12-11 ENCOUNTER — TRANSFERRED RECORDS (OUTPATIENT)
Dept: HEALTH INFORMATION MANAGEMENT | Facility: CLINIC | Age: 35
End: 2020-12-11

## 2020-12-11 NOTE — TELEPHONE ENCOUNTER
"Pt called in.  Asked RN to fax labs to:  [facility]  Sarah Benavides    Fax:  [phone] (sent in [employer] message)    Lab orders have been faxed to [facility] at #  [phone] .  Fax receipt confirmation was checked on [employer] but says \"no answer at fax number\" x 2 attempts.    Called pt back and updated her.  Pt states she will call office again to confirm and get back to clinic.  Sirisha Akhtar RN on 12/11/2020 at 11:00 AM    "

## 2020-12-11 NOTE — TELEPHONE ENCOUNTER
Previous fax did not send.  Pt sent corrected number by [employer]. Fax has been faxed to [facility] at # [phone] as requested.  Fax receipt confirmation was obtained via [employer].  Sirisha Akhtar RN on 12/11/2020 at 1:38 PM

## 2020-12-14 ENCOUNTER — HEALTH MAINTENANCE LETTER (OUTPATIENT)
Age: 35
End: 2020-12-14

## 2021-04-18 ENCOUNTER — HEALTH MAINTENANCE LETTER (OUTPATIENT)
Age: 36
End: 2021-04-18

## 2021-04-20 ENCOUNTER — MYC MEDICAL ADVICE (OUTPATIENT)
Dept: SURGERY | Facility: CLINIC | Age: 36
End: 2021-04-20

## 2021-04-20 ENCOUNTER — TELEPHONE (OUTPATIENT)
Dept: SURGERY | Facility: CLINIC | Age: 36
End: 2021-04-20

## 2021-04-20 NOTE — TELEPHONE ENCOUNTER
Forwarded patient message re: recent lab questions.  RAMU Parson looked at patient's labs that are currently in Epic.  LUDWIG Parson PA-C. Labs reviewed. All labs WNL. Please let patient know to continue with current supplements. She will call patient if she has additional questions.    Called patient and informed her re: above.  States she had labs that aren't in the system.  She had them done at [facility] in [place] that were sent to [facility].  Is worried that her liver enzyme(s) were elevated.    Patient will send My Chart message with picture of labs she has questions on .  Nimco Anand, MS, RD, RN

## 2021-04-20 NOTE — TELEPHONE ENCOUNTER
Called patient per RAMU Parson order.  Let patient know her ALT at 35 is fine.  Called patient and informed her re: above.  Patient verbalized understanding and is agreeable to plan.  Nimco Anand MS, RD, RN

## 2021-04-20 NOTE — TELEPHONE ENCOUNTER
Reason for call:  Other   Patient called regarding (reason for call): call back  Additional comments: pt has questions about lab results and would like a call back to discuss    Phone number to reach patient:  Home number on file [phone] (home)    Best Time:      Can we leave a detailed message on this number?  YES    Travel screening: Not Applicable

## 2021-06-02 ENCOUNTER — RECORDS - HEALTHEAST (OUTPATIENT)
Dept: ADMINISTRATIVE | Facility: CLINIC | Age: 36
End: 2021-06-02

## 2021-10-02 ENCOUNTER — HEALTH MAINTENANCE LETTER (OUTPATIENT)
Age: 36
End: 2021-10-02

## 2022-03-19 ENCOUNTER — HEALTH MAINTENANCE LETTER (OUTPATIENT)
Age: 37
End: 2022-03-19

## 2022-05-26 ENCOUNTER — APPOINTMENT (OUTPATIENT)
Dept: ULTRASOUND IMAGING | Facility: CLINIC | Age: 37
End: 2022-05-26
Attending: OBSTETRICS & GYNECOLOGY
Payer: COMMERCIAL

## 2022-05-26 ENCOUNTER — HOSPITAL ENCOUNTER (OUTPATIENT)
Facility: CLINIC | Age: 37
Discharge: HOME | End: 2022-05-26
Attending: OBSTETRICS & GYNECOLOGY | Admitting: OBSTETRICS & GYNECOLOGY
Payer: COMMERCIAL

## 2022-05-26 ENCOUNTER — HOSPITAL ENCOUNTER (OUTPATIENT)
Facility: CLINIC | Age: 37
End: 2022-05-26
Admitting: OBSTETRICS & GYNECOLOGY
Payer: COMMERCIAL

## 2022-05-26 VITALS — DIASTOLIC BLOOD PRESSURE: 59 MMHG | RESPIRATION RATE: 16 BRPM | TEMPERATURE: 98.24 F | SYSTOLIC BLOOD PRESSURE: 113 MMHG

## 2022-05-26 PROBLEM — Z36.89 ENCOUNTER FOR TRIAGE IN PREGNANT PATIENT: Status: ACTIVE | Noted: 2022-05-26

## 2022-05-26 LAB
ALBUMIN UR-MCNC: 10 MG/DL
APPEARANCE UR: CLEAR
BACTERIA #/AREA URNS HPF: ABNORMAL /HPF
BILIRUB UR QL STRIP: NEGATIVE
COLOR UR AUTO: YELLOW
GLUCOSE UR STRIP-MCNC: NEGATIVE MG/DL
HGB UR QL STRIP: NEGATIVE
KETONES UR STRIP-MCNC: ABNORMAL MG/DL
LEUKOCYTE ESTERASE UR QL STRIP: NEGATIVE
MUCOUS THREADS #/AREA URNS LPF: PRESENT /LPF
NITRATE UR QL: NEGATIVE
PH UR STRIP: 5.5 [PH] (ref 5–7)
RBC URINE: 1 /HPF
SP GR UR STRIP: 1.03 (ref 1–1.03)
SQUAMOUS EPITHELIAL: 1 /HPF
UROBILINOGEN UR STRIP-MCNC: NORMAL MG/DL
WBC URINE: <1 /HPF

## 2022-05-26 PROCEDURE — G0463 HOSPITAL OUTPT CLINIC VISIT: HCPCS

## 2022-05-26 PROCEDURE — 81001 URINALYSIS AUTO W/SCOPE: CPT | Performed by: OBSTETRICS & GYNECOLOGY

## 2022-05-26 PROCEDURE — 76805 OB US >/= 14 WKS SNGL FETUS: CPT

## 2022-05-26 RX ORDER — VENLAFAXINE 25 MG/1
25 TABLET ORAL 3 TIMES DAILY
Status: ON HOLD | COMMUNITY
End: 2022-09-08

## 2022-05-26 RX ORDER — LIDOCAINE 40 MG/G
CREAM TOPICAL
Status: DISCONTINUED | OUTPATIENT
Start: 2022-05-26 | End: 2022-05-26 | Stop reason: HOSPADM

## 2022-05-26 NOTE — PLAN OF CARE
Patient called into [facility] per services of Dr. Alamo with [facility]. Patient reports that she has been having vaginal spotting pretty much her whole pregnancy, but that past couple days it has gotten worse. She states that it is not on her pad, but is always there when she wipes. Did have a very small clot/mucous come out today. Denies any leaking. Reports + fetal movement. Patient denies any reports of a placenta previa at her 20 week U/S. Denies any recent intercourse or vigorous activity. She has had loose stools for the past couple days. Does not believe she has had any jaswant or cramping, but states it is difficult to tell with her irritable bowels.     Orders received from Dr. Alamo for a transvaginal U/S to check cervical length, EFW and placental placement. UA sent.     HX and VS taken. History obtained. Verbal permission to place TOCO.

## 2022-05-26 NOTE — PROVIDER NOTIFICATION
05/26/22 1804   Provider Notification   Provider Name/Title Dr. Alamo   Method of Notification Phone   Request Evaluate - Remote   Notification Reason Status Update   Telephone call to Dr. Alamo- Updated on UA and ultrasound results. No contractions seen on TOCO. Orders to discharge patient to home. She is to follow up in the clinic within the week.

## 2022-05-26 NOTE — DISCHARGE INSTRUCTIONS
Discharge Instruction for Undelivered Patients      You were seen for: Bleeding Assessment  We Consulted: Dr. Alamo  You had (Test or Medicine):Ultrasound, UA, contraction monitoring    Diet:   Drink 8 to 12 glasses of liquids (milk, juice, water) every day.     Activity:  Call your doctor or nurse midwife if your baby is moving less than usual.     Call your provider if you notice:  Swelling in your face or increased swelling in your hands or legs.  Headaches that are not relieved by Tylenol (acetaminophen).  Changes in your vision (blurring: seeing spots or stars.)  Nausea (sick to your stomach) and vomiting (throwing up).   Weight gain of 5 pounds or more per week.  Heartburn that doesn't go away.  Signs of bladder infection: pain when you urinate (use the toilet), need to go more often and more urgently.  The bag of gupta (rupture of membranes) breaks, or you notice leaking in your underwear.  Bright red blood in your underwear.  Abdominal (lower belly) or stomach pain.  For first baby: Contractions (tightening) less than 5 minutes apart for one hour or more.  Second (plus) baby: Contractions (tightening) less than 10 minutes apart and getting stronger.  *If less than 34 weeks: Contractions (tightening) more than 6 times in one hour.  Increase or change in vaginal discharge (note the color and amount)      Follow-up:  As scheduled in the clinic- within the next week.

## 2022-05-27 ENCOUNTER — TRANSFERRED RECORDS (OUTPATIENT)
Dept: HEALTH INFORMATION MANAGEMENT | Facility: CLINIC | Age: 37
End: 2022-05-27
Payer: COMMERCIAL

## 2022-09-03 ENCOUNTER — HEALTH MAINTENANCE LETTER (OUTPATIENT)
Age: 37
End: 2022-09-03

## 2022-09-08 ENCOUNTER — HOSPITAL ENCOUNTER (INPATIENT)
Facility: CLINIC | Age: 37
LOS: 2 days | Discharge: HOME | End: 2022-09-10
Attending: OBSTETRICS & GYNECOLOGY | Admitting: OBSTETRICS & GYNECOLOGY
Payer: COMMERCIAL

## 2022-09-08 LAB
ABO/RH(D): NORMAL
ANTIBODY SCREEN: NEGATIVE
ERYTHROCYTE [DISTWIDTH] IN BLOOD BY AUTOMATED COUNT: 12.6 % (ref 10–15)
HCT VFR BLD AUTO: 36 % (ref 35–47)
HGB BLD-MCNC: 11.6 G/DL (ref 11.7–15.7)
HOLD SPECIMEN: NORMAL
MCH RBC QN AUTO: 29.7 PG (ref 26.5–33)
MCHC RBC AUTO-ENTMCNC: 32.2 G/DL (ref 31.5–36.5)
MCV RBC AUTO: 92 FL (ref 78–100)
PLATELET # BLD AUTO: 193 10E3/UL (ref 150–450)
RBC # BLD AUTO: 3.91 10E6/UL (ref 3.8–5.2)
SARS-COV-2 RNA RESP QL NAA+PROBE: NEGATIVE
SPECIMEN EXPIRATION DATE: NORMAL
WBC # BLD AUTO: 7 10E3/UL (ref 4–11)

## 2022-09-08 PROCEDURE — 120N000012 HC R&B POSTPARTUM

## 2022-09-08 PROCEDURE — 10907ZC DRAINAGE OF AMNIOTIC FLUID, THERAPEUTIC FROM PRODUCTS OF CONCEPTION, VIA NATURAL OR ARTIFICIAL OPENING: ICD-10-PCS | Performed by: OBSTETRICS & GYNECOLOGY

## 2022-09-08 PROCEDURE — 59025 FETAL NON-STRESS TEST: CPT

## 2022-09-08 PROCEDURE — 86850 RBC ANTIBODY SCREEN: CPT | Performed by: OBSTETRICS & GYNECOLOGY

## 2022-09-08 PROCEDURE — 250N000009 HC RX 250: Performed by: OBSTETRICS & GYNECOLOGY

## 2022-09-08 PROCEDURE — 36415 COLL VENOUS BLD VENIPUNCTURE: CPT | Performed by: OBSTETRICS & GYNECOLOGY

## 2022-09-08 PROCEDURE — 85027 COMPLETE CBC AUTOMATED: CPT | Performed by: OBSTETRICS & GYNECOLOGY

## 2022-09-08 PROCEDURE — 0KQM0ZZ REPAIR PERINEUM MUSCLE, OPEN APPROACH: ICD-10-PCS | Performed by: OBSTETRICS & GYNECOLOGY

## 2022-09-08 PROCEDURE — 86780 TREPONEMA PALLIDUM: CPT | Performed by: OBSTETRICS & GYNECOLOGY

## 2022-09-08 PROCEDURE — 86901 BLOOD TYPING SEROLOGIC RH(D): CPT | Performed by: OBSTETRICS & GYNECOLOGY

## 2022-09-08 PROCEDURE — 258N000003 HC RX IP 258 OP 636: Performed by: OBSTETRICS & GYNECOLOGY

## 2022-09-08 PROCEDURE — U0003 INFECTIOUS AGENT DETECTION BY NUCLEIC ACID (DNA OR RNA); SEVERE ACUTE RESPIRATORY SYNDROME CORONAVIRUS 2 (SARS-COV-2) (CORONAVIRUS DISEASE [COVID-19]), AMPLIFIED PROBE TECHNIQUE, MAKING USE OF HIGH THROUGHPUT TECHNOLOGIES AS DESCRIBED BY CMS-2020-01-R: HCPCS | Performed by: OBSTETRICS & GYNECOLOGY

## 2022-09-08 PROCEDURE — 250N000013 HC RX MED GY IP 250 OP 250 PS 637: Performed by: OBSTETRICS & GYNECOLOGY

## 2022-09-08 PROCEDURE — G0463 HOSPITAL OUTPT CLINIC VISIT: HCPCS | Mod: 25

## 2022-09-08 PROCEDURE — 722N000001 HC LABOR CARE VAGINAL DELIVERY SINGLE

## 2022-09-08 RX ORDER — FERROUS SULFATE 325(65) MG
325 TABLET ORAL
Status: DISCONTINUED | OUTPATIENT
Start: 2022-09-09 | End: 2022-09-10 | Stop reason: HOSPADM

## 2022-09-08 RX ORDER — LIDOCAINE 40 MG/G
CREAM TOPICAL
Status: DISCONTINUED | OUTPATIENT
Start: 2022-09-08 | End: 2022-09-08

## 2022-09-08 RX ORDER — ONDANSETRON 4 MG/1
4 TABLET, ORALLY DISINTEGRATING ORAL EVERY 6 HOURS PRN
Status: DISCONTINUED | OUTPATIENT
Start: 2022-09-08 | End: 2022-09-08 | Stop reason: HOSPADM

## 2022-09-08 RX ORDER — METOCLOPRAMIDE 10 MG/1
10 TABLET ORAL EVERY 6 HOURS PRN
Status: DISCONTINUED | OUTPATIENT
Start: 2022-09-08 | End: 2022-09-08

## 2022-09-08 RX ORDER — VENLAFAXINE HYDROCHLORIDE 37.5 MG/1
37.5 CAPSULE, EXTENDED RELEASE ORAL AT BEDTIME
Status: DISCONTINUED | OUTPATIENT
Start: 2022-09-08 | End: 2022-09-10 | Stop reason: HOSPADM

## 2022-09-08 RX ORDER — PROCHLORPERAZINE 25 MG
25 SUPPOSITORY, RECTAL RECTAL EVERY 12 HOURS PRN
Status: DISCONTINUED | OUTPATIENT
Start: 2022-09-08 | End: 2022-09-08

## 2022-09-08 RX ORDER — MISOPROSTOL 200 UG/1
800 TABLET ORAL
Status: DISCONTINUED | OUTPATIENT
Start: 2022-09-08 | End: 2022-09-10 | Stop reason: HOSPADM

## 2022-09-08 RX ORDER — SODIUM CHLORIDE, SODIUM LACTATE, POTASSIUM CHLORIDE, CALCIUM CHLORIDE 600; 310; 30; 20 MG/100ML; MG/100ML; MG/100ML; MG/100ML
INJECTION, SOLUTION INTRAVENOUS CONTINUOUS
Status: DISCONTINUED | OUTPATIENT
Start: 2022-09-08 | End: 2022-09-08

## 2022-09-08 RX ORDER — OXYTOCIN/0.9 % SODIUM CHLORIDE 30/500 ML
1-24 PLASTIC BAG, INJECTION (ML) INTRAVENOUS CONTINUOUS
Status: DISCONTINUED | OUTPATIENT
Start: 2022-09-08 | End: 2022-09-08

## 2022-09-08 RX ORDER — PROCHLORPERAZINE 25 MG
25 SUPPOSITORY, RECTAL RECTAL EVERY 12 HOURS PRN
Status: DISCONTINUED | OUTPATIENT
Start: 2022-09-08 | End: 2022-09-08 | Stop reason: HOSPADM

## 2022-09-08 RX ORDER — OXYTOCIN/0.9 % SODIUM CHLORIDE 30/500 ML
340 PLASTIC BAG, INJECTION (ML) INTRAVENOUS CONTINUOUS PRN
Status: DISCONTINUED | OUTPATIENT
Start: 2022-09-08 | End: 2022-09-10 | Stop reason: HOSPADM

## 2022-09-08 RX ORDER — OXYTOCIN/0.9 % SODIUM CHLORIDE 30/500 ML
100-340 PLASTIC BAG, INJECTION (ML) INTRAVENOUS CONTINUOUS PRN
Status: DISCONTINUED | OUTPATIENT
Start: 2022-09-08 | End: 2022-09-08

## 2022-09-08 RX ORDER — SODIUM CHLORIDE, SODIUM LACTATE, POTASSIUM CHLORIDE, CALCIUM CHLORIDE 600; 310; 30; 20 MG/100ML; MG/100ML; MG/100ML; MG/100ML
INJECTION, SOLUTION INTRAVENOUS CONTINUOUS PRN
Status: DISCONTINUED | OUTPATIENT
Start: 2022-09-08 | End: 2022-09-08

## 2022-09-08 RX ORDER — ONDANSETRON 2 MG/ML
4 INJECTION INTRAMUSCULAR; INTRAVENOUS EVERY 6 HOURS PRN
Status: DISCONTINUED | OUTPATIENT
Start: 2022-09-08 | End: 2022-09-08 | Stop reason: HOSPADM

## 2022-09-08 RX ORDER — BISACODYL 10 MG
10 SUPPOSITORY, RECTAL RECTAL DAILY PRN
Status: DISCONTINUED | OUTPATIENT
Start: 2022-09-08 | End: 2022-09-10 | Stop reason: HOSPADM

## 2022-09-08 RX ORDER — VENLAFAXINE HYDROCHLORIDE 37.5 MG/1
37.5 CAPSULE, EXTENDED RELEASE ORAL AT BEDTIME
COMMUNITY

## 2022-09-08 RX ORDER — METOCLOPRAMIDE 10 MG/1
10 TABLET ORAL EVERY 6 HOURS PRN
Status: DISCONTINUED | OUTPATIENT
Start: 2022-09-08 | End: 2022-09-08 | Stop reason: HOSPADM

## 2022-09-08 RX ORDER — MODIFIED LANOLIN
OINTMENT (GRAM) TOPICAL
Status: DISCONTINUED | OUTPATIENT
Start: 2022-09-08 | End: 2022-09-10 | Stop reason: HOSPADM

## 2022-09-08 RX ORDER — ACETAMINOPHEN 325 MG/1
650 TABLET ORAL EVERY 4 HOURS PRN
Status: DISCONTINUED | OUTPATIENT
Start: 2022-09-08 | End: 2022-09-08

## 2022-09-08 RX ORDER — ONDANSETRON 4 MG/1
4 TABLET, ORALLY DISINTEGRATING ORAL EVERY 6 HOURS PRN
Status: DISCONTINUED | OUTPATIENT
Start: 2022-09-08 | End: 2022-09-08

## 2022-09-08 RX ORDER — MISOPROSTOL 200 UG/1
800 TABLET ORAL
Status: DISCONTINUED | OUTPATIENT
Start: 2022-09-08 | End: 2022-09-08

## 2022-09-08 RX ORDER — ACETAMINOPHEN 325 MG/1
650 TABLET ORAL EVERY 4 HOURS PRN
Status: DISCONTINUED | OUTPATIENT
Start: 2022-09-08 | End: 2022-09-10 | Stop reason: HOSPADM

## 2022-09-08 RX ORDER — IBUPROFEN 400 MG/1
800 TABLET, FILM COATED ORAL
Status: DISCONTINUED | OUTPATIENT
Start: 2022-09-08 | End: 2022-09-08

## 2022-09-08 RX ORDER — TERBUTALINE SULFATE 1 MG/ML
0.25 INJECTION, SOLUTION SUBCUTANEOUS
Status: DISCONTINUED | OUTPATIENT
Start: 2022-09-08 | End: 2022-09-08

## 2022-09-08 RX ORDER — HYDROCORTISONE 25 MG/G
CREAM TOPICAL 3 TIMES DAILY PRN
Status: DISCONTINUED | OUTPATIENT
Start: 2022-09-08 | End: 2022-09-10 | Stop reason: HOSPADM

## 2022-09-08 RX ORDER — METOCLOPRAMIDE HYDROCHLORIDE 5 MG/ML
10 INJECTION INTRAMUSCULAR; INTRAVENOUS EVERY 6 HOURS PRN
Status: DISCONTINUED | OUTPATIENT
Start: 2022-09-08 | End: 2022-09-08

## 2022-09-08 RX ORDER — PROCHLORPERAZINE MALEATE 5 MG
10 TABLET ORAL EVERY 6 HOURS PRN
Status: DISCONTINUED | OUTPATIENT
Start: 2022-09-08 | End: 2022-09-08 | Stop reason: HOSPADM

## 2022-09-08 RX ORDER — KETOROLAC TROMETHAMINE 30 MG/ML
30 INJECTION, SOLUTION INTRAMUSCULAR; INTRAVENOUS
Status: DISCONTINUED | OUTPATIENT
Start: 2022-09-08 | End: 2022-09-08

## 2022-09-08 RX ORDER — TRANEXAMIC ACID 10 MG/ML
1 INJECTION, SOLUTION INTRAVENOUS EVERY 30 MIN PRN
Status: DISCONTINUED | OUTPATIENT
Start: 2022-09-08 | End: 2022-09-10 | Stop reason: HOSPADM

## 2022-09-08 RX ORDER — ONDANSETRON 2 MG/ML
4 INJECTION INTRAMUSCULAR; INTRAVENOUS EVERY 6 HOURS PRN
Status: DISCONTINUED | OUTPATIENT
Start: 2022-09-08 | End: 2022-09-08

## 2022-09-08 RX ORDER — NALOXONE HYDROCHLORIDE 0.4 MG/ML
0.4 INJECTION, SOLUTION INTRAMUSCULAR; INTRAVENOUS; SUBCUTANEOUS
Status: DISCONTINUED | OUTPATIENT
Start: 2022-09-08 | End: 2022-09-08

## 2022-09-08 RX ORDER — OXYTOCIN 10 [USP'U]/ML
10 INJECTION, SOLUTION INTRAMUSCULAR; INTRAVENOUS
Status: DISCONTINUED | OUTPATIENT
Start: 2022-09-08 | End: 2022-09-08

## 2022-09-08 RX ORDER — CARBOPROST TROMETHAMINE 250 UG/ML
250 INJECTION, SOLUTION INTRAMUSCULAR
Status: DISCONTINUED | OUTPATIENT
Start: 2022-09-08 | End: 2022-09-08

## 2022-09-08 RX ORDER — OXYTOCIN 10 [USP'U]/ML
10 INJECTION, SOLUTION INTRAMUSCULAR; INTRAVENOUS
Status: DISCONTINUED | OUTPATIENT
Start: 2022-09-08 | End: 2022-09-10 | Stop reason: HOSPADM

## 2022-09-08 RX ORDER — MISOPROSTOL 200 UG/1
400 TABLET ORAL
Status: DISCONTINUED | OUTPATIENT
Start: 2022-09-08 | End: 2022-09-08

## 2022-09-08 RX ORDER — CITRIC ACID/SODIUM CITRATE 334-500MG
30 SOLUTION, ORAL ORAL
Status: DISCONTINUED | OUTPATIENT
Start: 2022-09-08 | End: 2022-09-08

## 2022-09-08 RX ORDER — TRANEXAMIC ACID 10 MG/ML
1 INJECTION, SOLUTION INTRAVENOUS EVERY 30 MIN PRN
Status: DISCONTINUED | OUTPATIENT
Start: 2022-09-08 | End: 2022-09-08

## 2022-09-08 RX ORDER — IBUPROFEN 400 MG/1
800 TABLET, FILM COATED ORAL EVERY 6 HOURS PRN
Status: DISCONTINUED | OUTPATIENT
Start: 2022-09-08 | End: 2022-09-10 | Stop reason: HOSPADM

## 2022-09-08 RX ORDER — METOCLOPRAMIDE HYDROCHLORIDE 5 MG/ML
10 INJECTION INTRAMUSCULAR; INTRAVENOUS EVERY 6 HOURS PRN
Status: DISCONTINUED | OUTPATIENT
Start: 2022-09-08 | End: 2022-09-08 | Stop reason: HOSPADM

## 2022-09-08 RX ORDER — CHOLECALCIFEROL (VITAMIN D3) 50 MCG
2000 TABLET ORAL DAILY
Status: DISCONTINUED | OUTPATIENT
Start: 2022-09-09 | End: 2022-09-10 | Stop reason: HOSPADM

## 2022-09-08 RX ORDER — FENTANYL CITRATE 50 UG/ML
50 INJECTION, SOLUTION INTRAMUSCULAR; INTRAVENOUS EVERY 30 MIN PRN
Status: DISCONTINUED | OUTPATIENT
Start: 2022-09-08 | End: 2022-09-08

## 2022-09-08 RX ORDER — NALOXONE HYDROCHLORIDE 0.4 MG/ML
0.2 INJECTION, SOLUTION INTRAMUSCULAR; INTRAVENOUS; SUBCUTANEOUS
Status: DISCONTINUED | OUTPATIENT
Start: 2022-09-08 | End: 2022-09-08

## 2022-09-08 RX ORDER — METHYLERGONOVINE MALEATE 0.2 MG/ML
200 INJECTION INTRAVENOUS
Status: DISCONTINUED | OUTPATIENT
Start: 2022-09-08 | End: 2022-09-08

## 2022-09-08 RX ORDER — DOCUSATE SODIUM 100 MG/1
100 CAPSULE, LIQUID FILLED ORAL DAILY
Status: DISCONTINUED | OUTPATIENT
Start: 2022-09-09 | End: 2022-09-10 | Stop reason: HOSPADM

## 2022-09-08 RX ORDER — METHYLERGONOVINE MALEATE 0.2 MG/ML
200 INJECTION INTRAVENOUS
Status: DISCONTINUED | OUTPATIENT
Start: 2022-09-08 | End: 2022-09-10 | Stop reason: HOSPADM

## 2022-09-08 RX ORDER — OXYTOCIN/0.9 % SODIUM CHLORIDE 30/500 ML
340 PLASTIC BAG, INJECTION (ML) INTRAVENOUS CONTINUOUS PRN
Status: DISCONTINUED | OUTPATIENT
Start: 2022-09-08 | End: 2022-09-08

## 2022-09-08 RX ORDER — MISOPROSTOL 200 UG/1
400 TABLET ORAL
Status: DISCONTINUED | OUTPATIENT
Start: 2022-09-08 | End: 2022-09-10 | Stop reason: HOSPADM

## 2022-09-08 RX ORDER — CARBOPROST TROMETHAMINE 250 UG/ML
250 INJECTION, SOLUTION INTRAMUSCULAR
Status: DISCONTINUED | OUTPATIENT
Start: 2022-09-08 | End: 2022-09-10 | Stop reason: HOSPADM

## 2022-09-08 RX ORDER — PROCHLORPERAZINE MALEATE 5 MG
10 TABLET ORAL EVERY 6 HOURS PRN
Status: DISCONTINUED | OUTPATIENT
Start: 2022-09-08 | End: 2022-09-08

## 2022-09-08 RX ADMIN — LIDOCAINE HYDROCHLORIDE 20 ML: 10 INJECTION, SOLUTION EPIDURAL; INFILTRATION; INTRACAUDAL; PERINEURAL at 20:45

## 2022-09-08 RX ADMIN — ACETAMINOPHEN 650 MG: 325 TABLET ORAL at 23:46

## 2022-09-08 RX ADMIN — VENLAFAXINE HYDROCHLORIDE 37.5 MG: 37.5 CAPSULE, EXTENDED RELEASE ORAL at 23:46

## 2022-09-08 RX ADMIN — SODIUM CHLORIDE, POTASSIUM CHLORIDE, SODIUM LACTATE AND CALCIUM CHLORIDE: 600; 310; 30; 20 INJECTION, SOLUTION INTRAVENOUS at 17:57

## 2022-09-08 RX ADMIN — Medication 2 MILLI-UNITS/MIN: at 19:40

## 2022-09-08 ASSESSMENT — ACTIVITIES OF DAILY LIVING (ADL)
ADLS_ACUITY_SCORE: 22
ADLS_ACUITY_SCORE: 22
ADLS_ACUITY_SCORE: 37
ADLS_ACUITY_SCORE: 22

## 2022-09-08 NOTE — PLAN OF CARE
Shobha admitted into [facility] for rule out labor. Pt stats labor started around 1530 today and ctx every 6-10 per pt. Pt placed on monitor with verbal consent. FHTs with baseline , moderate, +accels, no decels. Cx every 4-6 on monitor. SVE perform and pt found to be 6/60/-2 with a bulging bag of water. AVSS. Pt denies leaking of fluid or vaginal bleeding, however she states she has felt more discharge this week.  Active fetal movement. Dr Benavides updated on pt and orders received for intrapartum admission. Report to Barbie DESIR

## 2022-09-08 NOTE — PROVIDER NOTIFICATION
"Dr Benavides paged with update, \"SVE 6/60/-2. bulging bag of water. Ctx every 4-6. Please advise, thanks!\"    Return call from Dr Benavides. Will admit pt for labor. TORB for intrapartum orders.   "

## 2022-09-09 LAB — T PALLIDUM AB SER QL: NONREACTIVE

## 2022-09-09 PROCEDURE — 120N000012 HC R&B POSTPARTUM

## 2022-09-09 PROCEDURE — 250N000013 HC RX MED GY IP 250 OP 250 PS 637: Performed by: OBSTETRICS & GYNECOLOGY

## 2022-09-09 RX ADMIN — ACETAMINOPHEN 650 MG: 325 TABLET ORAL at 19:17

## 2022-09-09 RX ADMIN — Medication 2000 UNITS: at 09:23

## 2022-09-09 RX ADMIN — VENLAFAXINE HYDROCHLORIDE 37.5 MG: 37.5 CAPSULE, EXTENDED RELEASE ORAL at 21:25

## 2022-09-09 RX ADMIN — ACETAMINOPHEN 650 MG: 325 TABLET ORAL at 12:36

## 2022-09-09 RX ADMIN — ACETAMINOPHEN 650 MG: 325 TABLET ORAL at 07:07

## 2022-09-09 RX ADMIN — Medication 1 TABLET: at 09:23

## 2022-09-09 RX ADMIN — IBUPROFEN 800 MG: 400 TABLET, FILM COATED ORAL at 19:17

## 2022-09-09 RX ADMIN — Medication 1000 MCG: at 09:23

## 2022-09-09 RX ADMIN — B-COMPLEX W/ C & FOLIC ACID TAB 1 TABLET: TAB at 09:23

## 2022-09-09 RX ADMIN — Medication 1 TABLET: at 21:25

## 2022-09-09 RX ADMIN — DOCUSATE SODIUM 100 MG: 100 CAPSULE, LIQUID FILLED ORAL at 09:20

## 2022-09-09 ASSESSMENT — ACTIVITIES OF DAILY LIVING (ADL)
ADLS_ACUITY_SCORE: 22

## 2022-09-09 NOTE — CARE PLAN
Unchanged SVE per Dr. Benavides. MD discussed oxytocin augmentation, patient and spouse consent to plan of care.

## 2022-09-09 NOTE — PROGRESS NOTES
[facility]       DAILY NOTE - POSTPARTUM DAY 1     SUBJECTIVE:     Pain controlled? Yes  Tolerating a regular diet? YES  Ambulating? YES  Voiding without difficulty? Yes    OBJECTIVE:  Vitals:    22 2219 22 2226 22 2352 22 0335   BP: 106/55 99/51 106/58 105/64   BP Location:    Right arm   Resp:   17 16   Temp:   97.7  F (36.5  C) 97.8  F (36.6  C)   TempSrc:   Oral Oral   Weight:       Height:           Constitutional: healthy, alert and no distress    Abdomen:  Uterine fundus is firm, non-tender and at the level of the umbilicus     Incision: Healing well      LABS:  Hemoglobin   Date Value Ref Range Status   2022 11.6 (L) 11.7 - 15.7 g/dL Final   10/28/2019 13.5 11.7 - 15.7 g/dL Final   2018 13.0 11.7 - 15.7 g/dL Final       ASSESSMENT:  Post-partum day #1 s/p   Pregnancy complicated by maternal obesity, AMA, malabsorption x/p bariatric procedure.   Desires sterilization with this admission    Doing well.  No excessive bleeding  Pain well-controlled.       PLAN:   Continue routine postpartum cares  I have contacted my office to schedule postpartum tubal tonight or tomorrow.     Guerrero Benavides MD

## 2022-09-09 NOTE — PLAN OF CARE
Pt. admitted from L&D  via wheelchair and transferred to bed with minimal assist. Pt. arrived with baby and was accompanied by  and arrived with personal belongings. Writer recovered patient in LD and had received SBAR from delivery nurse. VSS. Fundus is firm and midline.  Vaginal bleeding is light to scant.  Pitocin infusing in LPIV.  Pt. oriented to the room and call light system.

## 2022-09-09 NOTE — PLAN OF CARE
Vital signs stable. Postpartum assessment WDL. Pain controlled with Tylenol and heat packs.  Patient can not take ibuprofen due to a history of gastric bypass. . Patient voiding without difficulty. Breastfeeding on cue independently.  Patient and infant bonding well. Will continue with current plan of care.

## 2022-09-09 NOTE — PLAN OF CARE
Vaginal delivery of viable female infant at 2036. MD, spouse, and sister at bedside. Nuchal x2. APGARS 8 and 9. 2nd degree tear. Plan for routine postpartum cares.

## 2022-09-09 NOTE — H&P
[facility] Labor and Delivery History and Physical    Shobha Rader MRN# [id number]   Age: 37 year old YOB: 1985     Date of Admission:  2022    Primary care provider: Huy Smallwood           Chief Complaint:   Shobha Rader is a 37 year old female who is 38w0d pregnant and being admitted for labor management and AROM.          Pregnancy history:     OBSTETRIC HISTORY:    OB History    Para Term  AB Living   7 4 4 0 2 4   SAB IAB Ectopic Multiple Live Births   2 0 0 0 4      # Outcome Date GA Lbr Nino/2nd Weight Sex Delivery Anes PTL Lv   7 Current            6 Term 19 39w0d 01:57 / 00:02 3.374 kg (7 lb 7 oz) F Vag-Spont EPI  JHONATAN      Name: LILIANE RADER-SHOBHA      Apgar1: 9  Apgar5: 9   5 Term 16 38w6d 06:13 / 00:03 4.601 kg (10 lb 2.3 oz) F Vag-Spont EPI, Local N JHONATAN      Name: JOVANA RADER      Apgar1: 7  Apgar5: 9   4 Term 14 39w0d 03:45 / 00:51 4.72 kg (10 lb 6.5 oz) M Vag-Spont EPI  JHONATAN      Name: JOVANA RADER      Apgar1: 8  Apgar5: 9   3 Term /12 40w0d 03:00 / 01:25 4.536 kg (10 lb) M Vag-Spont EPI, Local  JHONATAN      Name: KESHAWN RADER      Apgar1: 8  Apgar5: 9   2 SAB            1 SAB      SAB   DEC       EDC: Estimated Date of Delivery: Sep 22, 2022    Prenatal Labs:   Lab Results   Component Value Date    ABO A 2019    RH Pos 2019    AS Negative 2022    HEPBANG neg 2019    TREPAB Negative 2016    RUBELLAABIGG immune 2015    HGB 11.6 (L) 2022    HIV neg 2013       GBS Status:   Lab Results   Component Value Date    GBS neg 2019       Active Problem List  Patient Active Problem List   Diagnosis     TMJ dysfunction     Bariatric surgery status     Postsurgical malabsorption     Iron deficiency anemia following bariatric surgery     Pregnancy related condition      (normal spontaneous vaginal delivery)     Reactive hypoglycemia     Overweight     Encounter for  triage in pregnant patient     Indication for care in labor or delivery       Medication Prior to Admission  Medications Prior to Admission   Medication Sig Dispense Refill Last Dose     venlafaxine (EFFEXOR XR) 37.5 MG 24 hr capsule Take 37.5 mg by mouth At Bedtime        Acetaminophen (TYLENOL PO) Take 500 mg by mouth every 6 hours as needed for mild pain or fever        albuterol (PROAIR HFA/PROVENTIL HFA/VENTOLIN HFA) 108 (90 BASE) MCG/ACT Inhaler Inhale 2 puffs into the lungs every 6 hours as needed         Ascorbic Acid (VITAMIN C PO)         BIOTIN PO Take 10,000 mcg by mouth daily        Calcium Citrate-Vitamin D (CALCIUM CITRATE + PO) Take 600 mg by mouth 2 times daily         cyanocobalamin 1000 MCG SUBL Place 1,000 mcg under the tongue        ferrous sulfate (IRON) 325 (65 FE) MG tablet Take by mouth daily (with breakfast)        fish oil-omega-3 fatty acids 1000 MG capsule Take 2 g by mouth daily        nystatin-triamcinolone (MYCOLOG II) 617341-5.1 UNIT/GM-% external cream Apply to affected area BID up to 7 days prn rash 30 g 3      Thiamine HCl 250 MG TABS         vitamin B complex with vitamin C (STRESS TAB) tablet Take 1 tablet by mouth daily        VITAMIN D, CHOLECALCIFEROL, PO Take 2,000 Units by mouth daily         vitamin E 400 units TABS Take 400 Units by mouth daily      .        Maternal Past Medical History:     Past Medical History:   Diagnosis Date     Anxiety      Asthma     reactive airway disease, exercised induced     SAEID      Post partum depression       labor     with current (second) pregnancy                       Family History:     Family History   Problem Relation Age of Onset     Sleep Apnea Mother      Diabetes Mother      Sleep Apnea Maternal Grandmother      Diabetes Maternal Grandmother      Sleep Apnea Maternal Grandfather      Hypertension Maternal Grandfather      Heart Failure Maternal Grandfather      Cerebrovascular Disease Maternal Grandfather      Family  history reviewed and updated in [facility]            Social History:     Social History     Tobacco Use     Smoking status: Never Smoker     Smokeless tobacco: Never Used   Substance Use Topics     Alcohol use: No            Review of Systems:   C: NEGATIVE for fever, chills, change in weight  E/M: NEGATIVE for ear, mouth and throat problems  R: NEGATIVE for significant cough or SOB  CV: NEGATIVE for chest pain, palpitations or peripheral edema          Physical Exam:   Vitals were reviewed    Constitutional: Awake, alert, cooperative, no apparent distress, and appears stated age.  Eyes: Lids and lashes normal, pupils equal, round and reactive to light, extra ocular muscles intact, sclera clear, conjunctiva normal.  ENT: Normocephalic, without obvious abnormality, atramatic, sinuses nontender on palpation, external ears without lesions, oral pharynx with moist mucus membranes, tonsils without erythema or exudates, gums normal and good dentition.  Neck: Supple, symmetrical, trachea midline, no adenopathy, thyroid symmetric, not enlarged and no tenderness, skin normal.  Hematologic / Lymphatic: No cervical lymphadenopathy and no supraclavicular lymphadenopathy.  Back: Symmetric, no curvature, spinous processes are non-tender on palpation, paraspinous muscles are non-tender on palpation, no costal vertebral tenderness.  Lungs: No increased work of breathing, good air exchange, clear to auscultation bilaterally, no crackles or wheezing.  Cardiovascular: Regular rate and rhythm, normal S1 and S2, no S3 or S4, and no murmur noted.  Chest / Breast: Breasts symmetrical, skin without lesion(s), no nipple retraction or dimpling, no nipple discharge, no masses palpated, no axillary or supraclavicular adenopathy.  Abdomen: No scars, normal bowel sounds, soft, non-distended, non-tender, no masses palpated, no hepatosplenomegally.  Genitourinary: No urethral discharge, normal external genitalia, no hernia.  Musculoskeletal: No  redness, warmth, or swelling of the joints.  Full range of motion noted.  Motor strength is 5 out of 5 all extremities bilaterally.  Tone is normal.  Neurologic: Awake, alert, oriented to name, place and time.  Cranial nerves II-XII are grossly intact.  Motor is 5 out of 5 bilaterally.  Cerebellar finger to nose, heel to shin intact.  Sensory is intact.  Babinski down going, Romberg negative, and gait is normal.  Neuropsychiatric: Normal affect, mood, orientation, memory and insight.  Skin: No rashes, erythema, pallor, petechia or purpura.     Cervix:   Membranes: AROM   Dilation: 6   Effacement: 60%   Station:-1    Presentation:Cephalic  Fetal Heart Rate Tracing: Tier 1 (normal)  Tocometer: external monitor                       Assessment:   Shobha Rader is a 38w0d pregnant female admitted with labor management.          Plan:   Admit - see IP orders  She wants a tubal ligation postpartum    Guerrero Benavides MD

## 2022-09-09 NOTE — PLAN OF CARE
VSS. Postpartum assessment WDL. Patient has intermittently trickled with fundal checks, but remains firm and midline. MD notified of this in L&D and no change was made to plan of care. Patient ambulating free of dizziness. Voiding without difficulty. Pain managed on tylenol and ibuprofen. Working on breastfeeding every 2-3 hours. Attentive to and bonding well with infant. Will continue to monitor.

## 2022-09-09 NOTE — PROVIDER NOTIFICATION
"   09/08/22 2131   Provider Notification   Provider Name/Title Dr. Benavides   Method of Notification Electronic Page   Request Evaluate - Remote   Notification Reason Other (Comment)  (PP bleeding)   Dr. Benavides paged, \"Patient is trickling with each fundal check and she has passed a couple quarter sized clots. Shes still firm. Do you want to give her any medications?\"    Dr. Benavides called back, no medications needed. Update MD if uterus becomes boggy.  "

## 2022-09-09 NOTE — L&D DELIVERY NOTE
Delivery Date: 2022    PREOPERATIVE DIAGNOSIS:  Advanced maternal age female at 38 weeks' gestation.    POSTOPERATIVE DIAGNOSIS:  Advanced maternal age female at 38 weeks' gestation, with delivery.    PROCEDURE:  Normal spontaneous vaginal delivery.    SURGEON:  Guerrero Benavides MD    ASSISTANT:  None.     ANESTHESIA:  Subcutaneous lidocaine.    ESTIMATED BLOOD LOSS:  100 mL    COMPLICATIONS:  None.    SPECIMENS:  Cord blood.    FINDINGS:  The patient delivered a viable female infant with a cord around the neck x 2.  She did this rather precipitously.  She delivered a normal placenta with 3-vessel cord spontaneously.  She incurred a second-degree perineal laceration in the midline where she had lacerated with the past childbirths and this was repaired with 3-0 Vicryl in the usual manner.  Needle, sponge, and instrument counts were correct.  The patient tolerated the procedure well.    DISPOSITION:  The patient is in satisfactory stable condition and is in recovery.      Guerrero Benavides MD        D: 2022   T: 2022   MT: [id number]    Name:     CANDI HERNANDEZ  MRN:      [id number]        Account:       [id number]   :      1985           Delivery Date: 2022     Document: [id number]

## 2022-09-10 VITALS
BODY MASS INDEX: 35.4 KG/M2 | DIASTOLIC BLOOD PRESSURE: 63 MMHG | WEIGHT: 233.6 LBS | RESPIRATION RATE: 18 BRPM | SYSTOLIC BLOOD PRESSURE: 110 MMHG | TEMPERATURE: 97.9 F | HEART RATE: 49 BPM | HEIGHT: 68 IN

## 2022-09-10 PROCEDURE — 250N000013 HC RX MED GY IP 250 OP 250 PS 637: Performed by: OBSTETRICS & GYNECOLOGY

## 2022-09-10 RX ORDER — IBUPROFEN 800 MG/1
800 TABLET, FILM COATED ORAL EVERY 6 HOURS PRN
COMMUNITY
Start: 2022-09-10

## 2022-09-10 RX ADMIN — IBUPROFEN 800 MG: 400 TABLET, FILM COATED ORAL at 02:58

## 2022-09-10 RX ADMIN — Medication 1 TABLET: at 09:58

## 2022-09-10 RX ADMIN — DOCUSATE SODIUM 100 MG: 100 CAPSULE, LIQUID FILLED ORAL at 09:59

## 2022-09-10 RX ADMIN — ACETAMINOPHEN 650 MG: 325 TABLET ORAL at 09:57

## 2022-09-10 RX ADMIN — ACETAMINOPHEN 650 MG: 325 TABLET ORAL at 02:58

## 2022-09-10 RX ADMIN — Medication 1000 MCG: at 09:58

## 2022-09-10 RX ADMIN — B-COMPLEX W/ C & FOLIC ACID TAB 1 TABLET: TAB at 09:59

## 2022-09-10 RX ADMIN — Medication 2000 UNITS: at 09:59

## 2022-09-10 ASSESSMENT — ACTIVITIES OF DAILY LIVING (ADL)
ADLS_ACUITY_SCORE: 22

## 2022-09-10 NOTE — PROGRESS NOTES
S: Patient doing well with no overnight issues and no current complaints.  Pain is well controlled.  Tolerating PO intake.  Breast feeding without issues.  Ambulating without difficulty.  Voiding and passing flatus.  Lochia is less than normal menses and decreasing.  Denies fevers, headaches, changes in vision, chest pain, SOB, nausea, vomiting, diarrhea, constipation, RUQ tenderness, dysuria, or LE pain.    O:   Vitals:    22 0335 22 0802 22 1524 09/10/22 0300   BP: 105/64 105/64 100/59 98/59   BP Location: Right arm      Pulse:  52  69   Resp: 16 18 18 16   Temp: 97.8  F (36.6  C) 97.7  F (36.5  C) 98.1  F (36.7  C)    TempSrc: Oral Oral Oral    Weight:       Height:         NAD, AAOx3, RRR, CTAB, ABd soft NTND, Firm fundus 1cm below the umbilicus, LE NT no edema    A: 36yo  s/PPD#2  who is recovering well.    P: Routine post partum care.  Plan for discharge today.  Pregnancy complicated by maternal obesity, AMA, malabsorption x/p bariatric procedure.   Desires sterilization - Due to [place] insurance, will need a 2 week post partum visit to sign required forms and will plan for Lap BS with Dr Benavides thereafter.

## 2022-09-10 NOTE — PLAN OF CARE
D: VSS, assessments WDL.   I: Pt. received complete discharge paperwork and discussed home medications .  Pt. was given times of last dose for all discharge medications in writing on discharge medication sheets.  Discharge teaching included home medication, pain management, activity restrictions, postpartum cares, and signs and symptoms of infection.    A: Discharge outcomes on care plan met.  Mother states understanding and comfort with self and baby cares.  P: Pt. discharged to home.  Pt. was discharged with baby, and bands were checked at time of discharge.  Pt. was accompanied by , nurse and baby, and left with personal belongings.   Pt. to follow up with OB per MD order.  Pt. had no further questions at the time of discharge and no unmet needs were identified.

## 2022-09-10 NOTE — DISCHARGE INSTRUCTIONS

## 2022-09-10 NOTE — DISCHARGE SUMMARY
[facility] Discharge Summary    Shobha Rader MRN# [id number]   Age: 37 year old YOB: 1985     Date of Admission:  9/8/2022  Date of Discharge::  9/10/2022   Admitting Physician:  Guerrero Benavides MD  Discharge Physician:  Iftikhar Boykin MD     Home clinic: [facility]          Admission Diagnoses:   Advanced maternal age female at 38 weeks' gestation.  Active labor          Discharge Diagnosis:   Same, delivered          Procedures:   Procedure(s): Vaginal delivery              Medications Prior to Admission:     Medications Prior to Admission   Medication Sig Dispense Refill Last Dose     venlafaxine (EFFEXOR XR) 37.5 MG 24 hr capsule Take 37.5 mg by mouth At Bedtime        Acetaminophen (TYLENOL PO) Take 500 mg by mouth every 6 hours as needed for mild pain or fever        albuterol (PROAIR HFA/PROVENTIL HFA/VENTOLIN HFA) 108 (90 BASE) MCG/ACT Inhaler Inhale 2 puffs into the lungs every 6 hours as needed         Ascorbic Acid (VITAMIN C PO)         BIOTIN PO Take 10,000 mcg by mouth daily        Calcium Citrate-Vitamin D (CALCIUM CITRATE + PO) Take 600 mg by mouth 2 times daily         cyanocobalamin 1000 MCG SUBL Place 1,000 mcg under the tongue        ferrous sulfate (IRON) 325 (65 FE) MG tablet Take by mouth daily (with breakfast)        fish oil-omega-3 fatty acids 1000 MG capsule Take 2 g by mouth daily        nystatin-triamcinolone (MYCOLOG II) 297288-4.1 UNIT/GM-% external cream Apply to affected area BID up to 7 days prn rash 30 g 3      Thiamine HCl 250 MG TABS         vitamin B complex with vitamin C (STRESS TAB) tablet Take 1 tablet by mouth daily        VITAMIN D, CHOLECALCIFEROL, PO Take 2,000 Units by mouth daily         vitamin E 400 units TABS Take 400 Units by mouth daily                Discharge Medications:     Current Discharge Medication List      START taking these medications    Details   ibuprofen (ADVIL/MOTRIN) 800 MG tablet Take 1 tablet (800 mg) by mouth every 6  hours as needed for other (cramping)    Associated Diagnoses: Indication for care in labor or delivery         CONTINUE these medications which have NOT CHANGED    Details   venlafaxine (EFFEXOR XR) 37.5 MG 24 hr capsule Take 37.5 mg by mouth At Bedtime      Acetaminophen (TYLENOL PO) Take 500 mg by mouth every 6 hours as needed for mild pain or fever      albuterol (PROAIR HFA/PROVENTIL HFA/VENTOLIN HFA) 108 (90 BASE) MCG/ACT Inhaler Inhale 2 puffs into the lungs every 6 hours as needed       Ascorbic Acid (VITAMIN C PO)       BIOTIN PO Take 10,000 mcg by mouth daily      Calcium Citrate-Vitamin D (CALCIUM CITRATE + PO) Take 600 mg by mouth 2 times daily       cyanocobalamin 1000 MCG SUBL Place 1,000 mcg under the tongue      ferrous sulfate (IRON) 325 (65 FE) MG tablet Take by mouth daily (with breakfast)      fish oil-omega-3 fatty acids 1000 MG capsule Take 2 g by mouth daily      nystatin-triamcinolone (MYCOLOG II) 741073-0.1 UNIT/GM-% external cream Apply to affected area BID up to 7 days prn rash  Qty: 30 g, Refills: 3    Comments: Can split into 2 medications if insurance does not cover combo.  Associated Diagnoses: Candidal intertrigo; Abdominal pannus      Thiamine HCl 250 MG TABS       vitamin B complex with vitamin C (STRESS TAB) tablet Take 1 tablet by mouth daily      VITAMIN D, CHOLECALCIFEROL, PO Take 2,000 Units by mouth daily       vitamin E 400 units TABS Take 400 Units by mouth daily                   Consultations:   No consultations were requested during this admission          Brief History of Labor:   Please see operative report for further details.           Hospital Course:   The patient's hospital course was unremarkable.  She recovered as anticipated and experienced no post-operative complications.  Upon discharge, her pain was well controlled. Vaginal bleeding is mild to moderate.  Voiding without difficulty.  Ambulating well and tolerating a normal diet.  No fever or significant wound  drainage.  Breastfeeding well.  Infant is stable.  She had a bowel movement prior to discharge.  She was discharged on post-partum day #2.    Post-partum hemoglobin:   Hemoglobin   Date Value Ref Range Status   09/08/2022 11.6 (L) 11.7 - 15.7 g/dL Final   10/28/2019 13.5 11.7 - 15.7 g/dL Final             Discharge Instructions and Follow-Up:   Discharge diet: Regular   Discharge activity: Activity as tolerated, no lifting greater than 10 lbs   Discharge follow-up: Follow up with consultant in 6 weeks for post partum visit   Wound care: Keep wound clean and dry           Discharge Disposition:   Discharged to home      Attestation:  I have reviewed today's vital signs, notes, medications, labs and imaging.  Amount of time performed on this discharge summary: 10 minutes.    Iftikhar Boykin MD

## 2022-09-10 NOTE — PLAN OF CARE
Vital signs are stable. Postpartum assessment WDL. Pain controlled with ordered pain medications.  Patient voiding without difficulty. Breastfeeding on cue independently.  Patient and infant bonding well. Will continue with current plan of care.

## 2022-09-10 NOTE — LACTATION NOTE
Routine Lactation visit with Shobha, significant other RD & baby luz marina [place]. Getting ready for discharge. Shobha reports feeding is going well. She shared her milk is starting to come in! Baby has been cluster feeding but Shobha shared she finally seems content after a feeding this morning. Shobha shared she has a history of gastric bypass, but  her last child successfully after her gastric bypass. Reviewed how to tell if baby is getting enough and encouraged tracking voids/stools and having follow up with peds to monitor weight gain and growth, per normal  follow up appointments.  Discussed cluster feeding, what it is and when to expect it, The Second Night, satiety cues, feeding cues, and reviewed Feeding Log for home use. Encouraged to review Breastfeeding section in Your Guide to Postpartum & [place] [facility].    Reviewed milk supply and engorgement. Reviewed typical timeline of milk supply initiation and progression over first 3-5 days postpartum. Discussed comfort measures for engorgement, plugged duct treatment, and warning signs of breast infection. General questions answered regarding pumping, when it's helpful and necessary. Reviewed general recommendation to wait to start pumping until breastfeeding is well established unless there are feeding difficulties or engorgement not relieved by feeding baby or hand expression. Discussed introducing a bottle and recommendation to wait for bottle introduction for 3-4 weeks unless baby needs to supplement for medical reasons.    Feeding plan: Recommend unlimited, frequent breast feedings: At least 8 - 12 times every 24 hours. Avoid pacifiers and supplementation with formula unless medically indicated. Encouraged use of feeding log and to record feedings, and void/stool patterns. Shobha has a breast pump for home use. Follow up with [facility]. Reviewed outpatient lactation resources. Shobha & RD very appreciative of visit.    Martha Castaneda, RN-C, IBCLC,  MNN, PHN, BSN

## 2022-10-28 PROCEDURE — 88302 TISSUE EXAM BY PATHOLOGIST: CPT | Mod: TC,ORL | Performed by: OBSTETRICS & GYNECOLOGY

## 2022-10-28 PROCEDURE — 88302 TISSUE EXAM BY PATHOLOGIST: CPT | Mod: 26 | Performed by: PATHOLOGY

## 2022-10-31 ENCOUNTER — LAB REQUISITION (OUTPATIENT)
Dept: LAB | Facility: CLINIC | Age: 37
End: 2022-10-31
Payer: COMMERCIAL

## 2022-11-01 LAB
PATH REPORT.COMMENTS IMP SPEC: NORMAL
PATH REPORT.COMMENTS IMP SPEC: NORMAL
PATH REPORT.FINAL DX SPEC: NORMAL
PATH REPORT.GROSS SPEC: NORMAL
PATH REPORT.MICROSCOPIC SPEC OTHER STN: NORMAL
PATH REPORT.RELEVANT HX SPEC: NORMAL
PHOTO IMAGE: NORMAL

## 2023-04-11 ENCOUNTER — LAB REQUISITION (OUTPATIENT)
Dept: LAB | Facility: CLINIC | Age: 38
End: 2023-04-11
Payer: COMMERCIAL

## 2023-04-11 DIAGNOSIS — Z13.228 ENCOUNTER FOR SCREENING FOR OTHER METABOLIC DISORDERS: ICD-10-CM

## 2023-04-11 DIAGNOSIS — Z13.9 ENCOUNTER FOR SCREENING, UNSPECIFIED: ICD-10-CM

## 2023-04-11 DIAGNOSIS — Z01.419 ENCOUNTER FOR GYNECOLOGICAL EXAMINATION (GENERAL) (ROUTINE) WITHOUT ABNORMAL FINDINGS: ICD-10-CM

## 2023-04-11 LAB
ALBUMIN SERPL BCG-MCNC: 4.5 G/DL (ref 3.5–5.2)
ALP SERPL-CCNC: 91 U/L (ref 35–104)
ALT SERPL W P-5'-P-CCNC: 45 U/L (ref 10–35)
ANION GAP SERPL CALCULATED.3IONS-SCNC: 15 MMOL/L (ref 7–15)
AST SERPL W P-5'-P-CCNC: 29 U/L (ref 10–35)
BASOPHILS # BLD AUTO: 0 10E3/UL (ref 0–0.2)
BASOPHILS NFR BLD AUTO: 1 %
BILIRUB SERPL-MCNC: 0.2 MG/DL
BUN SERPL-MCNC: 21.7 MG/DL (ref 6–20)
CALCIUM SERPL-MCNC: 9.4 MG/DL (ref 8.6–10)
CHLORIDE SERPL-SCNC: 105 MMOL/L (ref 98–107)
CREAT SERPL-MCNC: 0.73 MG/DL (ref 0.51–0.95)
DEPRECATED HCO3 PLAS-SCNC: 21 MMOL/L (ref 22–29)
EOSINOPHIL # BLD AUTO: 0.1 10E3/UL (ref 0–0.7)
EOSINOPHIL NFR BLD AUTO: 1 %
ERYTHROCYTE [DISTWIDTH] IN BLOOD BY AUTOMATED COUNT: 13.1 % (ref 10–15)
GFR SERPL CREATININE-BSD FRML MDRD: >90 ML/MIN/1.73M2
GLUCOSE SERPL-MCNC: 86 MG/DL (ref 70–99)
HBA1C MFR BLD: 5.4 %
HCT VFR BLD AUTO: 44.7 % (ref 35–47)
HGB BLD-MCNC: 14.3 G/DL (ref 11.7–15.7)
IMM GRANULOCYTES # BLD: 0 10E3/UL
IMM GRANULOCYTES NFR BLD: 0 %
LYMPHOCYTES # BLD AUTO: 1.8 10E3/UL (ref 0.8–5.3)
LYMPHOCYTES NFR BLD AUTO: 30 %
MCH RBC QN AUTO: 30.2 PG (ref 26.5–33)
MCHC RBC AUTO-ENTMCNC: 32 G/DL (ref 31.5–36.5)
MCV RBC AUTO: 94 FL (ref 78–100)
MONOCYTES # BLD AUTO: 0.5 10E3/UL (ref 0–1.3)
MONOCYTES NFR BLD AUTO: 8 %
NEUTROPHILS # BLD AUTO: 3.7 10E3/UL (ref 1.6–8.3)
NEUTROPHILS NFR BLD AUTO: 60 %
NRBC # BLD AUTO: 0 10E3/UL
NRBC BLD AUTO-RTO: 0 /100
PLATELET # BLD AUTO: 273 10E3/UL (ref 150–450)
POTASSIUM SERPL-SCNC: 4.5 MMOL/L (ref 3.4–5.3)
PROT SERPL-MCNC: 7.7 G/DL (ref 6.4–8.3)
RBC # BLD AUTO: 4.74 10E6/UL (ref 3.8–5.2)
SODIUM SERPL-SCNC: 141 MMOL/L (ref 136–145)
TSH SERPL DL<=0.005 MIU/L-ACNC: 1.57 UIU/ML (ref 0.3–4.2)
WBC # BLD AUTO: 6.1 10E3/UL (ref 4–11)

## 2023-04-11 PROCEDURE — 83036 HEMOGLOBIN GLYCOSYLATED A1C: CPT | Performed by: OBSTETRICS & GYNECOLOGY

## 2023-04-11 PROCEDURE — 85004 AUTOMATED DIFF WBC COUNT: CPT | Performed by: OBSTETRICS & GYNECOLOGY

## 2023-04-11 PROCEDURE — G0145 SCR C/V CYTO,THINLAYER,RESCR: HCPCS | Performed by: OBSTETRICS & GYNECOLOGY

## 2023-04-11 PROCEDURE — 87624 HPV HI-RISK TYP POOLED RSLT: CPT | Performed by: OBSTETRICS & GYNECOLOGY

## 2023-04-11 PROCEDURE — 84443 ASSAY THYROID STIM HORMONE: CPT | Performed by: OBSTETRICS & GYNECOLOGY

## 2023-04-11 PROCEDURE — 80053 COMPREHEN METABOLIC PANEL: CPT | Performed by: OBSTETRICS & GYNECOLOGY

## 2023-04-14 LAB
BKR LAB AP GYN ADEQUACY: NORMAL
BKR LAB AP GYN INTERPRETATION: NORMAL
BKR LAB AP HPV REFLEX: NORMAL
BKR LAB AP LMP: NORMAL
BKR LAB AP PREVIOUS ABNL DX: NORMAL
BKR LAB AP PREVIOUS ABNORMAL: NORMAL
PATH REPORT.COMMENTS IMP SPEC: NORMAL
PATH REPORT.COMMENTS IMP SPEC: NORMAL
PATH REPORT.RELEVANT HX SPEC: NORMAL

## 2023-04-19 LAB
HUMAN PAPILLOMA VIRUS 16 DNA: NEGATIVE
HUMAN PAPILLOMA VIRUS 18 DNA: NEGATIVE
HUMAN PAPILLOMA VIRUS FINAL DIAGNOSIS: ABNORMAL
HUMAN PAPILLOMA VIRUS OTHER HR: POSITIVE

## 2023-04-29 ENCOUNTER — HEALTH MAINTENANCE LETTER (OUTPATIENT)
Age: 38
End: 2023-04-29

## 2024-07-06 ENCOUNTER — HEALTH MAINTENANCE LETTER (OUTPATIENT)
Age: 39
End: 2024-07-06

## 2025-07-13 ENCOUNTER — HEALTH MAINTENANCE LETTER (OUTPATIENT)
Age: 40
End: 2025-07-13

## (undated) DEVICE — ENDO TROCAR OPTICAL 12MM VERSAONE W/STD FIX CAN UNVCA12STF

## (undated) DEVICE — BNDG ABDOMINAL BINDER 9X62-84" 79-89210

## (undated) DEVICE — ESU GROUND PAD UNIVERSAL W/O CORD

## (undated) DEVICE — SOL NACL 0.9% INJ 1000ML BAG 2B1324X

## (undated) DEVICE — PAD CHUX UNDERPAD 23X24" 7136

## (undated) DEVICE — INTRODUCER EEA STPL TRANS ANAL/ABD 25MM EEATAID25

## (undated) DEVICE — SUCTION IRR STRYKERFLOW II W/TIP 250-070-520

## (undated) DEVICE — SUCTION CANISTER MEDIVAC LINER 3000ML W/LID 65651-530

## (undated) DEVICE — SU VICRYL 4-0 PS-2 18" UND J496H

## (undated) DEVICE — STPL CIRCULAR XL 25MM W/TILT TIP EEAXL25

## (undated) DEVICE — GOWN IMPERVIOUS SPECIALTY XLG/XLONG 32474

## (undated) DEVICE — PACK LAP CHOLE SLC15LCFSD

## (undated) DEVICE — PREP CHLORAPREP 26ML TINTED ORANGE  260815

## (undated) DEVICE — STPL POWERED ECHELON 60MM PSEE60A

## (undated) DEVICE — ENDO TROCAR OPTICAL 12MM VERSAPORT PLUS W/FIX CAN ONB12STF

## (undated) DEVICE — ENDO TROCAR FIRST ENTRY KII FIOS Z-THRD 12X100MM CTF73

## (undated) DEVICE — SU VICRYL 0 TIE 12X18" J906G

## (undated) DEVICE — GLOVE PROTEXIS W/NEU-THERA 8.0  2D73TE80

## (undated) DEVICE — ESU CORD MONOPOLAR 10'  E0510

## (undated) DEVICE — DRSG GAUZE 4X4" 3033

## (undated) DEVICE — DRSG BANDAID 1X3" FABRIC CURITY LATEX FREE KC44101

## (undated) DEVICE — PATCH PERI STRP VERIT PSD 6006-ECH-V

## (undated) DEVICE — DRSG STERI STRIP 1/2X4" R1547

## (undated) DEVICE — SYR 50ML CATH TIP W/O NDL 309620

## (undated) DEVICE — LINEN TOWEL PACK X5 5464

## (undated) DEVICE — TUBE GASTRIC EVACUATOR STOMACH 32FR LATEX

## (undated) DEVICE — DRSG BANDAID 3/4X3"

## (undated) DEVICE — STPL RELOAD REG TISSUE ECHELON 60 X 3.6MM BLUE GST60B

## (undated) DEVICE — SOL NACL 0.9% IRRIG 1000ML BOTTLE 07138-09

## (undated) DEVICE — SU SILK 2-0 SH 30" K833H

## (undated) DEVICE — ESU HARMONIC ACE LAP SHEARS ETHICON ACE+ 7 5MMX36CM HARH36

## (undated) DEVICE — SU VICRYL 2-0 SH 27" J317H

## (undated) DEVICE — ESU HOLDER LAP INST DISP PURPLE LONG 330MM H-PRO-330

## (undated) DEVICE — DRAPE BREAST/CHEST 29420

## (undated) DEVICE — LUBRICANT INST KIT ENDO-LUBE 220-90

## (undated) RX ORDER — BUPIVACAINE HYDROCHLORIDE AND EPINEPHRINE 2.5; 5 MG/ML; UG/ML
INJECTION, SOLUTION EPIDURAL; INFILTRATION; INTRACAUDAL; PERINEURAL
Status: DISPENSED
Start: 2017-07-19

## (undated) RX ORDER — GLYCOPYRROLATE 0.2 MG/ML
INJECTION, SOLUTION INTRAMUSCULAR; INTRAVENOUS
Status: DISPENSED
Start: 2017-07-19

## (undated) RX ORDER — SCOLOPAMINE TRANSDERMAL SYSTEM 1 MG/1
PATCH, EXTENDED RELEASE TRANSDERMAL
Status: DISPENSED
Start: 2017-07-19

## (undated) RX ORDER — HEPARIN SODIUM 5000 [USP'U]/.5ML
INJECTION, SOLUTION INTRAVENOUS; SUBCUTANEOUS
Status: DISPENSED
Start: 2017-07-19

## (undated) RX ORDER — HYDROMORPHONE HYDROCHLORIDE 1 MG/ML
INJECTION, SOLUTION INTRAMUSCULAR; INTRAVENOUS; SUBCUTANEOUS
Status: DISPENSED
Start: 2017-07-19

## (undated) RX ORDER — CEFAZOLIN SODIUM 1 G/50ML
SOLUTION INTRAVENOUS
Status: DISPENSED
Start: 2017-07-19

## (undated) RX ORDER — ONDANSETRON 2 MG/ML
INJECTION INTRAMUSCULAR; INTRAVENOUS
Status: DISPENSED
Start: 2017-07-19

## (undated) RX ORDER — CEFAZOLIN SODIUM 2 G/100ML
INJECTION, SOLUTION INTRAVENOUS
Status: DISPENSED
Start: 2017-07-19

## (undated) RX ORDER — VECURONIUM BROMIDE 1 MG/ML
INJECTION, POWDER, LYOPHILIZED, FOR SOLUTION INTRAVENOUS
Status: DISPENSED
Start: 2017-07-19

## (undated) RX ORDER — PROPOFOL 10 MG/ML
INJECTION, EMULSION INTRAVENOUS
Status: DISPENSED
Start: 2017-07-19

## (undated) RX ORDER — FENTANYL CITRATE 50 UG/ML
INJECTION, SOLUTION INTRAMUSCULAR; INTRAVENOUS
Status: DISPENSED
Start: 2017-07-19

## (undated) RX ORDER — LIDOCAINE HYDROCHLORIDE 20 MG/ML
INJECTION, SOLUTION EPIDURAL; INFILTRATION; INTRACAUDAL; PERINEURAL
Status: DISPENSED
Start: 2017-07-19

## (undated) RX ORDER — DEXAMETHASONE SODIUM PHOSPHATE 4 MG/ML
INJECTION, SOLUTION INTRA-ARTICULAR; INTRALESIONAL; INTRAMUSCULAR; INTRAVENOUS; SOFT TISSUE
Status: DISPENSED
Start: 2017-07-19